# Patient Record
Sex: MALE | Race: WHITE | NOT HISPANIC OR LATINO | URBAN - METROPOLITAN AREA
[De-identification: names, ages, dates, MRNs, and addresses within clinical notes are randomized per-mention and may not be internally consistent; named-entity substitution may affect disease eponyms.]

---

## 2018-03-28 ENCOUNTER — INPATIENT (INPATIENT)
Facility: HOSPITAL | Age: 73
LOS: 5 days | Discharge: HOME | DRG: 40 | End: 2018-04-03
Attending: PSYCHIATRY & NEUROLOGY | Admitting: PSYCHIATRY & NEUROLOGY
Payer: MEDICARE

## 2018-03-28 VITALS
SYSTOLIC BLOOD PRESSURE: 212 MMHG | TEMPERATURE: 98 F | OXYGEN SATURATION: 100 % | RESPIRATION RATE: 18 BRPM | HEART RATE: 83 BPM | DIASTOLIC BLOOD PRESSURE: 96 MMHG

## 2018-03-28 DIAGNOSIS — R47.01 APHASIA: ICD-10-CM

## 2018-03-28 LAB
ALBUMIN SERPL ELPH-MCNC: 3.9 G/DL — SIGNIFICANT CHANGE UP (ref 3.3–5)
ALBUMIN SERPL ELPH-MCNC: 4 G/DL — SIGNIFICANT CHANGE UP (ref 3.3–5)
ALP SERPL-CCNC: 80 U/L — SIGNIFICANT CHANGE UP (ref 40–120)
ALP SERPL-CCNC: 86 U/L — SIGNIFICANT CHANGE UP (ref 40–120)
ALT FLD-CCNC: 19 U/L RC — SIGNIFICANT CHANGE UP (ref 10–45)
ALT FLD-CCNC: 20 U/L RC — SIGNIFICANT CHANGE UP (ref 10–45)
ANION GAP SERPL CALC-SCNC: 10 MMOL/L — SIGNIFICANT CHANGE UP (ref 5–17)
ANION GAP SERPL CALC-SCNC: 9 MMOL/L — SIGNIFICANT CHANGE UP (ref 5–17)
APTT BLD: 33.4 SEC — SIGNIFICANT CHANGE UP (ref 27.5–37.4)
APTT BLD: 54.9 SEC — HIGH (ref 27.5–37.4)
AST SERPL-CCNC: 21 U/L — SIGNIFICANT CHANGE UP (ref 10–40)
AST SERPL-CCNC: 24 U/L — SIGNIFICANT CHANGE UP (ref 10–40)
BASE EXCESS BLDV CALC-SCNC: 0.7 MMOL/L — SIGNIFICANT CHANGE UP (ref -2–2)
BASOPHILS # BLD AUTO: 0 K/UL — SIGNIFICANT CHANGE UP (ref 0–0.2)
BASOPHILS # BLD AUTO: 0.1 K/UL — SIGNIFICANT CHANGE UP (ref 0–0.2)
BASOPHILS NFR BLD AUTO: 0.3 % — SIGNIFICANT CHANGE UP (ref 0–2)
BASOPHILS NFR BLD AUTO: 1.2 % — SIGNIFICANT CHANGE UP (ref 0–2)
BILIRUB SERPL-MCNC: 0.4 MG/DL — SIGNIFICANT CHANGE UP (ref 0.2–1.2)
BILIRUB SERPL-MCNC: 0.5 MG/DL — SIGNIFICANT CHANGE UP (ref 0.2–1.2)
BLD GP AB SCN SERPL QL: NEGATIVE — SIGNIFICANT CHANGE UP
BUN SERPL-MCNC: 18 MG/DL — SIGNIFICANT CHANGE UP (ref 7–23)
BUN SERPL-MCNC: 21 MG/DL — SIGNIFICANT CHANGE UP (ref 7–23)
CA-I SERPL-SCNC: 1.35 MMOL/L — HIGH (ref 1.12–1.3)
CALCIUM SERPL-MCNC: 10.4 MG/DL — SIGNIFICANT CHANGE UP (ref 8.4–10.5)
CALCIUM SERPL-MCNC: 10.8 MG/DL — HIGH (ref 8.4–10.5)
CHLORIDE BLDV-SCNC: 110 MMOL/L — HIGH (ref 96–108)
CHLORIDE SERPL-SCNC: 106 MMOL/L — SIGNIFICANT CHANGE UP (ref 96–108)
CHLORIDE SERPL-SCNC: 107 MMOL/L — SIGNIFICANT CHANGE UP (ref 96–108)
CK MB BLD-MCNC: 3.2 % — SIGNIFICANT CHANGE UP (ref 0–3.5)
CK MB BLD-MCNC: 3.6 % — HIGH (ref 0–3.5)
CK MB CFR SERPL CALC: 4.7 NG/ML — SIGNIFICANT CHANGE UP (ref 0–6.7)
CK MB CFR SERPL CALC: 5.3 NG/ML — SIGNIFICANT CHANGE UP (ref 0–6.7)
CK SERPL-CCNC: 132 U/L — SIGNIFICANT CHANGE UP (ref 30–200)
CK SERPL-CCNC: 164 U/L — SIGNIFICANT CHANGE UP (ref 30–200)
CO2 BLDV-SCNC: 27 MMOL/L — SIGNIFICANT CHANGE UP (ref 22–30)
CO2 SERPL-SCNC: 22 MMOL/L — SIGNIFICANT CHANGE UP (ref 22–31)
CO2 SERPL-SCNC: 24 MMOL/L — SIGNIFICANT CHANGE UP (ref 22–31)
CREAT SERPL-MCNC: 0.7 MG/DL — SIGNIFICANT CHANGE UP (ref 0.5–1.3)
CREAT SERPL-MCNC: 0.82 MG/DL — SIGNIFICANT CHANGE UP (ref 0.5–1.3)
EOSINOPHIL # BLD AUTO: 0.1 K/UL — SIGNIFICANT CHANGE UP (ref 0–0.5)
EOSINOPHIL # BLD AUTO: 0.1 K/UL — SIGNIFICANT CHANGE UP (ref 0–0.5)
EOSINOPHIL NFR BLD AUTO: 0.9 % — SIGNIFICANT CHANGE UP (ref 0–6)
EOSINOPHIL NFR BLD AUTO: 1.4 % — SIGNIFICANT CHANGE UP (ref 0–6)
ESTIMATED AVERAGE GLUCOSE: 100 MG/DL — SIGNIFICANT CHANGE UP (ref 68–114)
GAS PNL BLDV: 137 MMOL/L — SIGNIFICANT CHANGE UP (ref 136–145)
GAS PNL BLDV: SIGNIFICANT CHANGE UP
GLUCOSE BLDC GLUCOMTR-MCNC: 104 MG/DL — HIGH (ref 70–99)
GLUCOSE BLDV-MCNC: 113 MG/DL — HIGH (ref 70–99)
GLUCOSE SERPL-MCNC: 116 MG/DL — HIGH (ref 70–99)
GLUCOSE SERPL-MCNC: 120 MG/DL — HIGH (ref 70–99)
HBA1C BLD-MCNC: 5.1 % — SIGNIFICANT CHANGE UP (ref 4–5.6)
HBA1C BLD-MCNC: 5.1 % — SIGNIFICANT CHANGE UP (ref 4–5.6)
HCO3 BLDV-SCNC: 25 MMOL/L — SIGNIFICANT CHANGE UP (ref 21–29)
HCT VFR BLD CALC: 47.5 % — SIGNIFICANT CHANGE UP (ref 39–50)
HCT VFR BLD CALC: 48.8 % — SIGNIFICANT CHANGE UP (ref 39–50)
HCT VFR BLDA CALC: 50 % — SIGNIFICANT CHANGE UP (ref 39–50)
HGB BLD CALC-MCNC: 16.2 G/DL — SIGNIFICANT CHANGE UP (ref 13–17)
HGB BLD-MCNC: 16.2 G/DL — SIGNIFICANT CHANGE UP (ref 13–17)
HGB BLD-MCNC: 16.6 G/DL — SIGNIFICANT CHANGE UP (ref 13–17)
INR BLD: 1 RATIO — SIGNIFICANT CHANGE UP (ref 0.88–1.16)
INR BLD: 1.06 RATIO — SIGNIFICANT CHANGE UP (ref 0.88–1.16)
LACTATE BLDV-MCNC: 1.6 MMOL/L — SIGNIFICANT CHANGE UP (ref 0.7–2)
LYMPHOCYTES # BLD AUTO: 1.6 K/UL — SIGNIFICANT CHANGE UP (ref 1–3.3)
LYMPHOCYTES # BLD AUTO: 18.6 % — SIGNIFICANT CHANGE UP (ref 13–44)
LYMPHOCYTES # BLD AUTO: 2.5 K/UL — SIGNIFICANT CHANGE UP (ref 1–3.3)
LYMPHOCYTES # BLD AUTO: 32.1 % — SIGNIFICANT CHANGE UP (ref 13–44)
MCHC RBC-ENTMCNC: 30.8 PG — SIGNIFICANT CHANGE UP (ref 27–34)
MCHC RBC-ENTMCNC: 31 PG — SIGNIFICANT CHANGE UP (ref 27–34)
MCHC RBC-ENTMCNC: 34 GM/DL — SIGNIFICANT CHANGE UP (ref 32–36)
MCHC RBC-ENTMCNC: 34 GM/DL — SIGNIFICANT CHANGE UP (ref 32–36)
MCV RBC AUTO: 90.7 FL — SIGNIFICANT CHANGE UP (ref 80–100)
MCV RBC AUTO: 91.2 FL — SIGNIFICANT CHANGE UP (ref 80–100)
MONOCYTES # BLD AUTO: 0.5 K/UL — SIGNIFICANT CHANGE UP (ref 0–0.9)
MONOCYTES # BLD AUTO: 0.8 K/UL — SIGNIFICANT CHANGE UP (ref 0–0.9)
MONOCYTES NFR BLD AUTO: 10 % — SIGNIFICANT CHANGE UP (ref 2–14)
MONOCYTES NFR BLD AUTO: 5.6 % — SIGNIFICANT CHANGE UP (ref 2–14)
NEUTROPHILS # BLD AUTO: 4.3 K/UL — SIGNIFICANT CHANGE UP (ref 1.8–7.4)
NEUTROPHILS # BLD AUTO: 6.4 K/UL — SIGNIFICANT CHANGE UP (ref 1.8–7.4)
NEUTROPHILS NFR BLD AUTO: 55.3 % — SIGNIFICANT CHANGE UP (ref 43–77)
NEUTROPHILS NFR BLD AUTO: 74.6 % — SIGNIFICANT CHANGE UP (ref 43–77)
PCO2 BLDV: 43 MMHG — SIGNIFICANT CHANGE UP (ref 35–50)
PH BLDV: 7.39 — SIGNIFICANT CHANGE UP (ref 7.35–7.45)
PLATELET # BLD AUTO: 237 K/UL — SIGNIFICANT CHANGE UP (ref 150–400)
PLATELET # BLD AUTO: 252 K/UL — SIGNIFICANT CHANGE UP (ref 150–400)
PO2 BLDV: 30 MMHG — SIGNIFICANT CHANGE UP (ref 25–45)
POTASSIUM BLDV-SCNC: 3.7 MMOL/L — SIGNIFICANT CHANGE UP (ref 3.5–5)
POTASSIUM SERPL-MCNC: 4 MMOL/L — SIGNIFICANT CHANGE UP (ref 3.5–5.3)
POTASSIUM SERPL-MCNC: 4.2 MMOL/L — SIGNIFICANT CHANGE UP (ref 3.5–5.3)
POTASSIUM SERPL-SCNC: 4 MMOL/L — SIGNIFICANT CHANGE UP (ref 3.5–5.3)
POTASSIUM SERPL-SCNC: 4.2 MMOL/L — SIGNIFICANT CHANGE UP (ref 3.5–5.3)
PROT SERPL-MCNC: 6.8 G/DL — SIGNIFICANT CHANGE UP (ref 6–8.3)
PROT SERPL-MCNC: 7.3 G/DL — SIGNIFICANT CHANGE UP (ref 6–8.3)
PROTHROM AB SERPL-ACNC: 10.8 SEC — SIGNIFICANT CHANGE UP (ref 9.8–12.7)
PROTHROM AB SERPL-ACNC: 11.6 SEC — SIGNIFICANT CHANGE UP (ref 9.8–12.7)
RBC # BLD: 5.24 M/UL — SIGNIFICANT CHANGE UP (ref 4.2–5.8)
RBC # BLD: 5.36 M/UL — SIGNIFICANT CHANGE UP (ref 4.2–5.8)
RBC # FLD: 12.7 % — SIGNIFICANT CHANGE UP (ref 10.3–14.5)
RBC # FLD: 12.9 % — SIGNIFICANT CHANGE UP (ref 10.3–14.5)
RH IG SCN BLD-IMP: POSITIVE — SIGNIFICANT CHANGE UP
RH IG SCN BLD-IMP: POSITIVE — SIGNIFICANT CHANGE UP
SAO2 % BLDV: 55 % — LOW (ref 67–88)
SODIUM SERPL-SCNC: 139 MMOL/L — SIGNIFICANT CHANGE UP (ref 135–145)
SODIUM SERPL-SCNC: 139 MMOL/L — SIGNIFICANT CHANGE UP (ref 135–145)
TROPONIN T SERPL-MCNC: <0.01 NG/ML — SIGNIFICANT CHANGE UP (ref 0–0.06)
TROPONIN T SERPL-MCNC: <0.01 NG/ML — SIGNIFICANT CHANGE UP (ref 0–0.06)
WBC # BLD: 7.7 K/UL — SIGNIFICANT CHANGE UP (ref 3.8–10.5)
WBC # BLD: 8.6 K/UL — SIGNIFICANT CHANGE UP (ref 3.8–10.5)
WBC # FLD AUTO: 7.7 K/UL — SIGNIFICANT CHANGE UP (ref 3.8–10.5)
WBC # FLD AUTO: 8.6 K/UL — SIGNIFICANT CHANGE UP (ref 3.8–10.5)

## 2018-03-28 PROCEDURE — 70496 CT ANGIOGRAPHY HEAD: CPT | Mod: 26

## 2018-03-28 PROCEDURE — 99223 1ST HOSP IP/OBS HIGH 75: CPT

## 2018-03-28 PROCEDURE — 99291 CRITICAL CARE FIRST HOUR: CPT

## 2018-03-28 PROCEDURE — 70551 MRI BRAIN STEM W/O DYE: CPT | Mod: 26

## 2018-03-28 PROCEDURE — 70498 CT ANGIOGRAPHY NECK: CPT | Mod: 26

## 2018-03-28 PROCEDURE — 71045 X-RAY EXAM CHEST 1 VIEW: CPT | Mod: 26

## 2018-03-28 PROCEDURE — 70450 CT HEAD/BRAIN W/O DYE: CPT | Mod: 26,59

## 2018-03-28 PROCEDURE — 93010 ELECTROCARDIOGRAM REPORT: CPT

## 2018-03-28 RX ORDER — ASPIRIN/CALCIUM CARB/MAGNESIUM 324 MG
81 TABLET ORAL DAILY
Qty: 0 | Refills: 0 | Status: DISCONTINUED | OUTPATIENT
Start: 2018-03-28 | End: 2018-04-03

## 2018-03-28 RX ORDER — ATORVASTATIN CALCIUM 80 MG/1
80 TABLET, FILM COATED ORAL AT BEDTIME
Qty: 0 | Refills: 0 | Status: DISCONTINUED | OUTPATIENT
Start: 2018-03-28 | End: 2018-04-03

## 2018-03-28 RX ORDER — ENOXAPARIN SODIUM 100 MG/ML
40 INJECTION SUBCUTANEOUS EVERY 24 HOURS
Qty: 0 | Refills: 0 | Status: DISCONTINUED | OUTPATIENT
Start: 2018-03-28 | End: 2018-04-03

## 2018-03-28 RX ORDER — CLOPIDOGREL BISULFATE 75 MG/1
75 TABLET, FILM COATED ORAL DAILY
Qty: 0 | Refills: 0 | Status: DISCONTINUED | OUTPATIENT
Start: 2018-03-28 | End: 2018-04-03

## 2018-03-28 RX ORDER — INFLUENZA VIRUS VACCINE 15; 15; 15; 15 UG/.5ML; UG/.5ML; UG/.5ML; UG/.5ML
0.5 SUSPENSION INTRAMUSCULAR ONCE
Qty: 0 | Refills: 0 | Status: DISCONTINUED | OUTPATIENT
Start: 2018-03-28 | End: 2018-04-03

## 2018-03-28 RX ADMIN — CLOPIDOGREL BISULFATE 75 MILLIGRAM(S): 75 TABLET, FILM COATED ORAL at 16:49

## 2018-03-28 RX ADMIN — ENOXAPARIN SODIUM 40 MILLIGRAM(S): 100 INJECTION SUBCUTANEOUS at 16:49

## 2018-03-28 RX ADMIN — ATORVASTATIN CALCIUM 80 MILLIGRAM(S): 80 TABLET, FILM COATED ORAL at 22:34

## 2018-03-28 RX ADMIN — Medication 81 MILLIGRAM(S): at 16:49

## 2018-03-28 NOTE — ED PROVIDER NOTE - OBJECTIVE STATEMENT
68yo M with h/o anxiety, traumatic brain injury, presenting to ED with AMS, difficulty speaking; last known normal was prior to sleep (b/t 12am-1am).  Per EMS, notification for possible stroke.

## 2018-03-28 NOTE — ED PROVIDER NOTE - PROGRESS NOTE DETAILS
Per d/w neurology: obtained CT head, perfusion and CTA.  Per time frame as best elicited in history (wife now at bedside), patient is not a candidate for TPA.  Possible endovascular intervention, but to be further evaluated by neurology service.  Neuro service at bedside, recommends treating as acute CVA for now.  /90; per stroke guidelines for patient's not getting TPA and per neurology service recommendations, will hold antihypertensive medication at present unless SBP> 220.  To be admitted to neuro service for further work-up and management. Lux

## 2018-03-28 NOTE — ED PROVIDER NOTE - UNABLE TO OBTAIN
Code stroke activation 027pa Urgent need for Intervention Severe Illness/Injury stroke code; ams; ros largely per EMS

## 2018-03-28 NOTE — H&P ADULT - ASSESSMENT
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 As per patient, he went to bed at 1 am and woke up with symptoms of weakness in right hand, right hand was moving on its own, word finding difficulty and difficulty understanding. Wife called EMS. His NIHSS was 2 (could not answer month, not able to repeat sentence clearly). MRS was 0. He denied numbness, dizziness, change in vision. He had problem with repetition and following commands.     Impression     Acute stroke symptoms from unclear etiology     Plan     Permissive HTN x 24hrs goal /120  MRI brain w/o cont  HgA1c  Lipid profile  ASA 81mg  Atorvastatin 80 mg   TTE  Telemetry monitoring  PT/OT   speech eval   swallow eval

## 2018-03-28 NOTE — ED ADULT NURSE REASSESSMENT NOTE - NS ED NURSE REASSESS COMMENT FT1
s/w Dr Villa regarding patients duplicate order for labs- he states no need to re draw labs that are already resulted. he states they were entered in error and will be cancelled.

## 2018-03-28 NOTE — ED PROVIDER NOTE - ATTENDING CONTRIBUTION TO CARE
I have examined and evaluated this patient with the above resident or PA, and agree with the documented clinical history, exam and plan.   Briefly: Pt preesenting with acute AMS, possible stroke.  Code stroke, neuro at bedside, to be admitted, see hpi and progress note above.

## 2018-03-28 NOTE — ED ADULT NURSE NOTE - OBJECTIVE STATEMENT
67M comes to ED via EMS for change in mental status and difficulty speaking. EMS states patient went to bed at about 1AM and woke at 6AM. Patients wife called EMS. Code stroke called upon patient arrival and patient taken immediately to CT scan. Patient is alert and orientedx3 but extremely aphasic and dysarthric. RN and MD at patients bedside during CT scan. No TPA as per neuro because of unknown time of onset. Patient has PMH TBI where a commercial grade garage door fell on his head approx 1 year ago. Patients wife states he has had "tremors" to his right arm and leg causing him to trip at times. Patient denies chest pain/SOB/dizziness/blurry vision/abdominal pain/fever/chills/numbness/weakness. Patient will remain on cardiac monitor with bedrails up for safety. Will continue to monitor.

## 2018-03-28 NOTE — H&P ADULT - HISTORY OF PRESENT ILLNESS
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 As per patient, he went to bed at 1 am and woke up with symptoms of weakness in right hand, right hand was moving on its own, word finding difficulty and difficulty understanding. Wife called EMS. On initial encounter, NIHSS was 6 but later after imaging his NIHSS was 2 (could not answer month, not able to repeat sentence clearly). MRS was 0. He denied numbness, dizziness, change in vision. He had problem with repetition and following commands.

## 2018-03-28 NOTE — ED ADULT NURSE NOTE - CHPI ED SYMPTOMS NEG
no numbness/no change in level of consciousness/no blurred vision/no dizziness/no confusion/no loss of consciousness/no nausea/no fever/no vomiting/no weakness

## 2018-03-28 NOTE — H&P ADULT - ATTENDING COMMENTS
The patient was seen and examined by me. Imaging (CT head, CTA neck and head) was reviewed by me. This is a 67M with HLD, past TBI, who presents with new aphasia and right hemiparesis. Last seen well at 11 pm last night, so not an IV tPA candidate and no large vessel occlusion with low NIHSS, so not an endovascular candidate. CT head is normal, and CTA shows not intracranial stenosis or occlusion, and a left carotid bifurcation atherosclerotic plaque with mild stenosis. On exam, he is awake and alert, oriented to choices, but significant expressive aphasia, alexia, agraphia, naming impaired, but recognizes objects, follows 2 step commands, oromotor apraxia, EOMI, VFF, face symmetric, no drift, 5/5 power x 4 extremities. normal sensation to LT, normal coordination. Impression: Cerebral embolism with infarction, likely atheroembolic from left carotid stenosis. The stenosis is too mild to require CEA or GERALD. Plan is medical therapy with ASA, Plavix, Lipitor 80, BP monitoring and telemetry. Outpatient TTE is ok. PT/OT and speech eval. May be able to go home with outpatient speech therapy. Non smoker. Check lipids, HbA1c. Lovenox VTE prophylaxis. Passed dysphagia screen, so puree diet and advance to regular as tolerated. Monitor neurologically in the stroke unit.

## 2018-03-28 NOTE — H&P ADULT - NSHPLABSRESULTS_GEN_ALL_CORE
16.6   7.7   )-----------( 252      ( 28 Mar 2018 07:07 )             48.8   03-28    139  |  106  |  21  ----------------------------<  120<H>  4.0   |  24  |  0.82    Ca    10.8<H>      28 Mar 2018 07:07    TPro  7.3  /  Alb  4.0  /  TBili  0.4  /  DBili  x   /  AST  24  /  ALT  20  /  AlkPhos  86  03-28    PT/INR - ( 28 Mar 2018 07:07 )   PT: 10.8 sec;   INR: 1.00 ratio         PTT - ( 28 Mar 2018 07:07 )  PTT:33.4 sec    < from: CT Brain Stroke Protocol (03.28.18 @ 07:29) >      IMPRESSION:   No CT evidence of acute intracranial hemorrhage, mass or infarct.     < end of copied text >

## 2018-03-29 LAB
CHOLEST SERPL-MCNC: 191 MG/DL — SIGNIFICANT CHANGE UP (ref 10–199)
HDLC SERPL-MCNC: 44 MG/DL — SIGNIFICANT CHANGE UP (ref 40–125)
LIPID PNL WITH DIRECT LDL SERPL: 127 MG/DL — SIGNIFICANT CHANGE UP
TOTAL CHOLESTEROL/HDL RATIO MEASUREMENT: 4.3 RATIO — SIGNIFICANT CHANGE UP (ref 3.4–9.6)
TRIGL SERPL-MCNC: 102 MG/DL — SIGNIFICANT CHANGE UP (ref 10–149)

## 2018-03-29 RX ORDER — IBUPROFEN 200 MG
400 TABLET ORAL ONCE
Qty: 0 | Refills: 0 | Status: COMPLETED | OUTPATIENT
Start: 2018-03-29 | End: 2018-03-29

## 2018-03-29 RX ADMIN — Medication 81 MILLIGRAM(S): at 11:08

## 2018-03-29 RX ADMIN — CLOPIDOGREL BISULFATE 75 MILLIGRAM(S): 75 TABLET, FILM COATED ORAL at 11:08

## 2018-03-29 RX ADMIN — ATORVASTATIN CALCIUM 80 MILLIGRAM(S): 80 TABLET, FILM COATED ORAL at 21:17

## 2018-03-29 RX ADMIN — Medication 400 MILLIGRAM(S): at 20:10

## 2018-03-29 RX ADMIN — ENOXAPARIN SODIUM 40 MILLIGRAM(S): 100 INJECTION SUBCUTANEOUS at 17:10

## 2018-03-29 NOTE — OCCUPATIONAL THERAPY INITIAL EVALUATION ADULT - PLANNED THERAPY INTERVENTIONS, OT EVAL
ADL retraining/bed mobility training/strengthening/transfer training/fine motor coordination training/balance training/cognitive, visual perceptual

## 2018-03-29 NOTE — PHYSICAL THERAPY INITIAL EVALUATION ADULT - PLANNED THERAPY INTERVENTIONS, PT EVAL
gait training/strengthening/transfer training/balance training/bed mobility training/stair training. GOAL: (to be met in 4 wks) negotiate 5 steps without rail and appropriate assistive device with step to step pattern

## 2018-03-29 NOTE — OCCUPATIONAL THERAPY INITIAL EVALUATION ADULT - ADDITIONAL COMMENTS
MRS was 0.Xray Chest (-). CT angiography neck: No evidence of hemodynamically significant stenosis using NASCET criteria.  Patent vertebral arteries.  No evidence of vascular dissection. CT angiography brain: No major vessel occlusion or hemodynamically significant proximal stenosis.  CT perfusion: Areas of perfusion mismatch in the left frontal and parietal lobes compatible with early ischemia. CTH (-)

## 2018-03-29 NOTE — PHYSICAL THERAPY INITIAL EVALUATION ADULT - PRECAUTIONS/LIMITATIONS, REHAB EVAL
MRI 3/28: Scattered regions of acute infarction within the L MCA territory as evidenced by foci of restricted diffusion with hyperintensity on the DWI/fall precautions

## 2018-03-29 NOTE — PROGRESS NOTE ADULT - SUBJECTIVE AND OBJECTIVE BOX
THE PATIENT WAS SEEN AND EXAMINED BY ME WITH THE HOUSESTAFF AND STROKE TEAM DURING MORNING ROUNDS.   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 As per patient, he went to bed at 12 am morning of admission and woke up with symptoms of weakness in right hand, right hand was moving on its own, word finding difficulty and difficulty understanding. Wife called EMS. On initial encounter, NIHSS was 6 but later after imaging his NIHSS was 2 (could not answer month, not able to repeat sentence clearly). MRS was 0.        SUBJECTIVE: No events overnight.  No new neurologic complaints.      aspirin enteric coated 81 milliGRAM(s) Oral daily  atorvastatin 80 milliGRAM(s) Oral at bedtime  clopidogrel Tablet 75 milliGRAM(s) Oral daily  enoxaparin Injectable 40 milliGRAM(s) SubCutaneous every 24 hours  influenza   Vaccine 0.5 milliLiter(s) IntraMuscular once      PHYSICAL EXAM:   Vital Signs Last 24 Hrs  T(C): 36.9 (29 Mar 2018 07:21), Max: 36.9 (29 Mar 2018 07:21)  T(F): 98.5 (29 Mar 2018 07:21), Max: 98.5 (29 Mar 2018 07:21)  HR: 65 (29 Mar 2018 07:21) (62 - 74)  BP: 159/75 (29 Mar 2018 07:21) (152/80 - 176/77)  BP(mean): --  RR: 20 (29 Mar 2018 07:21) (17 - 20)  SpO2: 98% (29 Mar 2018 07:21) (97% - 99%)    General: No acute distress  HEENT: EOM intact, visual fields full  Abdomen: Soft, nontender, nondistended   Extremities: No edema    NEUROLOGICAL EXAM:  Mental status: he is awake and alert, oriented to choices, word finding difficulty, paraphasic erros, alexia, agraphia, naming impaired,  recognizes objects, follows 2 step commands, oromotor apraxia,   Cranial Nerves: No facial asymmetry, no nystagmus, no dysarthria,  tongue midline  Motor exam: Normal tone, no drift, 5/5 RUE, 5/5 RLE, 5/5 LUE, 5/5 LLE, normal fine finger movements.  Sensation: Intact to light touch   Coordination/ Gait: No dysmetria, TIRSO intact and symmetric bilaterally    LABS:                        16.2   8.6   )-----------( 237      ( 28 Mar 2018 10:06 )             47.5    03-28    139  |  107  |  18  ----------------------------<  116<H>  4.2   |  22  |  0.70    Ca    10.4      28 Mar 2018 10:06    TPro  6.8  /  Alb  3.9  /  TBili  0.5  /  DBili  x   /  AST  21  /  ALT  19  /  AlkPhos  80  03-28  PT/INR - ( 28 Mar 2018 10:06 )   PT: 11.6 sec;   INR: 1.06 ratio         PTT - ( 28 Mar 2018 10:06 )  PTT:54.9 sec  Hemoglobin A1C, Whole Blood: 5.1 % (03-28 @ 15:29)  Hemoglobin A1C, Whole Blood: 5.1 % (03-28 @ 15:29)      IMAGING: Reviewed by me.     CT Angio Neck, CTA brain, CT perfusion w/ IV Cont (03.28.18)   CT angiography neck: No evidence of hemodynamically significant stenosis   using NASCET criteria.  Patent vertebral arteries.  No evidence of   vascular dissection.    CT angiography brain: No major vessel occlusion or hemodynamically   significant proximal stenosis.      CT perfusion: Areas of perfusion mismatch in the left frontal and   parietal lobes compatible with early ischemia.      MR Head No Cont (03.28.18)    Scattered regions of acute infarction within the left MCA   territory as evidenced by foci of restricted diffusion with   hyperintensity on the DWI. No associated hemorrhage or mass effect at   this time. No other regions of infarct..

## 2018-03-29 NOTE — OCCUPATIONAL THERAPY INITIAL EVALUATION ADULT - DIAGNOSIS, OT EVAL
Pt demonstrated expressive aphasia, alexia, apraxia and impaired cognition impacting ability to perform ADLs/IADLs and all functional activities

## 2018-03-29 NOTE — SPEECH LANGUAGE PATHOLOGY EVALUATION - COMMENTS
Hx cont: CT head is normal, and CTA shows not intracranial stenosis or occlusion, and a left carotid bifurcation atherosclerotic plaque with mild stenosis. On exam, he is awake and alert, oriented to choices, but significant expressive aphasia, alexia, agraphia, naming impaired, but recognizes objects, follows 2 step commands, oromotor apraxia, EOMI, VFF, face symmetric, no drift, 5/5 power x 4 extremities. normal sensation to LT, normal coordination. Impression: Cerebral embolism with infarction, left MCA, likely atheroembolic from left carotid stenosis. The stenosis is too mild to require CEA or GERALD. Plan is medical therapy with ASA, Plavix, Lipitor 80, BP monitoring and telemetry. Outpatient TTE is ok.  May be able to go home with outpatient speech therapy. Passed dysphagia screen. 3/29 Neuro: Would screen for competing cardioembolic etiologies such as PAF with outpatient long term telemetry monitoring.  IMAGING:   3/28 CXR: clear lungs    CT angiography neck: No evidence of hemodynamically significant stenosis   using NASCET criteria.  Patent vertebral arteries.  No evidence of   vascular dissection.    CT angiography brain: No major vessel occlusion or hemodynamically   significant proximal stenosis.      CT perfusion: Areas of perfusion mismatch in the left frontal and   parietal lobes compatible with early ischemia.    MRI Head: : Scattered regions of acute infarction within the left MCA   territory as evidenced by foci of restricted diffusion with   hyperintensity on the DWI. No associated hemorrhage or mass effect at   this time. No other regions of infarct.. Yes/no questions re: simple paragraph: 3/4xs  Yes/no questions re: semi-complex paragraph: 4/4xs Picture description task: + empty speech, word finding deficits, impaired syntax, signs of apraxia of speech This service will follow inhouse for restorative language tx as schedule permits. Full cognitive-linguistic assessment confounded by language deficits Impaired for first name and confrontational naming->+ omissions, impaired spelling, impaired organization. Pt did not benefit from cueing. WFL This service will follow inhouse for restorative language tx as schedule permits. Pt would benefit from restorative language tx post discharge. Pt, family and neuro MD made aware. Picture description task: + empty speech, word finding deficits, impaired syntax, signs suggestive of apraxia of speech

## 2018-03-29 NOTE — OCCUPATIONAL THERAPY INITIAL EVALUATION ADULT - STRENGTHENING, PT EVAL
GOAL: Pt will improve bilateral UE/LE strength to 5/5 to increase independence in ADLs within 4 weeks

## 2018-03-29 NOTE — SPEECH LANGUAGE PATHOLOGY EVALUATION - SLP DIAGNOSIS
Pt presents with a nonfluent aphasia characterized by receptive and expressive language deficits. Receptive language breaks down at the semi-complex level. Expressive language characterized by word finding deficits, phonemic paraphasias, empty speech, circumlocution, and perseveration of speech. Pt also evidences signs of apraxia of speech. Pt benefits from carrier phrase, field of two choices, phonemic cues, increased response time. Pt presents with a nonfluent aphasia characterized by receptive and expressive language deficits. Receptive language breaks down at the semi-complex level. Expressive language characterized by word finding deficits, phonemic paraphasias, empty speech, circumlocution, and perseveration of speech. Pt also evidences signs suggestive of apraxia of speech. Pt benefits from carrier phrase, field of two choices, phonemic cues, increased response time. Pt presents with a nonfluent aphasia characterized by receptive and expressive language deficits. Receptive language breaks down at the semi-complex level. Expressive language characterized by word finding deficits, phonemic paraphasias, empty speech, circumlocution, and perseveration of speech. Pt also evidences signs suggestive of apraxia of speech. Pt benefits from carrier phrase, field of two choices, phonemic cues, increased response time. Pt with fast rate of speech.

## 2018-03-29 NOTE — PROGRESS NOTE ADULT - ASSESSMENT
ASSESSMENT: 67M with HLD, past TBI, who presented with new aphasia and right hemiparesis. Last seen well at 11 pm last night noted went to bed around midnight, so not an IV tPA candidate and no large vessel occlusion with low NIHSS, so not an endovascular candidate. CT head is normal, and CTA shows no intracranial stenosis or occlusion, and a left carotid bifurcation atherosclerotic plaque with mild stenosis.  Impression: Cerebral embolism with infarction, left MCA, likely atheroembolic from left carotid stenosis. The stenosis is too mild to require CEA or GERALD.      NEURO: Continue close monitoring for neurologic deterioration, permissive HTN with gradual titration to normotension in setting of carotid stenosis,, titrate statin to LDL goal less than 70, MR imaging as noted above,  Physical therapy/OT/Speech eval pending.    ANTITHROMBOTIC THERAPY: ASA/Plavix    PULMONARY: CXR clear, protecting airway, saturating well     CARDIOVASCULAR: check TTE, cardiac monitoring                              SBP goal: gradual titration to normotension    GASTROINTESTINAL:  dysphagia screen passed, on puree diet, advance as tolerated      Diet: puree     RENAL: BUN/Cr without acute change, maintain adequate hydration, good urine output      Na Goal: Greater than 135     Jones:n     HEMATOLOGY: H/H without acute change, no active bleeding, Platelets 237     DVT ppx: Heparin s.c [] LMWH [x]     ID: afebrile, no leukocytosis     OTHER: current medical condition and plan of care d/w patient and wife at bedside, all questions answered and concerns addressed.    DISPOSITION: Rehab or home depending on PT eval once stable and workup is complete      CORE MEASURES:        Admission NIHSS: 6     TPA: [] YES [x] NO      LDL/HDL:127/44     Depression Screen: p     Statin Therapy: y      Dysphagia Screen: [x] PASS [] FAIL     Smoking [] YES [x] NO      Afib [] YES [x] NO     Stroke Education [] YES [] NO -pending ASSESSMENT: 67M with HLD, past TBI, who presented with new aphasia and right hemiparesis. Last seen well at 11 pm last night noted went to bed around midnight, so not an IV tPA candidate and no large vessel occlusion with low NIHSS, so not an endovascular candidate. CT head is normal, and CTA shows no intracranial stenosis or occlusion, and a left carotid bifurcation atherosclerotic plaque with mild stenosis.  Impression: Cerebral embolism with infarction, left MCA, likely atheroembolic from left carotid stenosis. The stenosis is too mild to require CEA or GERALD.  Would screen for competing cardioembolic etiologies such as PAF with outpatient long term telemetry monitoring.    NEURO: Continue close monitoring for neurologic deterioration, permissive HTN with gradual titration to normotension in setting of carotid stenosis,, titrate statin to LDL goal less than 70, MR imaging as noted above,  Physical therapy/OT/Speech eval pending.    ANTITHROMBOTIC THERAPY: ASA/Plavix    PULMONARY: CXR clear, protecting airway, saturating well     CARDIOVASCULAR: check TTE, cardiac monitoring                              SBP goal: gradual titration to normotension    GASTROINTESTINAL:  dysphagia screen passed, on puree diet, advance as tolerated      Diet: puree     RENAL: BUN/Cr without acute change, maintain adequate hydration, good urine output      Na Goal: Greater than 135     Jones:n     HEMATOLOGY: H/H without acute change, no active bleeding, Platelets 237     DVT ppx: Heparin s.c [] LMWH [x]     ID: afebrile, no leukocytosis     OTHER: current medical condition and plan of care d/w patient and wife at bedside, all questions answered and concerns addressed.    DISPOSITION: Rehab or home depending on PT eval once stable and workup is complete      CORE MEASURES:        Admission NIHSS: 6     TPA: [] YES [x] NO      LDL/HDL:127/44     Depression Screen: p     Statin Therapy: y      Dysphagia Screen: [x] PASS [] FAIL     Smoking [] YES [x] NO      Afib [] YES [x] NO     Stroke Education [] YES [] NO -pending ASSESSMENT: 67M with HLD, past TBI, who presented with new aphasia and right hemiparesis. Last seen well at 11 pm last night noted went to bed around midnight, so not an IV tPA candidate and no large vessel occlusion with low NIHSS, so not an endovascular candidate. CT head is normal, and CTA shows no intracranial stenosis or occlusion, and a left carotid bifurcation atherosclerotic plaque with mild stenosis.  Impression: Cerebral embolism with infarction, left MCA, likely atheroembolic from left carotid stenosis. The stenosis is too mild to require CEA or GERALD.  Would screen for competing cardioembolic etiologies such as PAF with outpatient long term telemetry monitoring.    NEURO: Continue close monitoring for neurologic deterioration, permissive HTN with gradual titration to normotension in setting of carotid stenosis,, titrate statin to LDL goal less than 70, MR imaging as noted above,  Physical therapy/OT/Speech eval pending.    ANTITHROMBOTIC THERAPY: ASA/Plavix    PULMONARY: CXR clear, protecting airway, saturating well     CARDIOVASCULAR: check TTE, cardiac monitoring, will discuss with him the [employer] AF study since he is eligible.                              SBP goal: gradual titration to normotension    GASTROINTESTINAL:  dysphagia screen passed, on puree diet, advance as tolerated      Diet: puree     RENAL: BUN/Cr without acute change, maintain adequate hydration, good urine output      Na Goal: Greater than 135     Jones:n     HEMATOLOGY: H/H without acute change, no active bleeding, Platelets 237     DVT ppx: Heparin s.c [] LMWH [x]     ID: afebrile, no leukocytosis     OTHER: current medical condition and plan of care d/w patient and wife at bedside, all questions answered and concerns addressed.    DISPOSITION: Rehab or home depending on PT eval once stable and workup is complete      CORE MEASURES:        Admission NIHSS: 6     TPA: [] YES [x] NO      LDL/HDL:127/44     Depression Screen: p     Statin Therapy: y      Dysphagia Screen: [x] PASS [] FAIL     Smoking [] YES [x] NO      Afib [] YES [x] NO     Stroke Education [] YES [] NO -pending

## 2018-03-29 NOTE — OCCUPATIONAL THERAPY INITIAL EVALUATION ADULT - NS ASR FOLLOW COMMAND OT EVAL
able to follow single-step instructions/difficult with multistep command follow, pt able to mimic 100% of OT tx session/aphasia/oral apraxia/75% of the time

## 2018-03-29 NOTE — SPEECH LANGUAGE PATHOLOGY EVALUATION - SLP ORIENTATION
Grossly A&Ox3 via verbalization (independently to name, to date with field of two choices, grossly to place "hospital")

## 2018-03-29 NOTE — OCCUPATIONAL THERAPY INITIAL EVALUATION ADULT - BALANCE TRAINING, PT EVAL
GOAL: Pt will demonstrate improved static/dynamic standing balance to good in order to increase safety and independence in ADLs within 4 weeks

## 2018-03-29 NOTE — PHYSICAL THERAPY INITIAL EVALUATION ADULT - ADDITIONAL COMMENTS
lives in apartment with wife, 3 steps down and then 2 steps up to enter building without rails, right hand dominant lives in apartment with wife, 3 steps down and then 2 steps up to enter building without rails, right hand dominant    +word finding issues, difficulty at times repeating a word stated by PT, slightly impulsive

## 2018-03-29 NOTE — OCCUPATIONAL THERAPY INITIAL EVALUATION ADULT - NS ASR APHASIA TYPE OT
expressive/apraxia/Expressive aphasia, pt able to name 0 of 3 simple objects,? Apraxia difficulty following motor commands however able to mimic, difficulty drawing a clock and adding numbers, Alexia- difficulty writing own name and clock drawing

## 2018-03-29 NOTE — SPEECH LANGUAGE PATHOLOGY EVALUATION - SLP PERTINENT HISTORY OF CURRENT PROBLEM
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 As per patient, he went to bed at 1 am and woke up with symptoms of weakness in right hand, right hand was moving on its own, word finding difficulty and difficulty understanding. Wife called EMS. On initial encounter, NIHSS was 6 but later after imaging his NIHSS was 2 (could not answer month, not able to repeat sentence clearly). MRS was 0. He denied numbness, dizziness, change in vision. He had problem with repetition and following commands. Impression: Acute stroke symptoms from unclear etiology.

## 2018-03-29 NOTE — PHYSICAL THERAPY INITIAL EVALUATION ADULT - PERTINENT HX OF CURRENT PROBLEM, REHAB EVAL
BIBEMS for aphasia. Pt went to bed at 1am & woke up with symptoms of weakness in R hand, R hand was moving on its own, word finding difficulty & difficulty understanding. Wife called EMS. initial NIHSS was 6 but later after imaging his NIHSS was 2. He had problem with repetition and following commands. CT perfusion 3/28: Areas of perfusion mismatch in the L frontal & parietal lobes compatible with early ischemia. cont...

## 2018-03-29 NOTE — OCCUPATIONAL THERAPY INITIAL EVALUATION ADULT - PERTINENT HX OF CURRENT PROBLEM, REHAB EVAL
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 As per patient, he went to bed at 1 am and woke up with symptoms of weakness in R hand, right hand was moving on its own, word finding difficulty and difficulty understanding. Wife called EMS. On initial encounter, NIHSS was 6 but later after imaging his NIHSS was 2 (could not answer month, not able to repeat sentence clearly).

## 2018-03-29 NOTE — SPEECH LANGUAGE PATHOLOGY EVALUATION - SLP AUTOMATIC SPEECH
counting/+ phonemic paraphasias, signs of apraxia of speech, phonemic paraphasias/months of the year/impaired/days of the week days of the week/counting/months of the year/+ phonemic paraphasias, signs suggestive of apraxia of speech, phonemic paraphasias/impaired

## 2018-03-29 NOTE — OCCUPATIONAL THERAPY INITIAL EVALUATION ADULT - LEVEL OF CONSCIOUSNESS, OT EVAL
alert/Pt able to identify body parts correctly in 3/3 trials, identify R/L in 3/3 trials, difficulty with color recognition and naming objects

## 2018-03-29 NOTE — OCCUPATIONAL THERAPY INITIAL EVALUATION ADULT - REHAB POTENTIAL, OT EVAL
SUBJECTIVE:   Latonia Broussard is a 22 month old female who presents to clinic today for the following health issues:    She is moving around normally now. Her temp is 99. She is taking food and liquids. She slept well last night. There has been strep in the .      Pt was at  they saidpt was not wanting to move around very tired and pulling at ears. Started today        Problem list and histories reviewed & adjusted, as indicated.  Additional history: as documented    Patient Active Problem List   Diagnosis     Single liveborn, born in hospital, delivered     Need for prophylactic fluoride administration     History reviewed. No pertinent surgical history.    Social History   Substance Use Topics     Smoking status: Never Smoker     Smokeless tobacco: Not on file     Alcohol use Not on file     History reviewed. No pertinent family history.          Reviewed and updated as needed this visit by clinical staffAllergies  Meds  Problems  Med Hx  Surg Hx  Fam Hx       Reviewed and updated as needed this visit by Provider  Meds  Problems         ROS:  CONSTITUTIONAL:fever   ENT/MOUTH: NEGATIVE for ear, mouth and throat problems  RESP:NEGATIVE for significant cough or SOB    OBJECTIVE:     Temp 99.3  F (37.4  C) (Tympanic)  Wt 29 lb 1.9 oz (13.2 kg)  There is no height or weight on file to calculate BMI.  GENERAL: healthy, alert and no distress  HENT: ear canals and TM's normal, nose and mouth without ulcers or lesions  NECK: no adenopathy, no asymmetry, masses, or scars and thyroid normal to palpation  RESP: lungs clear to auscultation - no rales, rhonchi or wheezes  CV: regular rate and rhythm, normal S1 S2, no S3 or S4, no murmur, click or rub, no peripheral edema and peripheral pulses strong  Rapid strep is negative      ASSESSMENT/PLAN:               ICD-10-CM    1. Fever, unspecified fever cause R50.9 Rapid strep screen     Beta strep group A culture     Recheck in clinic as needed.        Rhys  Rayshawn Brewer MD  [facility]   good, to achieve stated therapy goals

## 2018-03-30 ENCOUNTER — TRANSCRIPTION ENCOUNTER (OUTPATIENT)
Age: 73
End: 2018-03-30

## 2018-03-30 DIAGNOSIS — I63.512 CEREBRAL INFARCTION DUE TO UNSPECIFIED OCCLUSION OR STENOSIS OF LEFT MIDDLE CEREBRAL ARTERY: ICD-10-CM

## 2018-03-30 LAB
ANION GAP SERPL CALC-SCNC: 11 MMOL/L — SIGNIFICANT CHANGE UP (ref 5–17)
BUN SERPL-MCNC: 24 MG/DL — HIGH (ref 7–23)
CALCIUM SERPL-MCNC: 10.2 MG/DL — SIGNIFICANT CHANGE UP (ref 8.4–10.5)
CHLORIDE SERPL-SCNC: 109 MMOL/L — HIGH (ref 96–108)
CO2 SERPL-SCNC: 22 MMOL/L — SIGNIFICANT CHANGE UP (ref 22–31)
CREAT SERPL-MCNC: 0.83 MG/DL — SIGNIFICANT CHANGE UP (ref 0.5–1.3)
GLUCOSE BLDC GLUCOMTR-MCNC: 98 MG/DL — SIGNIFICANT CHANGE UP (ref 70–99)
GLUCOSE SERPL-MCNC: 98 MG/DL — SIGNIFICANT CHANGE UP (ref 70–99)
HCT VFR BLD CALC: 45 % — SIGNIFICANT CHANGE UP (ref 39–50)
HGB BLD-MCNC: 15.7 G/DL — SIGNIFICANT CHANGE UP (ref 13–17)
MCHC RBC-ENTMCNC: 30.9 PG — SIGNIFICANT CHANGE UP (ref 27–34)
MCHC RBC-ENTMCNC: 34.9 GM/DL — SIGNIFICANT CHANGE UP (ref 32–36)
MCV RBC AUTO: 88.6 FL — SIGNIFICANT CHANGE UP (ref 80–100)
NRBC # BLD: 0 /100 WBCS — SIGNIFICANT CHANGE UP (ref 0–0)
PLATELET # BLD AUTO: 234 K/UL — SIGNIFICANT CHANGE UP (ref 150–400)
POTASSIUM SERPL-MCNC: 3.8 MMOL/L — SIGNIFICANT CHANGE UP (ref 3.5–5.3)
POTASSIUM SERPL-SCNC: 3.8 MMOL/L — SIGNIFICANT CHANGE UP (ref 3.5–5.3)
RBC # BLD: 5.08 M/UL — SIGNIFICANT CHANGE UP (ref 4.2–5.8)
RBC # FLD: 14.2 % — SIGNIFICANT CHANGE UP (ref 10.3–14.5)
SODIUM SERPL-SCNC: 142 MMOL/L — SIGNIFICANT CHANGE UP (ref 135–145)
WBC # BLD: 8.36 K/UL — SIGNIFICANT CHANGE UP (ref 3.8–10.5)
WBC # FLD AUTO: 8.36 K/UL — SIGNIFICANT CHANGE UP (ref 3.8–10.5)

## 2018-03-30 PROCEDURE — 93306 TTE W/DOPPLER COMPLETE: CPT | Mod: 26

## 2018-03-30 PROCEDURE — 99233 SBSQ HOSP IP/OBS HIGH 50: CPT

## 2018-03-30 PROCEDURE — 33282: CPT

## 2018-03-30 RX ORDER — AMLODIPINE BESYLATE 2.5 MG/1
5 TABLET ORAL DAILY
Qty: 0 | Refills: 0 | Status: DISCONTINUED | OUTPATIENT
Start: 2018-03-30 | End: 2018-04-03

## 2018-03-30 RX ORDER — ASPIRIN/CALCIUM CARB/MAGNESIUM 324 MG
2 TABLET ORAL
Qty: 0 | Refills: 0 | COMMUNITY

## 2018-03-30 RX ORDER — CEFAZOLIN SODIUM 1 G
2000 VIAL (EA) INJECTION ONCE
Qty: 0 | Refills: 0 | Status: COMPLETED | OUTPATIENT
Start: 2018-03-30 | End: 2018-03-30

## 2018-03-30 RX ORDER — ATORVASTATIN CALCIUM 80 MG/1
1 TABLET, FILM COATED ORAL
Qty: 0 | Refills: 0 | DISCHARGE
Start: 2018-03-30

## 2018-03-30 RX ORDER — AMLODIPINE BESYLATE 2.5 MG/1
1 TABLET ORAL
Qty: 0 | Refills: 0 | DISCHARGE
Start: 2018-03-30

## 2018-03-30 RX ORDER — ATORVASTATIN CALCIUM 80 MG/1
1 TABLET, FILM COATED ORAL
Qty: 0 | Refills: 0 | COMMUNITY

## 2018-03-30 RX ORDER — NAPROXEN AND ESOMEPRAZOLE MAGNESIUM 375; 20 MG/1; MG/1
1 TABLET, DELAYED RELEASE ORAL
Qty: 0 | Refills: 0 | COMMUNITY

## 2018-03-30 RX ORDER — ASPIRIN/CALCIUM CARB/MAGNESIUM 324 MG
1 TABLET ORAL
Qty: 0 | Refills: 0 | DISCHARGE
Start: 2018-03-30

## 2018-03-30 RX ORDER — CLOPIDOGREL BISULFATE 75 MG/1
1 TABLET, FILM COATED ORAL
Qty: 0 | Refills: 0 | DISCHARGE
Start: 2018-03-30

## 2018-03-30 RX ADMIN — ENOXAPARIN SODIUM 40 MILLIGRAM(S): 100 INJECTION SUBCUTANEOUS at 18:02

## 2018-03-30 RX ADMIN — Medication 100 MILLIGRAM(S): at 18:01

## 2018-03-30 RX ADMIN — ATORVASTATIN CALCIUM 80 MILLIGRAM(S): 80 TABLET, FILM COATED ORAL at 21:20

## 2018-03-30 RX ADMIN — AMLODIPINE BESYLATE 5 MILLIGRAM(S): 2.5 TABLET ORAL at 18:02

## 2018-03-30 RX ADMIN — CLOPIDOGREL BISULFATE 75 MILLIGRAM(S): 75 TABLET, FILM COATED ORAL at 11:33

## 2018-03-30 RX ADMIN — Medication 81 MILLIGRAM(S): at 11:33

## 2018-03-30 RX ADMIN — Medication 400 MILLIGRAM(S): at 00:00

## 2018-03-30 NOTE — DISCHARGE NOTE ADULT - MEDICATION SUMMARY - MEDICATIONS TO STOP TAKING
I will STOP taking the medications listed below when I get home from the hospital:    Vimovo 500 mg-20 mg oral delayed release tablet  -- 1 tab(s) by mouth 2 times a day

## 2018-03-30 NOTE — PROGRESS NOTE ADULT - SUBJECTIVE AND OBJECTIVE BOX
THE PATIENT WAS SEEN AND EXAMINED BY ME WITH THE HOUSESTAFF AND STROKE TEAM DURING MORNING ROUNDS.   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 As per patient, he went to bed at 12 am morning of admission and woke up with symptoms of weakness in right hand, right hand was moving on its own, word finding difficulty and difficulty understanding. Wife called EMS. On initial encounter, NIHSS was 6 but later after imaging his NIHSS was 2 (could not answer month, not able to repeat sentence clearly). MRS was 0.      SUBJECTIVE: No events overnight.  No new neurologic complaints.      aspirin enteric coated 81 milliGRAM(s) Oral daily  atorvastatin 80 milliGRAM(s) Oral at bedtime  clopidogrel Tablet 75 milliGRAM(s) Oral daily  enoxaparin Injectable 40 milliGRAM(s) SubCutaneous every 24 hours  influenza   Vaccine 0.5 milliLiter(s) IntraMuscular once      PHYSICAL EXAM:   Vital Signs Last 24 Hrs  T(C): 36.3 (30 Mar 2018 05:02), Max: 36.8 (29 Mar 2018 11:52)  T(F): 97.4 (30 Mar 2018 05:02), Max: 98.3 (29 Mar 2018 11:52)  HR: 64 (30 Mar 2018 05:02) (64 - 88)  BP: 149/81 (30 Mar 2018 05:02) (135/83 - 171/79)  BP(mean): --  RR: 18 (30 Mar 2018 05:02) (18 - 20)  SpO2: 98% (30 Mar 2018 05:02) (98% - 99%)    General: No acute distress  HEENT: EOM intact, visual fields full  Abdomen: Soft, nontender, nondistended   Extremities: No edema    NEUROLOGICAL EXAM:  Mental status: he is awake and alert, oriented to choices, word finding difficulty, paraphasic errors, alexia, agraphia, naming impaired,  recognizes objects, follows 2 step commands, oromotor apraxia,   Cranial Nerves: No facial asymmetry, no nystagmus, no dysarthria,  tongue midline  Motor exam: Normal tone, no drift, 5/5 RUE, 5/5 RLE, 5/5 LUE, 5/5 LLE, normal fine finger movements.  Sensation: Intact to light touch   Coordination/ Gait: No dysmetria, TIRSO intact and symmetric bilaterally      LABS:                        16.2   8.6   )-----------( 237      ( 28 Mar 2018 10:06 )             47.5    03-30    142  |  109<H>  |  24<H>  ----------------------------<  98  3.8   |  22  |  0.83    Ca    10.2      30 Mar 2018 06:09    TPro  6.8  /  Alb  3.9  /  TBili  0.5  /  DBili  x   /  AST  21  /  ALT  19  /  AlkPhos  80  03-28  PT/INR - ( 28 Mar 2018 10:06 )   PT: 11.6 sec;   INR: 1.06 ratio         PTT - ( 28 Mar 2018 10:06 )  PTT:54.9 sec  Hemoglobin A1C, Whole Blood: 5.1 % (03-28 @ 15:29)  Hemoglobin A1C, Whole Blood: 5.1 % (03-28 @ 15:29)      IMAGING: Reviewed by me.   CT Angio Neck, CTA brain, CT perfusion w/ IV Cont (03.28.18)   CT angiography neck: No evidence of hemodynamically significant stenosis   using NASCET criteria.  Patent vertebral arteries.  No evidence of   vascular dissection.    CT angiography brain: No major vessel occlusion or hemodynamically   significant proximal stenosis.      CT perfusion: Areas of perfusion mismatch in the left frontal and   parietal lobes compatible with early ischemia.      MR Head No Cont (03.28.18)    Scattered regions of acute infarction within the left MCA   territory as evidenced by foci of restricted diffusion with   hyperintensity on the DWI. No associated hemorrhage or mass effect at   this time. No other regions of infarct.. THE PATIENT WAS SEEN AND EXAMINED BY ME WITH THE HOUSESTAFF AND STROKE TEAM DURING MORNING ROUNDS.   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 As per patient, he went to bed at 12 am morning of admission and woke up with symptoms of weakness in right hand, right hand was moving on its own, word finding difficulty and difficulty understanding. Wife called EMS. On initial encounter, NIHSS was 6 but later after imaging his NIHSS was 2 (could not answer month, not able to repeat sentence clearly). MRS was 0.    SUBJECTIVE: No events overnight.  No new neurologic complaints.      aspirin enteric coated 81 milliGRAM(s) Oral daily  atorvastatin 80 milliGRAM(s) Oral at bedtime  clopidogrel Tablet 75 milliGRAM(s) Oral daily  enoxaparin Injectable 40 milliGRAM(s) SubCutaneous every 24 hours  influenza   Vaccine 0.5 milliLiter(s) IntraMuscular once      PHYSICAL EXAM:   Vital Signs Last 24 Hrs  T(C): 36.3 (30 Mar 2018 05:02), Max: 36.8 (29 Mar 2018 11:52)  T(F): 97.4 (30 Mar 2018 05:02), Max: 98.3 (29 Mar 2018 11:52)  HR: 64 (30 Mar 2018 05:02) (64 - 88)  BP: 149/81 (30 Mar 2018 05:02) (135/83 - 171/79)  BP(mean): --  RR: 18 (30 Mar 2018 05:02) (18 - 20)  SpO2: 98% (30 Mar 2018 05:02) (98% - 99%)    General: No acute distress  HEENT: EOM intact, visual fields full  Abdomen: Soft, nontender, nondistended   Extremities: No edema    NEUROLOGICAL EXAM:  Mental status: he is awake and alert, oriented to choices, word finding difficulty, paraphasic errors, alexia, agraphia, naming impaired,  recognizes objects, follows 2 step commands, oromotor apraxia,   Cranial Nerves: No facial asymmetry, no nystagmus, no dysarthria,  tongue midline  Motor exam: Normal tone, no drift, 5/5 RUE, 5/5 RLE, 5/5 LUE, 5/5 LLE, normal fine finger movements.  Sensation: Intact to light touch   Coordination/ Gait: No dysmetria, TIRSO intact and symmetric bilaterally      LABS:                        16.2   8.6   )-----------( 237      ( 28 Mar 2018 10:06 )             47.5    03-30    142  |  109<H>  |  24<H>  ----------------------------<  98  3.8   |  22  |  0.83    Ca    10.2      30 Mar 2018 06:09    TPro  6.8  /  Alb  3.9  /  TBili  0.5  /  DBili  x   /  AST  21  /  ALT  19  /  AlkPhos  80  03-28  PT/INR - ( 28 Mar 2018 10:06 )   PT: 11.6 sec;   INR: 1.06 ratio         PTT - ( 28 Mar 2018 10:06 )  PTT:54.9 sec  Hemoglobin A1C, Whole Blood: 5.1 % (03-28 @ 15:29)  Hemoglobin A1C, Whole Blood: 5.1 % (03-28 @ 15:29)      IMAGING: Reviewed by me.   CT Angio Neck, CTA brain, CT perfusion w/ IV Cont (03.28.18)   CT angiography neck: No evidence of hemodynamically significant stenosis   using NASCET criteria.  Patent vertebral arteries.  No evidence of   vascular dissection.    CT angiography brain: No major vessel occlusion or hemodynamically   significant proximal stenosis.      CT perfusion: Areas of perfusion mismatch in the left frontal and   parietal lobes compatible with early ischemia.      MR Head No Cont (03.28.18)    Scattered regions of acute infarction within the left MCA   territory as evidenced by foci of restricted diffusion with   hyperintensity on the DWI. No associated hemorrhage or mass effect at   this time. No other regions of infarct..

## 2018-03-30 NOTE — DISCHARGE NOTE ADULT - NS AS DC STROKE ED MATERIALS
Stroke Education Booklet/Stroke Warning Signs and Symptoms/Need for Followup After Discharge/Call 911 for Stroke/Risk Factors for Stroke/Prescribed Medications

## 2018-03-30 NOTE — DISCHARGE NOTE ADULT - MEDICATION SUMMARY - MEDICATIONS TO TAKE
I will START or STAY ON the medications listed below when I get home from the hospital:    aspirin 81 mg oral delayed release tablet  -- 1 tab(s) by mouth once a day  -- Indication: For Stroke     escitalopram 10 mg oral tablet  -- 1 tab(s) by mouth once a day    Note: prescribed by PCP on monday 3/26/18, but patient may not have started yet    -- Indication: For Mood     meclizine 25 mg oral tablet  -- 1 tab(s) by mouth 3 times a day prn  -- May cause drowsiness.  Alcohol may intensify this effect.  Use care when operating dangerous machinery.    -- Indication: For Dizziness     atorvastatin 80 mg oral tablet  -- 1 tab(s) by mouth once a day (at bedtime)  -- Indication: For Cholesterol     clopidogrel 75 mg oral tablet  -- 1 tab(s) by mouth once a day  -- Indication: For Stroke     amLODIPine 5 mg oral tablet  -- 1 tab(s) by mouth once a day  -- Indication: For HTN     Robaxin-750 oral tablet  -- 1 tab(s) by mouth 3 times a day prn pain. Do not drink alcohol or drive while taking this medication.  -- May cause drowsiness.  Alcohol may intensify this effect.  Use care when operating dangerous machinery.    -- Indication: For Muscle pain

## 2018-03-30 NOTE — CONSULT NOTE ADULT - ASSESSMENT
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 Imaging results Cerebral embolism with infarction, left MCA, likely atheroembolic from left carotid stenosis. The stenosis is too mild to require CEA or GERALD. EP consult for  screening  for competing cardioembolic etiologies such as PAF with outpatient long term telemetry monitoring. 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 Imaging results Cerebral embolism with infarction, left MCA.   EP consult for  screening  for cardioembolic etiologies such as PAF with outpatient long term telemetry monitoring. 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 Imaging results Cerebral embolism with infarction, left MCA.   EP consult for  screening  for cardioembolic etiologies such as PAF with outpatient long term telemetry monitoring.

## 2018-03-30 NOTE — DISCHARGE NOTE ADULT - PATIENT PORTAL LINK FT
You can access the [facility], offered by [facility], by registering with the following website: [email or web address]

## 2018-03-30 NOTE — DISCHARGE NOTE ADULT - CARE PROVIDER_API CALL
Darnell López), Neurology; Vascular Neurology  [address]  [address] [place] [address]  Phone: [phone]  Fax: [phone]

## 2018-03-30 NOTE — DISCHARGE NOTE ADULT - PLAN OF CARE
Prevent recurrent strokes Please take Aspirin and plavix as direction. You should follow up in the outpatient Neurovascular clinic in 10-14 days for continued monitoring and management. The number is listed below. You can make an appointment with Dr. López or the soonest available physician. LDL goal < 70 PLease take cholesterol medication as prescribed. Control blood pressure Take blood pressure medication as prescribed. You should follow with your PCP as an outpatient for continued monitoring and management. Please take cholesterol medication as prescribed.

## 2018-03-30 NOTE — DISCHARGE NOTE ADULT - OTHER SIGNIFICANT FINDINGS
CT Angio Neck, CTA brain, CT perfusion w/ IV Cont (03.28.18)   CT angiography neck: No evidence of hemodynamically significant stenosis   using NASCET criteria.  Patent vertebral arteries.  No evidence of   vascular dissection.    CT angiography brain: No major vessel occlusion or hemodynamically   significant proximal stenosis.      CT perfusion: Areas of perfusion mismatch in the left frontal and   parietal lobes compatible with early ischemia.      MR Head No Cont (03.28.18)    Scattered regions of acute infarction within the left MCA   territory as evidenced by foci of restricted diffusion with   hyperintensity on the DWI. No associated hemorrhage or mass effect at   this time. No other regions of infarct..

## 2018-03-30 NOTE — PROGRESS NOTE ADULT - ASSESSMENT
ASSESSMENT: 67M with HLD, past TBI, who presented with new aphasia and right hemiparesis. Last seen well at 11 pm last night noted went to bed around midnight, so not an IV tPA candidate and no large vessel occlusion with low NIHSS, so not an endovascular candidate. CT head is normal, and CTA shows no intracranial stenosis or occlusion, and a left carotid bifurcation atherosclerotic plaque with mild stenosis.  Impression: Cerebral embolism with infarction, left MCA, likely atheroembolic from left carotid stenosis. The stenosis is too mild to require CEA or GERALD.  Would screen for competing cardioembolic etiologies such as PAF with outpatient long term telemetry monitoring.    NEURO: Continue close monitoring for neurologic deterioration, permissive HTN with gradual titration to normotension in setting of carotid stenosis,, titrate statin to LDL goal less than 70, MR imaging as noted above,  Physical therapy/OT/Speech eval pending.    ANTITHROMBOTIC THERAPY: ASA/Plavix    PULMONARY: CXR clear, protecting airway, saturating well     CARDIOVASCULAR: check TTE, cardiac monitoring, will discuss with him the [employer] AF study since he is eligible.                              SBP goal: gradual titration to normotension    GASTROINTESTINAL:  dysphagia screen passed, on puree diet, advance as tolerated      Diet: puree     RENAL: BUN/Cr without acute change, maintain adequate hydration, good urine output      Na Goal: Greater than 135     Jones:n     HEMATOLOGY: H/H without acute change, no active bleeding, Platelets 237     DVT ppx: Heparin s.c [] LMWH [x]     ID: afebrile, no leukocytosis     OTHER: current medical condition and plan of care d/w patient and wife at bedside, all questions answered and concerns addressed.    DISPOSITION: AR, medically cleared for discharge.      CORE MEASURES:        Admission NIHSS: 6     TPA: [] YES [x] NO      LDL/HDL:127/44     Depression Screen: p     Statin Therapy: y      Dysphagia Screen: [x] PASS [] FAIL     Smoking [] YES [x] NO      Afib [] YES [x] NO     Stroke Education [] YES [] NO -pending ASSESSMENT: 67M with HLD, past TBI, who presented with new aphasia and right hemiparesis. Last seen well at 11 pm last night noted went to bed around midnight, so not an IV tPA candidate and no large vessel occlusion with low NIHSS, so not an endovascular candidate. CT head is normal, and CTA shows no intracranial stenosis or occlusion, and a left carotid bifurcation atherosclerotic plaque with mild stenosis.  Impression: Cerebral embolism with infarction, left MCA, likely atheroembolic from left carotid stenosis. The stenosis is too mild to require CEA or GERALD.  Would screen for competing cardioembolic etiologies such as PAF with outpatient long term telemetry monitoring.    NEURO: Neurologically without acute change, with improving expressive aphasia, continue close monitoring for neurologic deterioration, slow titration to normotension, titrate statin to LDL goal less than 70, MR imaging as noted above,  Physical therapy/OT with recommendation for TBI.    ANTITHROMBOTIC THERAPY: ASA/Plavix    PULMONARY: CXR clear, protecting airway, saturating well     CARDIOVASCULAR: pending TTE today, cardiac monitoring, ILR pending TTE results, to assess for occult arrhythmia like Afib as possible source of stroke     SBP goal: gradual titration to normotension    GASTROINTESTINAL:  dysphagia screen passed, on puree diet, advance as tolerated      Diet: puree     RENAL: BUN/Cr without acute change, maintain adequate hydration, good urine output      Na Goal: Greater than 135     Jones:n     HEMATOLOGY: H/H without acute change, no active bleeding, Platelets 237     DVT ppx: Heparin s.c [] LMWH [x]     ID: afebrile, no leukocytosis     OTHER: current medical condition and plan of care d/w patient and wife at bedside, all questions answered and concerns addressed.    DISPOSITION: AR, medically cleared for discharge, scheduled for tomorrow 10am.      CORE MEASURES:        Admission NIHSS: 6     TPA: [] YES [x] NO      LDL/HDL:127/44     Depression Screen: p     Statin Therapy: y      Dysphagia Screen: [x] PASS [] FAIL     Smoking [] YES [x] NO      Afib [] YES [x] NO     Stroke Education [] YES [] NO -pending

## 2018-03-30 NOTE — CONSULT NOTE ADULT - SUBJECTIVE AND OBJECTIVE BOX
Chief Complaint : Stroke     HPI:  67 Y white R Male with PMH of anxiety, HLD, TBI(3 years back)  was BIBEMS for aphasia. As per patient, he went to bed at 1 am and woke up with symptoms of weakness in right hand, right hand was moving on its own, word finding difficulty and difficulty understanding. Wife called EMS. On initial encounter, NIHSS was 6 but later after imaging his NIHSS was 2 (could not answer month, not able to repeat sentence clearly). MRS was 0. He denied numbness, dizziness, change in vision. He had problem with repetition and following commands. (28 Mar 2018 07:38)    PAST MEDICAL & SURGICAL HISTORY:  TBI (traumatic brain injury)  Anxiety  Hypertension  High cholesterol    SOCIAL HISTORY: 25 year smoking history  FAMILY HISTORY:      MEDICATIONS  (STANDING):  amLODIPine   Tablet 5 milliGRAM(s) Oral daily  aspirin enteric coated 81 milliGRAM(s) Oral daily  atorvastatin 80 milliGRAM(s) Oral at bedtime  clopidogrel Tablet 75 milliGRAM(s) Oral daily  enoxaparin Injectable 40 milliGRAM(s) SubCutaneous every 24 hours  influenza   Vaccine 0.5 milliLiter(s) IntraMuscular once    MEDICATIONS  (PRN):  Allergies shellfish (Unknown)  Tylenol (Hives)  REVIEW OF SYSTEM:   Constitutional: denies fever, chills, fatigue  Neuro: denies headache, numbness, weakness, states has difficulty speaking, expressing self espcially with numbers and dates but getting better   Resp: denies cough, wheezing, shortness of breath  CVS: denies chest pain, palpitations, leg swelling  GI: denies abdominal pain, nausea, vomiting, diarrhea   : denies dysuria, frequency, incontinence  Skin: denies itching, burning, rashes, or lesions   Msk: denies joint pain or swelling    Vital Signs Last 24 Hrs  T(C): 36.7 (30 Mar 2018 11:54), Max: 36.8 (29 Mar 2018 16:02)  T(F): 98 (30 Mar 2018 11:54), Max: 98.2 (29 Mar 2018 16:02)  HR: 66 (30 Mar 2018 11:54) (63 - 83)  BP: 141/75 (30 Mar 2018 11:54) (135/83 - 171/79)  RR: 18 (30 Mar 2018 11:54) (18 - 18)  SpO2: 98% (30 Mar 2018 11:54) (98% - 99%)    Physical Exam:  General : well developed, well nourished,  and no acute distress  Neuro : Alert and oriented x 3, word finding difficulty BLE BUE strenght 4/5  HEENT : Sclera clear, no JVD, no Lymphadenopathy no carotid bruits, neck supple  Lungs: Clear to Ascultation, no wheezing , rales or rhonchi   Cardiovascular : + 1 +2, RRR, no murmurs, no rubs  GI : abdomen soft, NT, ND, + BS   : no suprapubic tenderness  Extremities : No edema, + 2 DP and +2 PT, feet warm   Skin : warm dry     TELE: SR 70's  EKG:    LABS:                        15.7   8.36  )-----------( 234      ( 30 Mar 2018 07:43 )             45.0     03-30    142  |  109<H>  |  24<H>  ----------------------------<  98  3.8   |  22  |  0.83    Ca    10.2      30 Mar 2018 06:09    RADIOLOGY & ADDITIONAL STUDIES:    CT Angio Neck, CTA brain, CT perfusion w/ IV Cont (03.28.18)   CT angiography neck: No evidence of hemodynamically significant stenosis   using NASCET criteria.  Patent vertebral arteries.  No evidence of   vascular dissection.    CT angiography brain: No major vessel occlusion or hemodynamically   significant proximal stenosis.      CT perfusion: Areas of perfusion mismatch in the left frontal and   parietal lobes compatible with early ischemia.      MR Head No Cont (03.28.18)    Scattered regions of acute infarction within the left MCA   territory as evidenced by foci of restricted diffusion with   hyperintensity on the DWI. No associated hemorrhage or mass effect at   this time. No other regions of infarct.. Chief Complaint : Stroke     HPI:  67 Y white  Male with PMH of anxiety, HLD, TBI(3 years back)  was BIBEMS for aphasia. As per patient, he went to bed at 1 am and woke up with symptoms of weakness in right hand, right hand was moving on its own, word finding difficulty and difficulty understanding. Wife called EMS. On initial encounter, NIHSS was 6 but later after imaging his NIHSS was 2 (could not answer month, not able to repeat sentence clearly). MRS was 0. He denied numbness, dizziness, change in vision. He had problem with repetition and following commands. (28 Mar 2018 07:38)    PAST MEDICAL & SURGICAL HISTORY:  TBI (traumatic brain injury)  Anxiety  Hypertension  High cholesterol    SOCIAL HISTORY: 25 year smoking history  FAMILY HISTORY:      MEDICATIONS  (STANDING):  amLODIPine   Tablet 5 milliGRAM(s) Oral daily  aspirin enteric coated 81 milliGRAM(s) Oral daily  atorvastatin 80 milliGRAM(s) Oral at bedtime  clopidogrel Tablet 75 milliGRAM(s) Oral daily  enoxaparin Injectable 40 milliGRAM(s) SubCutaneous every 24 hours  influenza   Vaccine 0.5 milliLiter(s) IntraMuscular once    MEDICATIONS  (PRN):  Allergies shellfish (Unknown)  Tylenol (Hives)  REVIEW OF SYSTEM:   Constitutional: denies fever, chills, fatigue  Neuro: denies headache, numbness, weakness, states has difficulty speaking, expressing self espcially with numbers and dates but getting better   Resp: denies cough, wheezing, shortness of breath  CVS: denies chest pain, palpitations, leg swelling  GI: denies abdominal pain, nausea, vomiting, diarrhea   : denies dysuria, frequency, incontinence  Skin: denies itching, burning, rashes, or lesions   Msk: denies joint pain or swelling    Vital Signs Last 24 Hrs  T(C): 36.7 (30 Mar 2018 11:54), Max: 36.8 (29 Mar 2018 16:02)  T(F): 98 (30 Mar 2018 11:54), Max: 98.2 (29 Mar 2018 16:02)  HR: 66 (30 Mar 2018 11:54) (63 - 83)  BP: 141/75 (30 Mar 2018 11:54) (135/83 - 171/79)  RR: 18 (30 Mar 2018 11:54) (18 - 18)  SpO2: 98% (30 Mar 2018 11:54) (98% - 99%)    Physical Exam:  General : well developed, well nourished,  and no acute distress  Neuro : Alert and oriented x 3, word finding difficulty BLE BUE strenght 4/5  HEENT : Sclera clear, no JVD, no Lymphadenopathy no carotid bruits, neck supple  Lungs: Clear to Ascultation, no wheezing , rales or rhonchi   Cardiovascular : + 1 +2, RRR, no murmurs, no rubs  GI : abdomen soft, NT, ND, + BS   : no suprapubic tenderness  Extremities : No edema, + 2 DP and +2 PT, feet warm   Skin : warm dry     TELE: SR 70's  EKG:    LABS:                        15.7   8.36  )-----------( 234      ( 30 Mar 2018 07:43 )             45.0     03-30    142  |  109<H>  |  24<H>  ----------------------------<  98  3.8   |  22  |  0.83    Ca    10.2      30 Mar 2018 06:09    RADIOLOGY & ADDITIONAL STUDIES:    CT Angio Neck, CTA brain, CT perfusion w/ IV Cont (03.28.18)   CT angiography neck: No evidence of hemodynamically significant stenosis   using NASCET criteria.  Patent vertebral arteries.  No evidence of   vascular dissection.    CT angiography brain: No major vessel occlusion or hemodynamically   significant proximal stenosis.      CT perfusion: Areas of perfusion mismatch in the left frontal and   parietal lobes compatible with early ischemia.      MR Head No Cont (03.28.18)    Scattered regions of acute infarction within the left MCA   territory as evidenced by foci of restricted diffusion with   hyperintensity on the DWI. No associated hemorrhage or mass effect at   this time. No other regions of infarct..

## 2018-03-30 NOTE — DISCHARGE NOTE ADULT - CARE PLAN
Principal Discharge DX:	Cerebral ischemia  Goal:	Prevent recurrent strokes  Assessment and plan of treatment:	Please take Aspirin and plavix as direction. You should follow up in the outpatient Neurovascular clinic in 10-14 days for continued monitoring and management. The number is listed below. You can make an appointment with Dr. López or the soonest available physician.  Secondary Diagnosis:	High cholesterol  Goal:	LDL goal < 70  Assessment and plan of treatment:	PLease take cholesterol medication as prescribed.  Secondary Diagnosis:	Hypertension, unspecified type  Goal:	Control blood pressure  Assessment and plan of treatment:	Take blood pressure medication as prescribed. You should follow with your PCP as an outpatient for continued monitoring and management.

## 2018-03-30 NOTE — CONSULT NOTE ADULT - PROBLEM SELECTOR RECOMMENDATION 9
obtain EKG  ECHO prior to ILR implant today No atrial fibrillation on telemetry, no history of atrial fibrillation   ECHO prior to ILR implant today No atrial fibrillation on telemetry, no past medical history of atrial fibrillation   patient and wife agreeable ILR implant today  ECHO prior to ILR implant today    addendum: 1800   S/P ILR implant today  Post Device teaching done with patient and wife , reviewed s/s bleeding and s/s of infection to call office if noted  plan return to EP clinic for post op wound check in 7 -10 days or after discharge from rehab   dispo F/U in EP clinic in 7-10 days for wound check appointment, [phone]   call for appointment on Monday when office opens     [phone]

## 2018-03-30 NOTE — DISCHARGE NOTE ADULT - HOSPITAL COURSE
67M with HLD, past TBI, who presented with new aphasia and right hemiparesis. Last seen well at 11 pm last night noted went to bed around midnight, so not an IV tPA candidate and no large vessel occlusion with low NIHSS, so not an endovascular candidate. CT head is normal, and CTA shows no intracranial stenosis or occlusion, and a left carotid bifurcation atherosclerotic plaque with mild stenosis.  Impression: Cerebral embolism with infarction, left MCA, likely atheroembolic from left carotid stenosis. The stenosis is too mild to require CEA or GERALD.  Would screen for competing cardioembolic etiologies such as PAF with outpatient long term telemetry monitoring.    Pt is neurologically stable for discharge. He should follow up with the Stroke clinic within 7-14 days. Also pt should follow with PCP for blood pressure management.     ANTITHROMBOTIC THERAPY: ASA/Plavix

## 2018-03-31 PROCEDURE — 99233 SBSQ HOSP IP/OBS HIGH 50: CPT

## 2018-03-31 RX ORDER — IBUPROFEN 200 MG
600 TABLET ORAL EVERY 6 HOURS
Qty: 0 | Refills: 0 | Status: DISCONTINUED | OUTPATIENT
Start: 2018-03-31 | End: 2018-04-03

## 2018-03-31 RX ORDER — ACETAMINOPHEN 500 MG
650 TABLET ORAL EVERY 6 HOURS
Qty: 0 | Refills: 0 | Status: DISCONTINUED | OUTPATIENT
Start: 2018-03-31 | End: 2018-03-31

## 2018-03-31 RX ADMIN — Medication 650 MILLIGRAM(S): at 00:48

## 2018-03-31 RX ADMIN — AMLODIPINE BESYLATE 5 MILLIGRAM(S): 2.5 TABLET ORAL at 05:03

## 2018-03-31 RX ADMIN — ATORVASTATIN CALCIUM 80 MILLIGRAM(S): 80 TABLET, FILM COATED ORAL at 21:49

## 2018-03-31 RX ADMIN — Medication 600 MILLIGRAM(S): at 17:01

## 2018-03-31 RX ADMIN — Medication 81 MILLIGRAM(S): at 12:48

## 2018-03-31 RX ADMIN — Medication 600 MILLIGRAM(S): at 18:01

## 2018-03-31 RX ADMIN — Medication 650 MILLIGRAM(S): at 01:18

## 2018-03-31 RX ADMIN — ENOXAPARIN SODIUM 40 MILLIGRAM(S): 100 INJECTION SUBCUTANEOUS at 17:03

## 2018-03-31 RX ADMIN — Medication 600 MILLIGRAM(S): at 08:24

## 2018-03-31 RX ADMIN — Medication 600 MILLIGRAM(S): at 09:28

## 2018-03-31 RX ADMIN — CLOPIDOGREL BISULFATE 75 MILLIGRAM(S): 75 TABLET, FILM COATED ORAL at 12:48

## 2018-03-31 NOTE — PROGRESS NOTE ADULT - SUBJECTIVE AND OBJECTIVE BOX
THE PATIENT WAS SEEN AND EXAMINED BY ME WITH THE HOUSESTAFF AND STROKE TEAM DURING MORNING ROUNDS.   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 As per patient, he went to bed at 12 am morning of admission and woke up with symptoms of weakness in right hand, right hand was moving on its own, word finding difficulty and difficulty understanding. Wife called EMS. On initial encounter, NIHSS was 6 but later after imaging his NIHSS was 2 (could not answer month, not able to repeat sentence clearly). MRS was 0.    SUBJECTIVE: No events overnight.  No new neurologic complaints.      amLODIPine   Tablet 5 milliGRAM(s) Oral daily  aspirin enteric coated 81 milliGRAM(s) Oral daily  atorvastatin 80 milliGRAM(s) Oral at bedtime  clopidogrel Tablet 75 milliGRAM(s) Oral daily  enoxaparin Injectable 40 milliGRAM(s) SubCutaneous every 24 hours  ibuprofen  Tablet 600 milliGRAM(s) Oral every 6 hours PRN  influenza   Vaccine 0.5 milliLiter(s) IntraMuscular once      PHYSICAL EXAM:   Vital Signs Last 24 Hrs  T(C): 36.4 (31 Mar 2018 07:54), Max: 36.7 (30 Mar 2018 11:54)  T(F): 97.5 (31 Mar 2018 07:54), Max: 98 (30 Mar 2018 11:54)  HR: 63 (31 Mar 2018 07:54) (60 - 80)  BP: 167/78 (31 Mar 2018 07:54) (141/75 - 168/80)  BP(mean): --  RR: 18 (31 Mar 2018 07:54) (18 - 18)  SpO2: 99% (31 Mar 2018 07:54) (95% - 99%)    General: No acute distress  HEENT: EOM intact, visual fields full  Abdomen: Soft, nontender, nondistended   Extremities: No edema    NEUROLOGICAL EXAM:  Mental status: he is awake and alert, oriented to choices, word finding difficulty, paraphasic errors, alexia, agraphia, naming impaired,  recognizes objects, follows 2 step commands, oromotor apraxia,   Cranial Nerves: No facial asymmetry, no nystagmus, no dysarthria,  tongue midline  Motor exam: Normal tone, no drift, 5/5 RUE, 5/5 RLE, 5/5 LUE, 5/5 LLE, normal fine finger movements.  Sensation: Intact to light touch   Coordination/ Gait: No dysmetria, TIRSO intact and symmetric bilaterally    LABS:                        15.7   8.36  )-----------( 234      ( 30 Mar 2018 07:43 )             45.0    03-30    142  |  109<H>  |  24<H>  ----------------------------<  98  3.8   |  22  |  0.83    Ca    10.2      30 Mar 2018 06:09      Hemoglobin A1C, Whole Blood: 5.1 % (03-28 @ 15:29)  Hemoglobin A1C, Whole Blood: 5.1 % (03-28 @ 15:29)      IMAGING: Reviewed by me.   CT Angio Neck, CTA brain, CT perfusion w/ IV Cont (03.28.18)   CT angiography neck: No evidence of hemodynamically significant stenosis   using NASCET criteria.  Patent vertebral arteries.  No evidence of   vascular dissection.    CT angiography brain: No major vessel occlusion or hemodynamically   significant proximal stenosis.      CT perfusion: Areas of perfusion mismatch in the left frontal and   parietal lobes compatible with early ischemia.      MR Head No Cont (03.28.18)    Scattered regions of acute infarction within the left MCA   territory as evidenced by foci of restricted diffusion with   hyperintensity on the DWI. No associated hemorrhage or mass effect at   this time. No other regions of infarct..

## 2018-03-31 NOTE — PROGRESS NOTE ADULT - ASSESSMENT
ASSESSMENT: 67M with HLD, past TBI, who presented with new aphasia and right hemiparesis. Last seen well at 11 pm last night noted went to bed around midnight, so not an IV tPA candidate and no large vessel occlusion with low NIHSS, so not an endovascular candidate. CT head is normal, and CTA shows no intracranial stenosis or occlusion, and a left carotid bifurcation atherosclerotic plaque with mild stenosis.  Impression: Cerebral embolism with infarction, left MCA, likely atheroembolic from left carotid stenosis. The stenosis is too mild to require CEA or GERALD.  Would screen for competing cardioembolic etiologies such as PAF with outpatient long term telemetry monitoring.    NEURO: Neurologically without acute change, with improving expressive aphasia, continue close monitoring for neurologic deterioration, slow titration to normotension, titrate statin to LDL goal less than 70, MR imaging as noted above,  Physical therapy/OT with recommendation for TBI.    ANTITHROMBOTIC THERAPY: ASA/Plavix    PULMONARY: CXR clear, protecting airway, saturating well     CARDIOVASCULAR: 3/30 TTE-normal LV dimensions and wall thicknesses, normal LV systolic function, indeterminate diastolic function, no PFO; s/p ICM to assess for occult arrhythmia like Afib as possible source of stroke, cardiac monitoring,      SBP goal: gradual titration to normotension    GASTROINTESTINAL:  dysphagia screen passed, advanced to regular, tolerating diet     Diet: Regular     RENAL: BUN/Cr without acute change, maintain adequate hydration, good urine output      Na Goal: Greater than 135     Jones:n     HEMATOLOGY: H/H without acute change, no active bleeding, Platelets 234     DVT ppx: Heparin s.c [] LMWH [x]     ID: afebrile, no leukocytosis, dressing dry and intact over ICM site    OTHER: current medical condition and plan of care d/w patient and wife at bedside, all questions answered and concerns addressed.    DISPOSITION: Discharge to rehab today.      CORE MEASURES:        Admission NIHSS: 6     TPA: [] YES [x] NO      LDL/HDL:127/44     Depression Screen: 0     Statin Therapy: y      Dysphagia Screen: [x] PASS [] FAIL     Smoking [] YES [x] NO      Afib [] YES [x] NO     Stroke Education [] YES [] NO -pending ASSESSMENT: 67M with HLD, past TBI, who presented with new aphasia and right hemiparesis. Last seen well at 11 pm last night noted went to bed around midnight, so not an IV tPA candidate and no large vessel occlusion with low NIHSS, so not an endovascular candidate. CT head is normal, and CTA shows no intracranial stenosis or occlusion, and a left carotid bifurcation atherosclerotic plaque with mild stenosis.  Impression: Cerebral embolism with infarction, left MCA, likely atheroembolic from left carotid stenosis. The stenosis is too mild to require CEA or GERALD.  Would screen for competing cardioembolic etiologies such as PAF with outpatient long term telemetry monitoring.    NEURO: Neurologically without acute change, remains with improving expressive aphasia, slow titration to normotension, titrate statin to LDL goal less than 70, MR imaging as noted above,  Physical therapy/OT with recommendation for TBI.    ANTITHROMBOTIC THERAPY: ASA/Plavix    PULMONARY: CXR clear, protecting airway, saturating well     CARDIOVASCULAR: 3/30 TTE-normal LV dimensions and wall thicknesses, normal LV systolic function, indeterminate diastolic function, no PFO; s/p ICM to assess for occult arrhythmia like Afib as possible source of stroke, cardiac monitoring,      SBP goal: gradual titration to normotension    GASTROINTESTINAL:  dysphagia screen passed, advanced to regular, tolerating diet     Diet: Regular     RENAL: BUN/Cr without acute change, maintain adequate hydration, good urine output      Na Goal: Greater than 135     Jones:n     HEMATOLOGY: H/H without acute change, no active bleeding, Platelets 234     DVT ppx: Heparin s.c [] LMWH [x]     ID: afebrile, no leukocytosis, dressing dry and intact over ICM site    OTHER:  Stroke NP and  RN spoke with wife about plan of care re: planned discharge today  she is requesting to grieve the discharge, risks and benefits discussed, discharge today cancelled, 24hr notice of discharge given, all questions answered and concerns addressed. She is visiting rehab facilities today and tomorrow prior to making choices, then will reach out for acceptance.    DISPOSITION: Discharge to rehab today.      CORE MEASURES:        Admission NIHSS: 6     TPA: [] YES [x] NO      LDL/HDL:127/44     Depression Screen: 0     Statin Therapy: y      Dysphagia Screen: [x] PASS [] FAIL     Smoking [] YES [x] NO      Afib [] YES [x] NO     Stroke Education [] YES [] NO -pending

## 2018-04-01 PROCEDURE — 99233 SBSQ HOSP IP/OBS HIGH 50: CPT

## 2018-04-01 RX ADMIN — Medication 600 MILLIGRAM(S): at 21:00

## 2018-04-01 RX ADMIN — ATORVASTATIN CALCIUM 80 MILLIGRAM(S): 80 TABLET, FILM COATED ORAL at 21:36

## 2018-04-01 RX ADMIN — Medication 600 MILLIGRAM(S): at 19:53

## 2018-04-01 RX ADMIN — ENOXAPARIN SODIUM 40 MILLIGRAM(S): 100 INJECTION SUBCUTANEOUS at 17:37

## 2018-04-01 RX ADMIN — CLOPIDOGREL BISULFATE 75 MILLIGRAM(S): 75 TABLET, FILM COATED ORAL at 12:40

## 2018-04-01 RX ADMIN — AMLODIPINE BESYLATE 5 MILLIGRAM(S): 2.5 TABLET ORAL at 05:41

## 2018-04-01 RX ADMIN — Medication 81 MILLIGRAM(S): at 12:40

## 2018-04-01 NOTE — PROGRESS NOTE ADULT - ASSESSMENT
ASSESSMENT: 67M with HLD, past TBI, who presented with new aphasia and right hemiparesis. Last seen well at 11 pm last night noted went to bed around midnight, so not an IV tPA candidate and no large vessel occlusion with low NIHSS, so not an endovascular candidate. CT head is normal, and CTA shows no intracranial stenosis or occlusion, and a left carotid bifurcation atherosclerotic plaque with mild stenosis.  Impression: Cerebral embolism with infarction, left MCA, likely atheroembolic from left carotid stenosis. The stenosis is too mild to require CEA or GERALD.  Would screen for competing cardioembolic etiologies such as PAF with outpatient long term telemetry monitoring.    NEURO: Neurologically without acute change, remains with improving expressive aphasia, slow titration to normotension, titrate statin to LDL goal less than 70, MR imaging as noted above,  Physical therapy/OT with recommendation for TBI.    ANTITHROMBOTIC THERAPY: ASA/Plavix    PULMONARY: CXR clear, protecting airway, saturating well     CARDIOVASCULAR: 3/30 TTE-normal LV dimensions and wall thicknesses, normal LV systolic function, indeterminate diastolic function, no PFO; s/p ICM to assess for occult arrhythmia like Afib as possible source of stroke, cardiac monitoring,      SBP goal: gradual titration to normotension    GASTROINTESTINAL:  dysphagia screen passed, advanced to regular, tolerating diet     Diet: Regular     RENAL: BUN/Cr without acute change, maintain adequate hydration, good urine output      Na Goal: Greater than 135     Jones:n     HEMATOLOGY: H/H without acute change, no active bleeding, Platelets 234     DVT ppx: Heparin s.c [] LMWH [x]     ID: afebrile, no leukocytosis, dressing dry and intact over ICM site    OTHER:  Stroke NP and  RN spoke with wife about plan of care re: planned discharge yesterday, she is requesting to grieve the discharge, risks and benefits discussed, discharge yesterday cancelled, 24hr notice of discharge given, all questions answered and concerns addressed. She is visiting rehab facilities yesterday and today prior to making choices, then will reach out for acceptance.    DISPOSITION: rehab pending- awaiting reviews for discharge from patient's wife.       CORE MEASURES:        Admission NIHSS: 6     TPA: [] YES [x] NO      LDL/HDL:127/44     Depression Screen: 0     Statin Therapy: y      Dysphagia Screen: [x] PASS [] FAIL     Smoking [] YES [x] NO      Afib [] YES [x] NO     Stroke Education [] YES [] NO -pending ASSESSMENT: 67M with HLD, past TBI, who presented with new aphasia and right hemiparesis. Last seen well at 11 pm last night noted went to bed around midnight, so not an IV tPA candidate and no large vessel occlusion with low NIHSS, so not an endovascular candidate. CT head is normal, and CTA shows no intracranial stenosis or occlusion, and a left carotid bifurcation atherosclerotic plaque with mild stenosis.  Impression: Cerebral embolism with infarction, left MCA, likely atheroembolic from left carotid stenosis. The stenosis is too mild to require CEA or GERALD.  Would screen for competing cardioembolic etiologies such as PAF with outpatient long term telemetry monitoring.    NEURO: Neurologically without acute change, remains with improving expressive aphasia, slow titration to normotension, titrate statin to LDL goal less than 70, MR imaging as noted above,  Physical therapy/OT with recommendation for TBI.    ANTITHROMBOTIC THERAPY: ASA/Plavix    PULMONARY: CXR clear, protecting airway, saturating well     CARDIOVASCULAR: 3/30 TTE-normal LV dimensions and wall thicknesses, normal LV systolic function, indeterminate diastolic function, no PFO; s/p ICM to assess for occult arrhythmia like Afib as possible source of stroke, cardiac monitoring,      SBP goal: gradual titration to normotension    GASTROINTESTINAL:  dysphagia screen passed, advanced to regular, tolerating diet     Diet: Regular     RENAL: BUN/Cr without acute change, maintain adequate hydration, good urine output      Na Goal: Greater than 135     Jones:n     HEMATOLOGY: H/H without acute change, no active bleeding, Platelets 234     DVT ppx: Heparin s.c [] LMWH [x]     ID: afebrile, no leukocytosis, dressing dry and intact over ICM site    OTHER:  Stroke NP and  RN spoke with wife about plan of care re: planned discharge yesterday, she is requesting to grieve the discharge, risks and benefits discussed, discharge yesterday cancelled, 24hr notice of discharge given, all questions answered and concerns addressed. She is visiting rehab facilities yesterday and today prior to making choices, then will reach out for acceptance. Wife and sons at bedside today, will provide facility for patient tomorrow     DISPOSITION: rehab pending- awaiting reviews for discharge from patient's wife.       CORE MEASURES:        Admission NIHSS: 6     TPA: [] YES [x] NO      LDL/HDL:127/44     Depression Screen: 0     Statin Therapy: y      Dysphagia Screen: [x] PASS [] FAIL     Smoking [] YES [x] NO      Afib [] YES [x] NO     Stroke Education [] YES [] NO -pending

## 2018-04-01 NOTE — PROGRESS NOTE ADULT - SUBJECTIVE AND OBJECTIVE BOX
THE PATIENT WAS SEEN AND EXAMINED BY ME WITH THE HOUSESTAFF AND STROKE TEAM DURING MORNING ROUNDS.   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 As per patient, he went to bed at 1 am and woke up with symptoms of weakness in right hand, right hand was moving on its own, word finding difficulty and difficulty understanding. Wife called EMS. On initial encounter, NIHSS was 6 but later after imaging his NIHSS was 2 (could not answer month, not able to repeat sentence clearly). MRS was 0. He denied numbness, dizziness, change in vision. He had problem with repetition and following commands.       SUBJECTIVE: No events overnight.  No new neurologic complaints.      amLODIPine   Tablet 5 milliGRAM(s) Oral daily  aspirin enteric coated 81 milliGRAM(s) Oral daily  atorvastatin 80 milliGRAM(s) Oral at bedtime  clopidogrel Tablet 75 milliGRAM(s) Oral daily  enoxaparin Injectable 40 milliGRAM(s) SubCutaneous every 24 hours  ibuprofen  Tablet 600 milliGRAM(s) Oral every 6 hours PRN  influenza   Vaccine 0.5 milliLiter(s) IntraMuscular once      PHYSICAL EXAM:   Vital Signs Last 24 Hrs  T(C): 36.5 (01 Apr 2018 08:16), Max: 36.6 (31 Mar 2018 23:42)  T(F): 97.7 (01 Apr 2018 08:16), Max: 97.8 (31 Mar 2018 23:42)  HR: 64 (01 Apr 2018 08:16) (64 - 68)  BP: 169/75 (01 Apr 2018 08:16) (142/78 - 169/75)  BP(mean): --  RR: 18 (01 Apr 2018 08:16) (18 - 18)  SpO2: 97% (01 Apr 2018 08:16) (96% - 98%)    General: No acute distress  HEENT: EOM intact, visual fields full  Abdomen: Soft, nontender, nondistended   Extremities: No edema    NEUROLOGICAL EXAM:  Mental status: he is awake and alert, oriented to choices, word finding difficulty, paraphasic errors, alexia, agraphia, naming impaired,  recognizes objects, follows 2 step commands, oromotor apraxia,   Cranial Nerves: No facial asymmetry, no nystagmus, no dysarthria,  tongue midline  Motor exam: Normal tone, no drift, 5/5 RUE, 5/5 RLE, 5/5 LUE, 5/5 LLE, normal fine finger movements.  Sensation: Intact to light touch   Coordination/ Gait: No dysmetria, TIRSO intact and symmetric bilaterally      LABS:       Hemoglobin A1C, Whole Blood: 5.1 % (03-28 @ 15:29)  Hemoglobin A1C, Whole Blood: 5.1 % (03-28 @ 15:29)      IMAGING: Reviewed by me.     CT Angio Neck, CTA brain, CT perfusion w/ IV Cont (03.28.18)   CT angiography neck: No evidence of hemodynamically significant stenosis   using NASCET criteria.  Patent vertebral arteries.  No evidence of   vascular dissection.    CT angiography brain: No major vessel occlusion or hemodynamically   significant proximal stenosis.      CT perfusion: Areas of perfusion mismatch in the left frontal and   parietal lobes compatible with early ischemia.      MR Head No Cont (03.28.18)    Scattered regions of acute infarction within the left MCA   territory as evidenced by foci of restricted diffusion with   hyperintensity on the DWI. No associated hemorrhage or mass effect at   this time. No other regions of infarct.

## 2018-04-02 PROCEDURE — 99222 1ST HOSP IP/OBS MODERATE 55: CPT

## 2018-04-02 PROCEDURE — 99233 SBSQ HOSP IP/OBS HIGH 50: CPT

## 2018-04-02 RX ADMIN — ENOXAPARIN SODIUM 40 MILLIGRAM(S): 100 INJECTION SUBCUTANEOUS at 17:20

## 2018-04-02 RX ADMIN — AMLODIPINE BESYLATE 5 MILLIGRAM(S): 2.5 TABLET ORAL at 05:34

## 2018-04-02 RX ADMIN — ATORVASTATIN CALCIUM 80 MILLIGRAM(S): 80 TABLET, FILM COATED ORAL at 23:29

## 2018-04-02 RX ADMIN — Medication 81 MILLIGRAM(S): at 12:05

## 2018-04-02 RX ADMIN — CLOPIDOGREL BISULFATE 75 MILLIGRAM(S): 75 TABLET, FILM COATED ORAL at 12:05

## 2018-04-02 NOTE — PROGRESS NOTE ADULT - SUBJECTIVE AND OBJECTIVE BOX
THE PATIENT WAS SEEN AND EXAMINED BY ME WITH THE HOUSESTAFF AND STROKE TEAM DURING MORNING ROUNDS.   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 As per patient, he went to bed at 1 am and woke up with symptoms of weakness in right hand, right hand was moving on its own, word finding difficulty and difficulty understanding. Wife called EMS. On initial encounter, NIHSS was 6 but later after imaging his NIHSS was 2 (could not answer month, not able to repeat sentence clearly). MRS was 0. He denied numbness, dizziness, change in vision. He had problem with repetition and following commands.    SUBJECTIVE: No events overnight.  No new neurologic complaints.      amLODIPine   Tablet 5 milliGRAM(s) Oral daily  aspirin enteric coated 81 milliGRAM(s) Oral daily  atorvastatin 80 milliGRAM(s) Oral at bedtime  clopidogrel Tablet 75 milliGRAM(s) Oral daily  enoxaparin Injectable 40 milliGRAM(s) SubCutaneous every 24 hours  ibuprofen  Tablet 600 milliGRAM(s) Oral every 6 hours PRN  influenza   Vaccine 0.5 milliLiter(s) IntraMuscular once      PHYSICAL EXAM:   Vital Signs Last 24 Hrs  T(C): 36.4 (02 Apr 2018 12:20), Max: 36.7 (01 Apr 2018 15:43)  T(F): 97.6 (02 Apr 2018 12:20), Max: 98 (01 Apr 2018 15:43)  HR: 63 (02 Apr 2018 12:20) (63 - 99)  BP: 154/76 (02 Apr 2018 12:20) (149/75 - 168/73)  BP(mean): --  RR: 18 (02 Apr 2018 12:20) (18 - 20)  SpO2: 98% (02 Apr 2018 12:20) (96% - 99%)    General: No acute distress  HEENT: EOM intact, visual fields full  Abdomen: Soft, nontender, nondistended   Extremities: No edema    NEUROLOGICAL EXAM:  Mental status: he is awake and alert, oriented to choices, word finding difficulty, repetition impaired, paraphasic errors,  naming impaired, recognizes objects, follows all commands, oromotor apraxia,   Cranial Nerves: R naso labial flattening, no nystagmus, no dysarthria,  tongue midline  Motor exam: Normal tone, no drift, 5-/5 RUE hand, 5-/5 RLE, 5/5 LUE, 5/5 LLE, normal fine finger movements.  Sensation: Intact to light touch   Coordination/ Gait: No dysmetria, TIRSO intact and symmetric bilaterally    LABS:         Hemoglobin A1C, Whole Blood: 5.1 % (03-28 @ 15:29)  Hemoglobin A1C, Whole Blood: 5.1 % (03-28 @ 15:29)      IMAGING: Reviewed by me.   CT Angio Neck, CTA brain, CT perfusion w/ IV Cont (03.28.18)   CT angiography neck: No evidence of hemodynamically significant stenosis   using NASCET criteria.  Patent vertebral arteries.  No evidence of   vascular dissection.    CT angiography brain: No major vessel occlusion or hemodynamically   significant proximal stenosis.      CT perfusion: Areas of perfusion mismatch in the left frontal and   parietal lobes compatible with early ischemia.      MR Head No Cont (03.28.18)    Scattered regions of acute infarction within the left MCA   territory as evidenced by foci of restricted diffusion with   hyperintensity on the DWI. No associated hemorrhage or mass effect at   this time. No other regions of infarct. THE PATIENT WAS SEEN AND EXAMINED BY ME WITH THE HOUSESTAFF AND STROKE TEAM DURING MORNING ROUNDS.     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 As per patient, he went to bed at 1 am and woke up with symptoms of weakness in right hand, right hand was moving on its own, word finding difficulty and difficulty understanding. Wife called EMS. On initial encounter, NIHSS was 6 but later after imaging his NIHSS was 2 (could not answer month, not able to repeat sentence clearly). MRS was 0. He denied numbness, dizziness, change in vision. He had problem with repetition and following commands.    SUBJECTIVE: No events overnight.  No new neurologic complaints.      ROS: All negative except documented above.    amLODIPine   Tablet 5 milliGRAM(s) Oral daily  aspirin enteric coated 81 milliGRAM(s) Oral daily  atorvastatin 80 milliGRAM(s) Oral at bedtime  clopidogrel Tablet 75 milliGRAM(s) Oral daily  enoxaparin Injectable 40 milliGRAM(s) SubCutaneous every 24 hours  ibuprofen  Tablet 600 milliGRAM(s) Oral every 6 hours PRN  influenza   Vaccine 0.5 milliLiter(s) IntraMuscular once      PHYSICAL EXAM:   Vital Signs Last 24 Hrs  T(C): 36.4 (02 Apr 2018 12:20), Max: 36.7 (01 Apr 2018 15:43)  T(F): 97.6 (02 Apr 2018 12:20), Max: 98 (01 Apr 2018 15:43)  HR: 63 (02 Apr 2018 12:20) (63 - 99)  BP: 154/76 (02 Apr 2018 12:20) (149/75 - 168/73)  BP(mean): --  RR: 18 (02 Apr 2018 12:20) (18 - 20)  SpO2: 98% (02 Apr 2018 12:20) (96% - 99%)    General: No acute distress  HEENT: EOM intact, visual fields full  Abdomen: Soft, nontender, nondistended   Extremities: No edema    NEUROLOGICAL EXAM:  Mental status: Awake and alert, oriented to choices, word finding difficulty, repetition impaired, paraphasic errors, naming impaired, recognizes objects, follows commands consistently    Cranial Nerves: R nasolabial fold flattening, no nystagmus, no dysarthria,  tongue midline  Motor exam: Normal tone, no drift, 5-/5 RUE hand, 5-/5 RLE, 5/5 LUE, 5/5 LLE, normal fine finger movements.  Sensation: Intact to light touch   Coordination/ Gait: No dysmetria, TIRSO intact and symmetric bilaterally    LABS:         Hemoglobin A1C, Whole Blood: 5.1 % (03-28 @ 15:29)  Hemoglobin A1C, Whole Blood: 5.1 % (03-28 @ 15:29)      IMAGING: Reviewed by me.   CT Angio Neck, CTA brain, CT perfusion w/ IV Cont (03.28.18)   CT angiography neck: No evidence of hemodynamically significant stenosis   using NASCET criteria.  Patent vertebral arteries.  No evidence of   vascular dissection.    CT angiography brain: No major vessel occlusion or hemodynamically   significant proximal stenosis.      CT perfusion: Areas of perfusion mismatch in the left frontal and   parietal lobes compatible with early ischemia.      MR Head No Cont (03.28.18)    Scattered regions of acute infarction within the left MCA   territory as evidenced by foci of restricted diffusion with   hyperintensity on the DWI. No associated hemorrhage or mass effect at   this time. No other regions of infarct.

## 2018-04-02 NOTE — CONSULT NOTE ADULT - SUBJECTIVE AND OBJECTIVE BOX
Cc: Aphasia    HPI:  67 Y white R Male with PMH of anxiety, HLD, TBI(3 years back)  was BIBEMS 3/28 for aphasia.   Patient had CT which was negative. MRI noted Left MCA CVA.  Patient has had persistent aphasia. Passed dysphagia screen.     REVIEW OF SYSTEMS: No chest pain, shortness of breath, nausea, vomiting or diarhea.      PAST MEDICAL & SURGICAL HISTORY  TBI (traumatic brain injury)  Anxiety  Hypertension  High cholesterol    FUNCTIONAL HISTORY:   Lives with spouse. Apt w 3 steps down and 2 steps up to enter.  PTA Independent with ambulation and ADLs    CURRENT FUNCTIONAL STATUS:      VITALS  T(C): 36.4 (04-02-18 @ 08:22), Max: 36.8 (04-01-18 @ 12:30)  HR: 76 (04-02-18 @ 08:22) (69 - 99)  BP: 168/73 (04-02-18 @ 08:22) (148/75 - 168/73)  RR: 18 (04-02-18 @ 08:22) (18 - 20)  SpO2: 97% (04-02-18 @ 08:22) (96% - 99%)  Wt(kg): --    ALLERGIES  acetaminophen containing compounds (Hives)  shellfish (Unknown)      MEDICATIONS   amLODIPine   Tablet 5 milliGRAM(s) Oral daily  aspirin enteric coated 81 milliGRAM(s) Oral daily  atorvastatin 80 milliGRAM(s) Oral at bedtime  clopidogrel Tablet 75 milliGRAM(s) Oral daily  enoxaparin Injectable 40 milliGRAM(s) SubCutaneous every 24 hours  ibuprofen  Tablet 600 milliGRAM(s) Oral every 6 hours PRN  influenza   Vaccine 0.5 milliLiter(s) IntraMuscular once      ----------------------------------------------------------------------------------------  PHYSICAL EXAM  Constitutional - NAD, Comfortable  HEENT - NCAT, EOMI  Neck - Supple, No limited ROM  Chest - CTA bilaterally, No wheeze, No rhonchi, No crackles  Cardiovascular - RRR, S1S2, No murmurs  Abdomen - BS+, Soft, NTND  Extremities - No C/C/E, No calf tenderness   Neurologic Exam -                    Cognitive - Awake, Alert, AAO to self, place, date, year, situation     Communication - Fluent, No dysarthria, no aphasia     Cranial Nerves - CN 2-12 intact     Motor - No focal deficits                       Sensory - Intact to LT     Reflexes - DTR Intact, No primitive reflexive     Balance - WNL Static  Psychiatric - Mood stable, Affect WNL      Impression:    yo with CVA with      Plan:  PT- ROM, Bed Mob, Transfers, Amb w AD and bracing as needed  OT- ADLs, bracing  SLP- Dysphagia eval and treat  Prec- Falls, Cardiac  DVT Prophylaxis  Skin- Turn q2 h  Dispo- Cc: Aphasia    HPI:  67 Y white R Male with PMH of anxiety, HLD, TBI(3 years back)  was BIBEMS 3/28 for aphasia.   Patient had CT which was negative. MRI noted Left MCA CVA.  Patient has had persistent aphasia. Passed dysphagia screen.     REVIEW OF SYSTEMS: unobtainable due to aphasia.      PAST MEDICAL & SURGICAL HISTORY  TBI (traumatic brain injury)  Anxiety  Hypertension  High cholesterol    FUNCTIONAL HISTORY:   Lives with spouse. Apt w 3 steps down and 2 steps up to enter.  PTA Independent with ambulation and ADLs    CURRENT FUNCTIONAL STATUS:  With PT/OT ambulating >100' S level and S with stairs    VITALS  T(C): 36.4 (04-02-18 @ 08:22), Max: 36.8 (04-01-18 @ 12:30)  HR: 76 (04-02-18 @ 08:22) (69 - 99)  BP: 168/73 (04-02-18 @ 08:22) (148/75 - 168/73)  RR: 18 (04-02-18 @ 08:22) (18 - 20)  SpO2: 97% (04-02-18 @ 08:22) (96% - 99%)  Wt(kg): --    ALLERGIES  acetaminophen containing compounds (Hives)  shellfish (Unknown)      MEDICATIONS   amLODIPine   Tablet 5 milliGRAM(s) Oral daily  aspirin enteric coated 81 milliGRAM(s) Oral daily  atorvastatin 80 milliGRAM(s) Oral at bedtime  clopidogrel Tablet 75 milliGRAM(s) Oral daily  enoxaparin Injectable 40 milliGRAM(s) SubCutaneous every 24 hours  ibuprofen  Tablet 600 milliGRAM(s) Oral every 6 hours PRN  influenza   Vaccine 0.5 milliLiter(s) IntraMuscular once    PHYSICAL EXAM  Constitutional - NAD, Comfortable  HEENT - NCAT, EOMI  Neck - Supple, No limited ROM  Chest - CTA bilaterally, No wheeze, No rhonchi, No crackles  Cardiovascular - RRR, S1S2, No murmurs  Abdomen - BS+, Soft, NTND  Extremities - No C/C/E, No calf tenderness   Neurologic Exam -                    Cognitive - Alert     Communication - + Non-fluent aphasia     Motor - No focal deficits     Balance - Sit to stand- S, amb Supervision, good static, fair dynamic balance    Impression:  68 yo with L MCA CVA with aphasia      Plan:  PT- ROM, Bed Mob, Transfers, Amb w AD   OT- ADLs  SLP- Aphasia  Prec- Falls, Cardiac  DVT Prophylaxis- Lovenox  Skin- Turn q2 h  Dispo- [facility]- had lengthy discussion with patient's family and PT/OT/Case Management regarding rehabilitation options.

## 2018-04-02 NOTE — PROGRESS NOTE ADULT - ASSESSMENT
ASSESSMENT: 67M with HLD, past TBI, who presented with new aphasia and right hemiparesis. Last seen well at 11 pm last night noted went to bed around midnight, so not an IV tPA candidate and no large vessel occlusion with low NIHSS, so not an endovascular candidate. CT head is normal, and CTA shows no intracranial stenosis or occlusion, and a left carotid bifurcation atherosclerotic plaque with mild stenosis.  Impression: Cerebral embolism with infarction, left MCA, likely atheroembolic from left carotid stenosis. The stenosis is too mild to require CEA or GERALD.  Would screen for competing cardioembolic etiologies such as PAF with outpatient long term telemetry monitoring.    NEURO: Neurologically without acute change, remains with improving expressive aphasia, slow titration to normotension, titrate statin to LDL goal less than 70, MR imaging as noted above,  Physical therapy/OT with recommendation for TBI.    ANTITHROMBOTIC THERAPY: ASA/Plavix    PULMONARY: CXR clear, protecting airway, saturating well     CARDIOVASCULAR: 3/30 TTE-normal LV dimensions and wall thicknesses, normal LV systolic function, indeterminate diastolic function, no PFO; s/p ICM to assess for occult arrhythmia like Afib as possible source of stroke, cardiac monitoring,      SBP goal: gradual titration to normotension    GASTROINTESTINAL:  dysphagia screen passed, advanced to regular, tolerating diet     Diet: Regular     RENAL: BUN/Cr without acute change, maintain adequate hydration, good urine output      Na Goal: Greater than 135     Jones:n     HEMATOLOGY: H/H without acute change, no active bleeding, Platelets 234     DVT ppx: Heparin s.c [] LMWH [x]     ID: afebrile, no leukocytosis, dressing dry and intact over ICM site    OTHER:  Stroke NP and  RN spoke with wife about plan of care re: planned discharge yesterday, she is requesting to grieve the discharge, risks and benefits discussed, discharge yesterday cancelled, 24hr notice of discharge given, all questions answered and concerns addressed. She is visiting rehab facilities yesterday and today prior to making choices, then will reach out for acceptance. Wife and sons at bedside today, will provide facility for patient tomorrow     DISPOSITION: rehab pending as per wife's request, she met with Dr. Slade, PMR, for re-evaluation and lengthy discussion re: rehab needs.  Awaiting acceptance.       CORE MEASURES:        Admission NIHSS: 6     TPA: [] YES [x] NO      LDL/HDL:127/44     Depression Screen: 0     Statin Therapy: y      Dysphagia Screen: [x] PASS [] FAIL     Smoking [] YES [x] NO      Afib [] YES [x] NO     Stroke Education [] YES [] NO -pending ASSESSMENT:   73 Y/O man with vascular risk factors of age and hyperlipidemia was admitted to [facility] for acute onset of aphasia and right hemiparesis. He was a candidate for IV tPA due to time window and not a candidate for mechanical embolectomy in absence of proximal intracranial large vessel occlusion and low NIHSS. MRI brain during hospitalization showed left MCA distribution embolic-looking infarct. CTA head and neck showed mild to moderate (about 50% stenosis to my eye) of extracranial/proximal left ICA without associated symptomatic intracranial stenosis. Transthoracic echocardiogram did not show any obvious structural cardiac source of embolism nor showed any evidence of a PFO.    Impression:  Cerebral embolism with cerebral infarction. Left MCA distribution stroke - likely etiology being cryptogenic stroke, probably related to embolism from a proximal source like cardiac source of embolism    NEURO: Neurologically without acute change, remains with improving expressive aphasia, slow titration to normotension, titrate statin to LDL goal less than 70, MR imaging as noted above,  Physical therapy/OT with recommendation for TBI.    ANTITHROMBOTIC THERAPY: Aspirin and clopidogrel for 3 weeks followed by aspirin for aggressive secondary stroke prevention    PULMONARY: CXR clear, protecting airway, saturating well     CARDIOVASCULAR: 3/30 TTE-normal LV dimensions and wall thicknesses, normal LV systolic function, indeterminate diastolic function, no PFO; s/p ICM to screen for occult cardiac arrhythmia like atrial fibrillation being the cardiac source of embolism, continue with cardiac monitoring     SBP goal: gradual titration to normotension    GASTROINTESTINAL:  dysphagia screen passed, advanced to regular, tolerating diet     Diet: Regular     RENAL: BUN/Cr without acute change, maintain adequate hydration, good urine output      Na Goal: Greater than 135     Jones:n     HEMATOLOGY: H/H without acute change, no active bleeding, Platelets 234     DVT ppx: Heparin s.c [] LMWH [x]     ID: afebrile, no leukocytosis, dressing dry and intact over ICM site    OTHER:  Stroke NP and  RN spoke with wife about plan of care re: planned discharge yesterday, she is requesting to grieve the discharge, risks and benefits discussed, discharge yesterday cancelled, 24hr notice of discharge given, all questions answered and concerns addressed. She is visiting rehab facilities yesterday and today prior to making choices, then will reach out for acceptance. sons at bedside today, will provide facility for patient tomorrow     DISPOSITION: rehab pending as per wife's request, she met with Dr. Slade, PMR, for re-evaluation and lengthy discussion re: rehab needs.  Awaiting acceptance.     CORE MEASURES:        Admission NIHSS: 6     TPA: [] YES [x] NO      LDL/HDL:127/44     Depression Screen: 0     Statin Therapy: y      Dysphagia Screen: [x] PASS [] FAIL     Smoking [] YES [x] NO      Afib [] YES [x] NO     Stroke Education [] YES [] NO -pending

## 2018-04-03 VITALS
TEMPERATURE: 98 F | OXYGEN SATURATION: 100 % | SYSTOLIC BLOOD PRESSURE: 168 MMHG | DIASTOLIC BLOOD PRESSURE: 78 MMHG | HEART RATE: 64 BPM | RESPIRATION RATE: 18 BRPM

## 2018-04-03 PROCEDURE — 85730 THROMBOPLASTIN TIME PARTIAL: CPT

## 2018-04-03 PROCEDURE — 86900 BLOOD TYPING SEROLOGIC ABO: CPT

## 2018-04-03 PROCEDURE — 86850 RBC ANTIBODY SCREEN: CPT

## 2018-04-03 PROCEDURE — 84295 ASSAY OF SERUM SODIUM: CPT

## 2018-04-03 PROCEDURE — 85610 PROTHROMBIN TIME: CPT

## 2018-04-03 PROCEDURE — 71045 X-RAY EXAM CHEST 1 VIEW: CPT

## 2018-04-03 PROCEDURE — 86901 BLOOD TYPING SEROLOGIC RH(D): CPT

## 2018-04-03 PROCEDURE — 97166 OT EVAL MOD COMPLEX 45 MIN: CPT

## 2018-04-03 PROCEDURE — 85014 HEMATOCRIT: CPT

## 2018-04-03 PROCEDURE — 99291 CRITICAL CARE FIRST HOUR: CPT

## 2018-04-03 PROCEDURE — C1764: CPT

## 2018-04-03 PROCEDURE — 82947 ASSAY GLUCOSE BLOOD QUANT: CPT

## 2018-04-03 PROCEDURE — 97530 THERAPEUTIC ACTIVITIES: CPT

## 2018-04-03 PROCEDURE — 80061 LIPID PANEL: CPT

## 2018-04-03 PROCEDURE — 80053 COMPREHEN METABOLIC PANEL: CPT

## 2018-04-03 PROCEDURE — 82435 ASSAY OF BLOOD CHLORIDE: CPT

## 2018-04-03 PROCEDURE — 93306 TTE W/DOPPLER COMPLETE: CPT

## 2018-04-03 PROCEDURE — 99233 SBSQ HOSP IP/OBS HIGH 50: CPT

## 2018-04-03 PROCEDURE — 82803 BLOOD GASES ANY COMBINATION: CPT

## 2018-04-03 PROCEDURE — 33282: CPT

## 2018-04-03 PROCEDURE — 84484 ASSAY OF TROPONIN QUANT: CPT

## 2018-04-03 PROCEDURE — 70496 CT ANGIOGRAPHY HEAD: CPT

## 2018-04-03 PROCEDURE — 84132 ASSAY OF SERUM POTASSIUM: CPT

## 2018-04-03 PROCEDURE — 80048 BASIC METABOLIC PNL TOTAL CA: CPT

## 2018-04-03 PROCEDURE — 70498 CT ANGIOGRAPHY NECK: CPT

## 2018-04-03 PROCEDURE — 97161 PT EVAL LOW COMPLEX 20 MIN: CPT

## 2018-04-03 PROCEDURE — 97535 SELF CARE MNGMENT TRAINING: CPT

## 2018-04-03 PROCEDURE — 70551 MRI BRAIN STEM W/O DYE: CPT

## 2018-04-03 PROCEDURE — 70450 CT HEAD/BRAIN W/O DYE: CPT

## 2018-04-03 PROCEDURE — G0515: CPT

## 2018-04-03 PROCEDURE — 82962 GLUCOSE BLOOD TEST: CPT

## 2018-04-03 PROCEDURE — 85027 COMPLETE CBC AUTOMATED: CPT

## 2018-04-03 PROCEDURE — 82553 CREATINE MB FRACTION: CPT

## 2018-04-03 PROCEDURE — 83605 ASSAY OF LACTIC ACID: CPT

## 2018-04-03 PROCEDURE — 82330 ASSAY OF CALCIUM: CPT

## 2018-04-03 PROCEDURE — 82550 ASSAY OF CK (CPK): CPT

## 2018-04-03 PROCEDURE — 92523 SPEECH SOUND LANG COMPREHEN: CPT

## 2018-04-03 PROCEDURE — 97116 GAIT TRAINING THERAPY: CPT

## 2018-04-03 PROCEDURE — 93005 ELECTROCARDIOGRAM TRACING: CPT

## 2018-04-03 PROCEDURE — 83036 HEMOGLOBIN GLYCOSYLATED A1C: CPT

## 2018-04-03 RX ADMIN — AMLODIPINE BESYLATE 5 MILLIGRAM(S): 2.5 TABLET ORAL at 05:56

## 2018-04-03 RX ADMIN — CLOPIDOGREL BISULFATE 75 MILLIGRAM(S): 75 TABLET, FILM COATED ORAL at 10:50

## 2018-04-03 RX ADMIN — Medication 81 MILLIGRAM(S): at 10:50

## 2018-04-03 NOTE — PROGRESS NOTE ADULT - SUBJECTIVE AND OBJECTIVE BOX
THE PATIENT WAS SEEN AND EXAMINED BY ME WITH THE HOUSESTAFF AND STROKE TEAM DURING MORNING ROUNDS.   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 As per patient, he went to bed at 1 am and woke up with symptoms of weakness in right hand, right hand was moving on its own, word finding difficulty and difficulty understanding. Wife called EMS. On initial encounter, NIHSS was 6 but later after imaging his NIHSS was 2 (could not answer month, not able to repeat sentence clearly). MRS was 0. He denied numbness, dizziness, change in vision. He had problem with repetition and following commands.      SUBJECTIVE: No events overnight.  No new neurologic complaints.      amLODIPine   Tablet 5 milliGRAM(s) Oral daily  aspirin enteric coated 81 milliGRAM(s) Oral daily  atorvastatin 80 milliGRAM(s) Oral at bedtime  clopidogrel Tablet 75 milliGRAM(s) Oral daily  enoxaparin Injectable 40 milliGRAM(s) SubCutaneous every 24 hours  ibuprofen  Tablet 600 milliGRAM(s) Oral every 6 hours PRN  influenza   Vaccine 0.5 milliLiter(s) IntraMuscular once      PHYSICAL EXAM:   Vital Signs Last 24 Hrs  T(C): 36.8 (03 Apr 2018 04:48), Max: 36.8 (03 Apr 2018 04:48)  T(F): 98.2 (03 Apr 2018 04:48), Max: 98.2 (03 Apr 2018 04:48)  HR: 73 (03 Apr 2018 04:48) (63 - 77)  BP: 163/67 (03 Apr 2018 04:48) (154/76 - 168/73)  BP(mean): --  RR: 18 (03 Apr 2018 04:48) (17 - 18)  SpO2: 98% (03 Apr 2018 04:48) (96% - 98%)    General: No acute distress  HEENT: EOM intact, visual fields full  Abdomen: Soft, nontender, nondistended   Extremities: No edema    NEUROLOGICAL EXAM:  Mental status: Awake and alert, oriented to choices, word finding difficulty, repetition impaired, paraphasic errors, naming impaired, recognizes objects, follows commands consistently    Cranial Nerves: R nasolabial fold flattening, no nystagmus, no dysarthria,  tongue midline  Motor exam: Normal tone, no drift, 5-/5 RUE hand, 5-/5 RLE, 5/5 LUE, 5/5 LLE, normal fine finger movements.  Sensation: Intact to light touch   Coordination/ Gait: No dysmetria, TIRSO intact and symmetric bilaterally      LABS:         Hemoglobin A1C, Whole Blood: 5.1 % (03-28 @ 15:29)  Hemoglobin A1C, Whole Blood: 5.1 % (03-28 @ 15:29)      IMAGING: Reviewed by me.   CT Angio Neck, CTA brain, CT perfusion w/ IV Cont (03.28.18)   CT angiography neck: No evidence of hemodynamically significant stenosis   using NASCET criteria.  Patent vertebral arteries.  No evidence of   vascular dissection.    CT angiography brain: No major vessel occlusion or hemodynamically   significant proximal stenosis.      CT perfusion: Areas of perfusion mismatch in the left frontal and   parietal lobes compatible with early ischemia.      MR Head No Cont (03.28.18)    Scattered regions of acute infarction within the left MCA   territory as evidenced by foci of restricted diffusion with   hyperintensity on the DWI. No associated hemorrhage or mass effect at   this time. No other regions of infarct. THE PATIENT WAS SEEN AND EXAMINED BY ME WITH THE HOUSESTAFF AND STROKE TEAM DURING MORNING ROUNDS.   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 As per patient, he went to bed at 1 am and woke up with symptoms of weakness in right hand, right hand was moving on its own, word finding difficulty and difficulty understanding. Wife called EMS. On initial encounter, NIHSS was 6 but later after imaging his NIHSS was 2 (could not answer month, not able to repeat sentence clearly). MRS was 0. He denied numbness, dizziness, change in vision. He had problem with repetition and following commands.    SUBJECTIVE: No events overnight.  No new neurologic complaints.      amLODIPine   Tablet 5 milliGRAM(s) Oral daily  aspirin enteric coated 81 milliGRAM(s) Oral daily  atorvastatin 80 milliGRAM(s) Oral at bedtime  clopidogrel Tablet 75 milliGRAM(s) Oral daily  enoxaparin Injectable 40 milliGRAM(s) SubCutaneous every 24 hours  ibuprofen  Tablet 600 milliGRAM(s) Oral every 6 hours PRN  influenza   Vaccine 0.5 milliLiter(s) IntraMuscular once      PHYSICAL EXAM:   Vital Signs Last 24 Hrs  T(C): 36.8 (03 Apr 2018 04:48), Max: 36.8 (03 Apr 2018 04:48)  T(F): 98.2 (03 Apr 2018 04:48), Max: 98.2 (03 Apr 2018 04:48)  HR: 73 (03 Apr 2018 04:48) (63 - 77)  BP: 163/67 (03 Apr 2018 04:48) (154/76 - 168/73)  BP(mean): --  RR: 18 (03 Apr 2018 04:48) (17 - 18)  SpO2: 98% (03 Apr 2018 04:48) (96% - 98%)    General: No acute distress, sitting in chair  HEENT: EOM intact, visual fields full  Abdomen: Soft, nontender, nondistended   Extremities: No edema    NEUROLOGICAL EXAM:  Mental status: Awake and alert, oriented to choices, word finding difficulty, repetition impaired, paraphasic errors, naming impaired, recognizes objects, follows commands consistently    Cranial Nerves: R nasolabial fold flattening, no nystagmus, no dysarthria,  tongue midline  Motor exam: Normal tone, no drift, 5-/5 RUE hand, 5-/5 RLE, 5/5 LUE, 5/5 LLE, normal fine finger movements.  Sensation: Intact to light touch   Coordination/ Gait: No dysmetria, TIRSO intact and symmetric bilaterally      LABS:         Hemoglobin A1C, Whole Blood: 5.1 % (03-28 @ 15:29)  Hemoglobin A1C, Whole Blood: 5.1 % (03-28 @ 15:29)      IMAGING: Reviewed by me.   CT Angio Neck, CTA brain, CT perfusion w/ IV Cont (03.28.18)   CT angiography neck: No evidence of hemodynamically significant stenosis   using NASCET criteria.  Patent vertebral arteries.  No evidence of   vascular dissection.    CT angiography brain: No major vessel occlusion or hemodynamically   significant proximal stenosis.      CT perfusion: Areas of perfusion mismatch in the left frontal and   parietal lobes compatible with early ischemia.      MR Head No Cont (03.28.18)    Scattered regions of acute infarction within the left MCA   territory as evidenced by foci of restricted diffusion with   hyperintensity on the DWI. No associated hemorrhage or mass effect at   this time. No other regions of infarct. THE PATIENT WAS SEEN AND EXAMINED BY ME WITH THE HOUSESTAFF AND STROKE TEAM DURING MORNING ROUNDS.     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 As per patient, he went to bed at 1 am and woke up with symptoms of weakness in right hand, right hand was moving on its own, word finding difficulty and difficulty understanding. Wife called EMS. On initial encounter, NIHSS was 6 but later after imaging his NIHSS was 2 (could not answer month, not able to repeat sentence clearly). MRS was 0. He denied numbness, dizziness, change in vision. He had problem with repetition and following commands.    SUBJECTIVE: No events overnight.  No new neurologic complaints.      ROS: All negative except documented above.    amLODIPine   Tablet 5 milliGRAM(s) Oral daily  aspirin enteric coated 81 milliGRAM(s) Oral daily  atorvastatin 80 milliGRAM(s) Oral at bedtime  clopidogrel Tablet 75 milliGRAM(s) Oral daily  enoxaparin Injectable 40 milliGRAM(s) SubCutaneous every 24 hours  ibuprofen  Tablet 600 milliGRAM(s) Oral every 6 hours PRN  influenza   Vaccine 0.5 milliLiter(s) IntraMuscular once    PHYSICAL EXAM:   Vital Signs Last 24 Hrs  T(C): 36.8 (03 Apr 2018 04:48), Max: 36.8 (03 Apr 2018 04:48)  T(F): 98.2 (03 Apr 2018 04:48), Max: 98.2 (03 Apr 2018 04:48)  HR: 73 (03 Apr 2018 04:48) (63 - 77)  BP: 163/67 (03 Apr 2018 04:48) (154/76 - 168/73)  BP(mean): --  RR: 18 (03 Apr 2018 04:48) (17 - 18)  SpO2: 98% (03 Apr 2018 04:48) (96% - 98%)    General: No acute distress, sitting in chair  HEENT: EOM intact, visual fields full  Abdomen: Soft, nontender, nondistended   Extremities: No edema    NEUROLOGICAL EXAM:  Mental status: Awake and alert, oriented to time, place and person when presented with choices, word finding difficulty, repetition impaired, semantic and phonemic paraphasic errors, naming impaired, recognizes objects, follows simple commands consistently    Cranial Nerves: R nasolabial fold flattening, no nystagmus, no dysarthria,  tongue midline  Motor exam: Normal tone, no drift, 5-/5 RUE hand, 5-/5 RLE, 5/5 LUE, 5/5 LLE, normal fine finger movements.  Sensation: Intact to light touch   Coordination/ Gait: No dysmetria, TIRSO intact and symmetric bilaterally    LABS:     Hemoglobin A1C, Whole Blood: 5.1 % (03-28 @ 15:29)  Hemoglobin A1C, Whole Blood: 5.1 % (03-28 @ 15:29)      IMAGING: Reviewed by me.   CT Angio Neck, CTA brain, CT perfusion w/ IV Cont (03.28.18)   CT angiography neck: No evidence of hemodynamically significant stenosis   using NASCET criteria.  Patent vertebral arteries.  No evidence of   vascular dissection.    CT angiography brain: No major vessel occlusion or hemodynamically   significant proximal stenosis.      CT perfusion: Areas of perfusion mismatch in the left frontal and   parietal lobes compatible with early ischemia.      MR Head No Cont (03.28.18)    Scattered regions of acute infarction within the left MCA   territory as evidenced by foci of restricted diffusion with   hyperintensity on the DWI. No associated hemorrhage or mass effect at   this time. No other regions of infarct.

## 2018-04-03 NOTE — PROGRESS NOTE ADULT - ASSESSMENT
ASSESSMENT:   71 Y/O man with vascular risk factors of age and hyperlipidemia was admitted to [facility] for acute onset of aphasia and right hemiparesis. He was a candidate for IV tPA due to time window and not a candidate for mechanical embolectomy in absence of proximal intracranial large vessel occlusion and low NIHSS. MRI brain during hospitalization showed left MCA distribution embolic-looking infarct. CTA head and neck showed mild to moderate (about 50% stenosis to my eye) of extracranial/proximal left ICA without associated symptomatic intracranial stenosis. Transthoracic echocardiogram did not show any obvious structural cardiac source of embolism nor showed any evidence of a PFO.    Impression:  Cerebral embolism with cerebral infarction. Left MCA distribution stroke - likely etiology being cryptogenic stroke, probably related to embolism from a proximal source like cardiac source of embolism    NEURO: Neurologically without acute change, remains with improving expressive aphasia, slow titration to normotension, titrate statin to LDL goal less than 70, MR imaging as noted above,  Physical therapy/OT with recommendation for TBI.    ANTITHROMBOTIC THERAPY: Aspirin and clopidogrel for 3 weeks followed by aspirin for aggressive secondary stroke prevention    PULMONARY: CXR clear, protecting airway, saturating well     CARDIOVASCULAR: 3/30 TTE-normal LV dimensions and wall thicknesses, normal LV systolic function, indeterminate diastolic function, no PFO; s/p ICM to screen for occult cardiac arrhythmia like atrial fibrillation being the cardiac source of embolism, continue with cardiac monitoring     SBP goal: gradual titration to normotension    GASTROINTESTINAL:  dysphagia screen passed, advanced to regular, tolerating diet     Diet: Regular     RENAL: BUN/Cr without acute change, maintain adequate hydration, good urine output      Na Goal: Greater than 135     Jones:n     HEMATOLOGY: H/H without acute change, no active bleeding, Platelets 234     DVT ppx: Heparin s.c [] LMWH [x]     ID: afebrile, no leukocytosis, dressing dry and intact over ICM site    OTHER:  Stroke NP and  RN spoke with wife about plan of care re: planned discharge on 3/31, she requested to grieve the discharge, risks and benefits discussed, discharge cancelled, 24hr notice of discharge given, all questions answered and concerns addressed. She visited rehab facilities over the weekend, came in Monday am wanting AR.  PMR came an re-evaluated and spoke to her at length.    DISPOSITION: rehab pending as per wife's request, she met with Dr. Slade, PMR, for re-evaluation and lengthy discussion re: rehab needs.  Anticipate d/c to [facility] today.    CORE MEASURES:        Admission NIHSS: 6     TPA: [] YES [x] NO      LDL/HDL:127/44     Depression Screen: 0     Statin Therapy: y      Dysphagia Screen: [x] PASS [] FAIL     Smoking [] YES [x] NO      Afib [] YES [x] NO     Stroke Education [] YES [] NO -pending ASSESSMENT:   73 Y/O man with vascular risk factors of age and hyperlipidemia was admitted to [facility] for acute onset of aphasia and right hemiparesis. He was a candidate for IV tPA due to time window and not a candidate for mechanical embolectomy in absence of proximal intracranial large vessel occlusion and low NIHSS. MRI brain during hospitalization showed left MCA distribution embolic-looking infarct. CTA head and neck showed mild to moderate (about 50% stenosis to my eye) of extracranial/proximal left ICA without associated symptomatic intracranial stenosis. Transthoracic echocardiogram did not show any obvious structural cardiac source of embolism nor showed any evidence of a PFO.    Impression:  Cerebral embolism with cerebral infarction. Left MCA distribution stroke - likely etiology being cryptogenic stroke, probably related to embolism from a proximal source like cardiac source of embolism    NEURO: Neurologically without acute change, remains with improving expressive aphasia, slow titration to normotension, titrate statin to LDL goal less than 70, MR imaging as noted above,  Physical therapy/OT with recommendation for TBI.    ANTITHROMBOTIC THERAPY: Aspirin and clopidogrel for 3 weeks followed by aspirin for aggressive secondary stroke prevention    PULMONARY: CXR clear, protecting airway, saturating well     CARDIOVASCULAR: 3/30 TTE-normal LV dimensions and wall thicknesses, normal LV systolic function, indeterminate diastolic function, no PFO; s/p ICM to screen for occult cardiac arrhythmia like atrial fibrillation being the cardiac source of embolism, continue with cardiac monitoring     SBP goal: gradual titration to normotension    GASTROINTESTINAL:  dysphagia screen passed, advanced to regular, tolerating diet     Diet: Regular     RENAL: BUN/Cr without acute change, maintain adequate hydration, good urine output      Na Goal: Greater than 135     Jones:n     HEMATOLOGY: H/H without acute change, no active bleeding, Platelets 234     DVT ppx: Heparin s.c [] LMWH [x]     ID: afebrile, no leukocytosis, dressing dry and intact over ICM site    OTHER:  Stroke NP and  RN spoke with wife about plan of care re: planned discharge on 3/31, she requested to grieve the discharge, risks and benefits discussed, discharge cancelled, 24hr notice of discharge given, all questions answered and concerns addressed. She visited rehab facilities over the weekend, came in Monday am wanting AR.  PMR came an re-evaluated and spoke to her at length.    DISPOSITION: rehab pending as per wife's request, she met with Dr. Slade, PMR, for re-evaluation and lengthy discussion re: rehab needs.  Anticipate d/c to [facility] today.    CORE MEASURES:        Admission NIHSS: 6     TPA: [] YES [x] NO      LDL/HDL:127/44     Depression Screen: 0, had discussion with patient yesterday, now depressed but does not want to start medication at this time.     Statin Therapy: y      Dysphagia Screen: [x] PASS [] FAIL     Smoking [] YES [x] NO      Afib [] YES [x] NO     Stroke Education [] YES [] NO -pending ASSESSMENT:   71 Y/O man with vascular risk factors of age and hyperlipidemia was admitted to [facility] for acute onset of aphasia and right hemiparesis. He was a candidate for IV tPA due to time window and not a candidate for mechanical embolectomy in absence of proximal intracranial large vessel occlusion and low NIHSS. MRI brain during hospitalization showed left MCA distribution embolic-looking infarct. CTA head and neck showed mild to moderate (about 50% stenosis to my eye) of extracranial/proximal left ICA without associated symptomatic intracranial stenosis. Transthoracic echocardiogram did not show any obvious structural cardiac source of embolism nor showed any evidence of a PFO.    Impression:  Cerebral embolism with cerebral infarction. Left MCA distribution stroke - likely etiology being cryptogenic stroke, probably related to embolism from a proximal source like cardiac source of embolism    NEURO: Neurologically without acute change, remains with improving expressive aphasia,  normotension, titrate statin to LDL goal less than 70, MR imaging as noted above,  Physical therapy/OT with recommendation [facility]    ANTITHROMBOTIC THERAPY: Aspirin and clopidogrel for 3 weeks followed by aspirin for aggressive secondary stroke prevention    PULMONARY: CXR clear, protecting airway, saturating well     CARDIOVASCULAR: 3/30 TTE-normal LV dimensions and wall thicknesses, normal LV systolic function, indeterminate diastolic function, no PFO; s/p ICM to screen for occult cardiac arrhythmia like atrial fibrillation being the cardiac source of embolism, continue with cardiac monitoring     SBP goal: gradual titration to normotension    GASTROINTESTINAL:  dysphagia screen passed, advanced to regular, tolerating diet     Diet: Regular     RENAL:  good urine output      Na Goal: Greater than 135     Jones:n     HEMATOLOGY: no s/s of bleeding     DVT ppx: Heparin s.c [] LMWH [x]     ID: afebrile, no leukocytosis, dressing dry and intact over ICM site    OTHER:  Stroke NP and  RN spoke with wife about plan of care re: planned discharge on 3/31, she requested to grieve the discharge, risks and benefits discussed, discharge cancelled, 24hr notice of discharge given, all questions answered and concerns addressed. She visited rehab facilities over the weekend, came in Monday am wanting AR.  PMR came an re-evaluated and spoke to her at length.  Discharge today to [facility].    DISPOSITION: rehab pending as per wife's request, she met with Dr. Slade, PMR, for re-evaluation and lengthy discussion re: rehab needs.  Anticipate d/c to [facility] today.    CORE MEASURES:        Admission NIHSS: 6     TPA: [] YES [x] NO      LDL/HDL:127/44     Depression Screen: 0, had discussion with patient yesterday, now depressed but does not want to start medication at this time.     Statin Therapy: y      Dysphagia Screen: [x] PASS [] FAIL     Smoking [] YES [x] NO      Afib [] YES [x] NO     Stroke Education [] YES [] NO -pending ASSESSMENT:   73 Y/O man with vascular risk factors of age and hyperlipidemia was admitted to [facility] for acute onset of aphasia and right hemiparesis. He was a candidate for IV tPA due to time window and not a candidate for mechanical embolectomy in absence of proximal intracranial large vessel occlusion and low NIHSS. MRI brain during hospitalization showed left MCA distribution embolic-looking infarct. CTA head and neck showed mild to moderate (about 50% stenosis to my eye) of extracranial/proximal left ICA without associated symptomatic intracranial stenosis. Transthoracic echocardiogram did not show any obvious structural cardiac source of embolism nor showed any evidence of a PFO.    Impression:  Cerebral embolism with cerebral infarction. Left MCA distribution stroke - likely etiology being cryptogenic stroke, probably related to embolism from a proximal source like cardiac source of embolism    NEURO: Neurologically without acute change, remains with improving expressive aphasia,  normotension, titrate statin to LDL goal less than 70, MR imaging as noted above,  Physical therapy/OT with recommendation [facility]    ANTITHROMBOTIC THERAPY: Aspirin and clopidogrel for 3 weeks followed by aspirin for aggressive secondary stroke prevention    PULMONARY: CXR clear, protecting airway, saturating well     CARDIOVASCULAR: 3/30 TTE-normal LV dimensions and wall thicknesses, normal LV systolic function, indeterminate diastolic function, no PFO; s/p ICM to screen for occult cardiac arrhythmia like atrial fibrillation being the cardiac source of embolism, continue with cardiac monitoring     SBP goal: gradual titration to normotension    GASTROINTESTINAL: dysphagia screen passed, advanced to regular, tolerating diet     Diet: Regular     RENAL: good urine output      Na Goal: Greater than 135     Jones:n     HEMATOLOGY: no si/sx of bleeding     DVT ppx: Heparin s.c [] LMWH [x]     ID: afebrile, no leukocytosis, dressing dry and intact over ICM site    OTHER:  Stroke NP and  RN spoke with wife about plan of care re: planned discharge on 3/31, she requested to grieve the discharge, risks and benefits discussed, discharge cancelled, 24hr notice of discharge given, all questions answered and concerns addressed. She visited rehab facilities over the weekend, came in Monday am wanting AR.  PMR came an re-evaluated and spoke to her at length.  Discharge today to [facility].    DISPOSITION: rehab pending as per wife's request, she met with Dr. Slade, PMR, for re-evaluation and lengthy discussion re: rehab needs.  Anticipate d/c to [facility] today.    CORE MEASURES:        Admission NIHSS: 6     TPA: [] YES [x] NO      LDL/HDL:127/44     Depression Screen: 0, had discussion with patient yesterday, now depressed but does not want to start medication at this time.     Statin Therapy: y      Dysphagia Screen: [x] PASS [] FAIL     Smoking [] YES [x] NO      Afib [] YES [x] NO     Stroke Education [] YES [] NO -pending

## 2018-05-21 ENCOUNTER — APPOINTMENT (OUTPATIENT)
Dept: ELECTROPHYSIOLOGY | Facility: CLINIC | Age: 73
End: 2018-05-21
Payer: MEDICARE

## 2018-05-21 DIAGNOSIS — Z00.00 ENCOUNTER FOR GENERAL ADULT MEDICAL EXAMINATION W/OUT ABNORMAL FINDINGS: ICD-10-CM

## 2018-05-21 PROCEDURE — 93299: CPT

## 2018-05-21 PROCEDURE — 93298 REM INTERROG DEV EVAL SCRMS: CPT

## 2018-06-22 ENCOUNTER — APPOINTMENT (OUTPATIENT)
Dept: ELECTROPHYSIOLOGY | Facility: CLINIC | Age: 73
End: 2018-06-22
Payer: MEDICARE

## 2018-06-22 PROCEDURE — 93298 REM INTERROG DEV EVAL SCRMS: CPT

## 2018-06-22 PROCEDURE — 93299: CPT

## 2018-07-25 ENCOUNTER — APPOINTMENT (OUTPATIENT)
Dept: NEUROLOGY | Facility: CLINIC | Age: 73
End: 2018-07-25

## 2018-07-31 PROBLEM — I10 ESSENTIAL (PRIMARY) HYPERTENSION: Chronic | Status: ACTIVE | Noted: 2018-03-28

## 2018-08-01 PROBLEM — Z00.00 ENCOUNTER FOR PREVENTIVE HEALTH EXAMINATION: Status: ACTIVE | Noted: 2018-06-25

## 2018-08-09 ENCOUNTER — APPOINTMENT (OUTPATIENT)
Dept: NEUROLOGY | Facility: CLINIC | Age: 73
End: 2018-08-09
Payer: MEDICARE

## 2018-08-09 VITALS
SYSTOLIC BLOOD PRESSURE: 134 MMHG | HEART RATE: 75 BPM | TEMPERATURE: 97.8 F | DIASTOLIC BLOOD PRESSURE: 68 MMHG | BODY MASS INDEX: 26.66 KG/M2 | OXYGEN SATURATION: 97 % | WEIGHT: 180 LBS | HEIGHT: 69 IN

## 2018-08-09 DIAGNOSIS — Z82.3 FAMILY HISTORY OF STROKE: ICD-10-CM

## 2018-08-09 PROCEDURE — 99215 OFFICE O/P EST HI 40 MIN: CPT

## 2018-08-13 ENCOUNTER — NON-APPOINTMENT (OUTPATIENT)
Age: 73
End: 2018-08-13

## 2018-08-13 ENCOUNTER — APPOINTMENT (OUTPATIENT)
Dept: ELECTROPHYSIOLOGY | Facility: CLINIC | Age: 73
End: 2018-08-13
Payer: MEDICARE

## 2018-08-13 VITALS
WEIGHT: 181 LBS | BODY MASS INDEX: 26.81 KG/M2 | HEIGHT: 69 IN | DIASTOLIC BLOOD PRESSURE: 73 MMHG | HEART RATE: 66 BPM | OXYGEN SATURATION: 99 % | SYSTOLIC BLOOD PRESSURE: 137 MMHG

## 2018-08-13 PROCEDURE — 93000 ELECTROCARDIOGRAM COMPLETE: CPT

## 2018-08-13 PROCEDURE — 93290 INTERROG DEV EVAL ICPMS IP: CPT

## 2018-08-13 PROCEDURE — 99212 OFFICE O/P EST SF 10 MIN: CPT | Mod: 25

## 2018-08-13 RX ORDER — ESCITALOPRAM OXALATE 10 MG/1
10 TABLET, FILM COATED ORAL
Refills: 0 | Status: ACTIVE | COMMUNITY

## 2018-08-13 RX ORDER — HYDROCHLOROTHIAZIDE 25 MG/1
25 TABLET ORAL DAILY
Qty: 30 | Refills: 0 | Status: ACTIVE | COMMUNITY

## 2018-08-13 RX ORDER — CLOPIDOGREL 75 MG/1
75 TABLET, FILM COATED ORAL DAILY
Qty: 90 | Refills: 1 | Status: ACTIVE | COMMUNITY

## 2018-08-13 RX ORDER — AMLODIPINE BESYLATE 10 MG/1
10 TABLET ORAL
Qty: 30 | Refills: 3 | Status: ACTIVE | COMMUNITY

## 2018-09-17 ENCOUNTER — APPOINTMENT (OUTPATIENT)
Dept: ELECTROPHYSIOLOGY | Facility: CLINIC | Age: 73
End: 2018-09-17

## 2018-10-28 ENCOUNTER — EMERGENCY (EMERGENCY)
Facility: HOSPITAL | Age: 73
LOS: 1 days | Discharge: HOME | End: 2018-10-28
Attending: EMERGENCY MEDICINE
Payer: MEDICARE

## 2018-10-28 VITALS
SYSTOLIC BLOOD PRESSURE: 134 MMHG | TEMPERATURE: 98 F | HEART RATE: 67 BPM | OXYGEN SATURATION: 99 % | DIASTOLIC BLOOD PRESSURE: 74 MMHG | RESPIRATION RATE: 16 BRPM | HEIGHT: 69 IN | WEIGHT: 177.91 LBS

## 2018-10-28 LAB
ALBUMIN SERPL ELPH-MCNC: 3.6 G/DL — SIGNIFICANT CHANGE UP (ref 3.3–5)
ALP SERPL-CCNC: 99 U/L — SIGNIFICANT CHANGE UP (ref 40–120)
ALT FLD-CCNC: 15 U/L — SIGNIFICANT CHANGE UP (ref 10–45)
ANION GAP SERPL CALC-SCNC: 12 MMOL/L — SIGNIFICANT CHANGE UP (ref 5–17)
AST SERPL-CCNC: 19 U/L — SIGNIFICANT CHANGE UP (ref 10–40)
BASE EXCESS BLDV CALC-SCNC: 3.1 MMOL/L — HIGH (ref -2–2)
BILIRUB SERPL-MCNC: 0.7 MG/DL — SIGNIFICANT CHANGE UP (ref 0.2–1.2)
BUN SERPL-MCNC: 18 MG/DL — SIGNIFICANT CHANGE UP (ref 7–23)
CA-I SERPL-SCNC: 1.26 MMOL/L — SIGNIFICANT CHANGE UP (ref 1.12–1.3)
CALCIUM SERPL-MCNC: 10.4 MG/DL — SIGNIFICANT CHANGE UP (ref 8.4–10.5)
CHLORIDE BLDV-SCNC: 114 MMOL/L — HIGH (ref 96–108)
CHLORIDE SERPL-SCNC: 105 MMOL/L — SIGNIFICANT CHANGE UP (ref 96–108)
CO2 BLDV-SCNC: 29 MMOL/L — SIGNIFICANT CHANGE UP (ref 22–30)
CO2 SERPL-SCNC: 24 MMOL/L — SIGNIFICANT CHANGE UP (ref 22–31)
CREAT SERPL-MCNC: 0.65 MG/DL — SIGNIFICANT CHANGE UP (ref 0.5–1.3)
GAS PNL BLDV: 134 MMOL/L — LOW (ref 136–145)
GAS PNL BLDV: SIGNIFICANT CHANGE UP
GAS PNL BLDV: SIGNIFICANT CHANGE UP
GLUCOSE BLDV-MCNC: 94 MG/DL — SIGNIFICANT CHANGE UP (ref 70–99)
GLUCOSE SERPL-MCNC: 96 MG/DL — SIGNIFICANT CHANGE UP (ref 70–99)
HCO3 BLDV-SCNC: 28 MMOL/L — SIGNIFICANT CHANGE UP (ref 21–29)
HCT VFR BLD CALC: 42.6 % — SIGNIFICANT CHANGE UP (ref 39–50)
HCT VFR BLDA CALC: 44 % — SIGNIFICANT CHANGE UP (ref 39–50)
HGB BLD CALC-MCNC: 14.3 G/DL — SIGNIFICANT CHANGE UP (ref 13–17)
HGB BLD-MCNC: 14.5 G/DL — SIGNIFICANT CHANGE UP (ref 13–17)
LACTATE BLDV-MCNC: 2.8 MMOL/L — HIGH (ref 0.7–2)
MCHC RBC-ENTMCNC: 30.1 PG — SIGNIFICANT CHANGE UP (ref 27–34)
MCHC RBC-ENTMCNC: 34 GM/DL — SIGNIFICANT CHANGE UP (ref 32–36)
MCV RBC AUTO: 88.6 FL — SIGNIFICANT CHANGE UP (ref 80–100)
OTHER CELLS CSF MANUAL: 9 ML/DL — LOW (ref 18–22)
PCO2 BLDV: 43 MMHG — SIGNIFICANT CHANGE UP (ref 35–50)
PH BLDV: 7.42 — SIGNIFICANT CHANGE UP (ref 7.35–7.45)
PLATELET # BLD AUTO: 272 K/UL — SIGNIFICANT CHANGE UP (ref 150–400)
PO2 BLDV: 27 MMHG — SIGNIFICANT CHANGE UP (ref 25–45)
POTASSIUM BLDV-SCNC: 4.7 MMOL/L — SIGNIFICANT CHANGE UP (ref 3.5–5.3)
POTASSIUM SERPL-MCNC: 4.4 MMOL/L — SIGNIFICANT CHANGE UP (ref 3.5–5.3)
POTASSIUM SERPL-SCNC: 4.4 MMOL/L — SIGNIFICANT CHANGE UP (ref 3.5–5.3)
PROT SERPL-MCNC: 6.7 G/DL — SIGNIFICANT CHANGE UP (ref 6–8.3)
RBC # BLD: 4.8 M/UL — SIGNIFICANT CHANGE UP (ref 4.2–5.8)
RBC # FLD: 13.1 % — SIGNIFICANT CHANGE UP (ref 10.3–14.5)
SAO2 % BLDV: 46 % — LOW (ref 67–88)
SODIUM SERPL-SCNC: 141 MMOL/L — SIGNIFICANT CHANGE UP (ref 135–145)
WBC # BLD: 6.3 K/UL — SIGNIFICANT CHANGE UP (ref 3.8–10.5)
WBC # FLD AUTO: 6.3 K/UL — SIGNIFICANT CHANGE UP (ref 3.8–10.5)

## 2018-10-28 PROCEDURE — 70549 MR ANGIOGRAPH NECK W/O&W/DYE: CPT | Mod: 26

## 2018-10-28 PROCEDURE — 93010 ELECTROCARDIOGRAM REPORT: CPT

## 2018-10-28 PROCEDURE — 99218: CPT | Mod: GC

## 2018-10-28 PROCEDURE — 70450 CT HEAD/BRAIN W/O DYE: CPT | Mod: 26

## 2018-10-28 PROCEDURE — 70551 MRI BRAIN STEM W/O DYE: CPT | Mod: 26

## 2018-10-28 PROCEDURE — 72125 CT NECK SPINE W/O DYE: CPT | Mod: 26

## 2018-10-28 RX ORDER — MECLIZINE HCL 12.5 MG
25 TABLET ORAL ONCE
Qty: 0 | Refills: 0 | Status: COMPLETED | OUTPATIENT
Start: 2018-10-28 | End: 2018-10-28

## 2018-10-28 RX ORDER — SODIUM CHLORIDE 9 MG/ML
1000 INJECTION INTRAMUSCULAR; INTRAVENOUS; SUBCUTANEOUS
Qty: 0 | Refills: 0 | Status: DISCONTINUED | OUTPATIENT
Start: 2018-10-28 | End: 2018-11-01

## 2018-10-28 RX ORDER — SODIUM CHLORIDE 9 MG/ML
3 INJECTION INTRAMUSCULAR; INTRAVENOUS; SUBCUTANEOUS EVERY 8 HOURS
Qty: 0 | Refills: 0 | Status: DISCONTINUED | OUTPATIENT
Start: 2018-10-28 | End: 2018-11-01

## 2018-10-28 RX ORDER — SODIUM CHLORIDE 9 MG/ML
1000 INJECTION INTRAMUSCULAR; INTRAVENOUS; SUBCUTANEOUS ONCE
Qty: 0 | Refills: 0 | Status: COMPLETED | OUTPATIENT
Start: 2018-10-28 | End: 2018-10-28

## 2018-10-28 RX ADMIN — SODIUM CHLORIDE 100 MILLILITER(S): 9 INJECTION INTRAMUSCULAR; INTRAVENOUS; SUBCUTANEOUS at 19:43

## 2018-10-28 RX ADMIN — SODIUM CHLORIDE 500 MILLILITER(S): 9 INJECTION INTRAMUSCULAR; INTRAVENOUS; SUBCUTANEOUS at 11:36

## 2018-10-28 RX ADMIN — Medication 25 MILLIGRAM(S): at 11:52

## 2018-10-28 RX ADMIN — SODIUM CHLORIDE 100 MILLILITER(S): 9 INJECTION INTRAMUSCULAR; INTRAVENOUS; SUBCUTANEOUS at 09:25

## 2018-10-28 RX ADMIN — SODIUM CHLORIDE 3 MILLILITER(S): 9 INJECTION INTRAMUSCULAR; INTRAVENOUS; SUBCUTANEOUS at 21:10

## 2018-10-28 NOTE — CONSULT NOTE ADULT - SUBJECTIVE AND OBJECTIVE BOX
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 Patient states he went to bed last night around 1am feeling normal and woke up this morning feeling dizzy. Patient describes the dizziness as "feeling off balance" and "shaky" but does endorse room spinning sensation. Patient states he noticed the dizziness when trying to walk after getting up and subsequently banged into the wall. Endorses hitting his head. At time of symptom onset, patient denies any loss of consciousness, changes in vision, changes in speech, unilateral weakness, problems with coordination. bowel or bladder incontinence or sudden onset of headache.   Currently, patient states he no longer feels dizzy.     MEDICATIONS  (STANDING):  sodium chloride 0.9%. 1000 milliLiter(s) (100 mL/Hr) IV Continuous <Continuous>    MEDICATIONS  (PRN):    PAST MEDICAL & SURGICAL HISTORY:  TBI (traumatic brain injury)  Anxiety  Hypertension  High cholesterol    FAMILY HISTORY:    Allergies    acetaminophen containing compounds (Hives)  shellfish (Unknown)    Intolerances        SHx - No smoking, No ETOH, No drug abuse      Review of Systems:  As per HPI    Vital Signs Last 24 Hrs  T(C): 36.6 (28 Oct 2018 08:33), Max: 36.6 (28 Oct 2018 08:33)  T(F): 97.8 (28 Oct 2018 08:33), Max: 97.8 (28 Oct 2018 08:33)  HR: 67 (28 Oct 2018 08:33) (67 - 67)  BP: 134/74 (28 Oct 2018 08:33) (134/74 - 134/74)  BP(mean): --  RR: 16 (28 Oct 2018 08:33) (16 - 16)  SpO2: 99% (28 Oct 2018 08:33) (99% - 99%)    General Exam:   General appearance: No acute distress               Neurological Exam:  Mental Status: Orientated to self, date and place.  Attention intact.  No dysarthria. Speech fluent. Repetition and naming intact  Cranial Nerves:   PERRL, EOMI, VFF, +Left beating horizontal nystagmus on left gaze. CN V1-3 intact to light touch.  No facial asymmetry.  Hearing intact to finger rub bilaterally.  Tongue, uvula and palate midline.  Sternocleidomastoid and Trapezius intact bilaterally.    Motor:   Tone: normal.                  Strength:     [] Upper extremity                      Delt       Bicep    Tricep                                                  R         5/5        5/5        5/5       5/5                                               L          5/5        5/5        5/5       5/5  [] Lower extremity                       HF          KE          KF        DF         PF                                               R        5/5 5/5 5/5 5/5       5/5                                               L         5/5 5/5       5/5       5/5        5/5  Pronator drift: None                 Dysmetria: +Subtle dysmetria in Left FNF. HTS normal bilaterally.   Tremor: No resting, postural or action tremor.  No myoclonus.    Sensation: intact to light touch, pinprick, vibration and proprioception    Deep Tendon Reflexes:     Biceps          Triceps      BR        Patellar        Ankle         Babinski                                         R       2+                   2+           2+            2+               0           downgoing                                         L        2+                  2+           2+            3+               2+           downgoing    Gait: Normal.      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 Patient states he went to bed last night around 1am feeling normal and woke up this morning feeling dizzy. Patient describes the dizziness as "feeling off balance" and "shaky" but does endorse room spinning sensation. Patient states he noticed the dizziness when trying to walk after getting up and subsequently banged into the wall. Endorses hitting his head. At time of symptom onset, patient denies any loss of consciousness, changes in vision, changes in speech, unilateral weakness, problems with coordination. bowel or bladder incontinence or sudden onset of headache. Patient also denies any nausea or vomiting.   Currently, patient states he no longer feels dizzy.     MEDICATIONS  (STANDING):  sodium chloride 0.9%. 1000 milliLiter(s) (100 mL/Hr) IV Continuous <Continuous>    MEDICATIONS  (PRN):    PAST MEDICAL & SURGICAL HISTORY:  TBI (traumatic brain injury)  Anxiety  Hypertension  High cholesterol    FAMILY HISTORY:    Allergies    acetaminophen containing compounds (Hives)  shellfish (Unknown)    Intolerances        SHx - No smoking, No ETOH, No drug abuse      Review of Systems:  As per HPI    Vital Signs Last 24 Hrs  T(C): 36.6 (28 Oct 2018 08:33), Max: 36.6 (28 Oct 2018 08:33)  T(F): 97.8 (28 Oct 2018 08:33), Max: 97.8 (28 Oct 2018 08:33)  HR: 67 (28 Oct 2018 08:33) (67 - 67)  BP: 134/74 (28 Oct 2018 08:33) (134/74 - 134/74)  BP(mean): --  RR: 16 (28 Oct 2018 08:33) (16 - 16)  SpO2: 99% (28 Oct 2018 08:33) (99% - 99%)    General Exam:   General appearance: No acute distress               Neurological Exam:  Mental Status: Orientated to self, date and place.  Attention intact.  No dysarthria. Speech fluent. Repetition and naming intact  Cranial Nerves:   PERRL, EOMI, VFF, +Left beating horizontal nystagmus on left gaze. CN V1-3 intact to light touch.  No facial asymmetry.  Hearing intact to finger rub bilaterally.  Tongue, uvula and palate midline.  Sternocleidomastoid and Trapezius intact bilaterally.    Motor:   Tone: normal.                  Strength:     [] Upper extremity                      Delt       Bicep    Tricep                                                  R         5/5 5/5 5/5       5/5                                               L          5/5 5/5 5/5 5/5  [] Lower extremity                       HF          KE          KF        DF         PF                                               R        5/5 5/5 5/5 5/5 5/5                                               L         5/5 5/5 5/5 5/5        5/5  Pronator drift: None                 Dysmetria: +Subtle dysmetria in Left FNF. HTS normal bilaterally.   Tremor: No resting, postural or action tremor.  No myoclonus.    Sensation: intact to light touch, pinprick, vibration and proprioception    Deep Tendon Reflexes:     Biceps          Triceps      BR        Patellar        Ankle         Babinski                                         R       2+                   2+           2+            2+               0           downgoing                                         L        2+                  2+           2+            3+               2+           downgoing    Gait: Normal.      Other:                Radiology    CT:   MRI  EKG:  tele:  TTE:  EEG:

## 2018-10-28 NOTE — ED ADULT NURSE NOTE - OBJECTIVE STATEMENT
pT WITH HX: stroke came in with dizziness and almost fainted per wife. No distress. Breathing easy and non labored. Ambulatory. Speech clear. Nom weakness noted. no facial droop.

## 2018-10-28 NOTE — ED ADULT NURSE NOTE - CHIEF COMPLAINT QUOTE
"When I got up out of bed, I started to bang into walls and was off balance". Unsure if he hit his head. Got himself up.  I had a stroke in March. No issues speaking. Speaking in coherent sentences. On Plavix. Denies weakness. C/o Dizzy. Last known normal was last night before bed. Equal strength/motor/sensory all 4 extremities. Facial symmetry noted.

## 2018-10-28 NOTE — ED ADULT TRIAGE NOTE - CHIEF COMPLAINT QUOTE
"When I got up out of bed, I started to bang into walls and was off balance". Unsure if he hit his head. Got himself up.  I had a stroke in March. No issues speaking. Speaking in coherent sentences. On Plavix. Denies weakness. C/o Dizzy. Last known normal was last night before bed. "When I got up out of bed, I started to bang into walls and was off balance". Unsure if he hit his head. Got himself up.  I had a stroke in March. No issues speaking. Speaking in coherent sentences. On Plavix. Denies weakness. C/o Dizzy. Last known normal was last night before bed. Equal strength/motor/sensory all 4 extremities. Facial symmetry noted.

## 2018-10-28 NOTE — ED CDU PROVIDER INITIAL DAY NOTE - OBJECTIVE STATEMENT
73y male pmhx CVA 3/2018 (on Plavix), residual intermittent aphasia, HTN, HLD, anxiety, prior TBI, presented to the ED for dizziness and feeling off balance. Reports that he felt room was spinning around and felt unsteady on his feet when trying to walk, subsequently hitting his head against the wall secondary to being unbalanced. Denies any loss of consciousness. Currently patient reports that his symptoms are resolved.   In ED patient had labs which were within normal limits and CT head and cervical spine negative for any acute findings. Patient was seen by neurology recommending CDU for neuro checks and MRI/MRA. EP called by ED for patient to have loop recorder interrogated prior to MRI.   Private Physician Fahad chan. Mathew Coleman PCP

## 2018-10-28 NOTE — ED PROVIDER NOTE - MEDICAL DECISION MAKING DETAILS
Pt with hx of cva pw positional vertigo. ro cva. Check CT, labs neuro consult  Arpit Morejon MD, Facep

## 2018-10-28 NOTE — ED CDU PROVIDER DISPOSITION NOTE - PLAN OF CARE
(1) You will need to follow-up with your PMD in the next 2-3 days. A copy of your results were given to you to bring to your appointment.  (2) Read attached discharge paperwork.  (3) Continue your home medications as directed.  (4) Drink plenty of fluids to stay hydrated.  (5) Return to ER for lightheaded/dizziness, chest pain, trouble breathing, palpitations, or any other concerns. (1) You will need to follow-up with your PMD in the next 2-3 days. A copy of your results were given to you to bring to your appointment. Please also follow up with your Neurologist, Dr. López, upon discharge.   (2) Read attached discharge paperwork.  (3) Continue your home medications as directed including Plavix.   (4) Drink plenty of fluids to stay hydrated.  (5) Return to ER for any lightheaded/dizziness, chest pain, trouble breathing, palpitations, vision changes, numbness/tingling, weakness, or any other concerns.

## 2018-10-28 NOTE — CONSULT NOTE ADULT - ASSESSMENT
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 LKN 1AM (10/28/2018). Currently asymptomatic. Neurologic exam remarkable for subtle left dysmetria on FNF and left beating nystagmus on left gaze otherwise, non-focal. NIHSS 1. Pre-MRS 0. tPA not given as patient out of the window. CT head pending. Not an endovascular candidate due to low NIHSS.     Impression: Vertigo 2/2 peripheral versus central etiology; r/o acute stroke possibly involving the cerebellum 2/2 unknown mechanism.     Recommendations:  [] Observation unit  [] IVF hydration  [] Bedside swallow evaluation  [] Trial of Meclizine  [] CT head w/o contrast  [] If CT head w/o contrast demonstrates no acute bleed, can start on Aspirin 81mg PO QD in addition to patient's home Plavix for 3 weeks.   [] MRI brain w/o contrast  [] MRA head w/o contrast  [] MRA neck w/ contrast      Plan discussed with Stroke Fellow

## 2018-10-28 NOTE — ED CDU PROVIDER INITIAL DAY NOTE - PROGRESS NOTE DETAILS
CDU PROGRESS NOTE REMIGIO FLORES: Pt resting comfortably, feeling well without complaint. NAD, VSS. No events on telemetry. CDU PROGRESS NOTE REMIGIO ARCE: Received pt at 1900 sign-out. Pt resting in stretcher in NAD. Case/plan reviewed. Patient aox3, speaking clear coherent sentences Ambulatory around unit with steady gait no neuro deficit. S1 S2 noted, RRR, lungs CTA b/l, BS x4 with soft, nontender abdomen. Pt without complaints. Will continue to monitor overnight. Pending official read on MRI CDU PROGRESS NOTE PA YAZMIN: No interval change from previous note. Pt resting comfortably, speaking clear coherent sentences, aox3, feeling well without complaint. NAD, VSS, No neuro deficit. No events on telemetry.

## 2018-10-28 NOTE — ED ADULT NURSE NOTE - ED STAT RN HANDOFF DETAILS
CDU called. Ondina stated that she was reading about the pt. Informed her that the first bolus is in progress

## 2018-10-28 NOTE — ED CDU PROVIDER INITIAL DAY NOTE - ATTENDING CONTRIBUTION TO CARE
I have personally performed a face to face diagnostic evaluation on this patient.  I have reviewed the ACP note and agree with the history, exam, and plan of care, except as noted.  History and Exam by me shows  See ED provider note  Arpit Morejon MD, Facep

## 2018-10-28 NOTE — ED CDU PROVIDER INITIAL DAY NOTE - DETAILS
74 y/o M pmhx CVA presenting with episode of dizziness and ataxia   - continuous monitoring and frequent reevaluations   - telemetry monitoring   - neuro checks Q4H  - neurology following   - MRI/MRA head and neck   - d/w Dr. Morejon

## 2018-10-28 NOTE — CHART NOTE - NSCHARTNOTEFT_GEN_A_CORE
ELECTROPHYSIOLOGY  Device Interrogation Performed                                  Date/Time:  :      [employer]ner                   Model:     ILR        Battery Status:   Good                    Underlying Rhythm:    sinus rhythm    Events/Observation:    Three episodes of sinus tachycardia.   - 207.  The 207 episode, also w/ evidence of noise, thus real HR was 111 BPM (cycle length 540).      No further interventions.

## 2018-10-28 NOTE — ED CDU PROVIDER DISPOSITION NOTE - ATTENDING CONTRIBUTION TO CARE
Attending MD Barillas:   I personally have seen and examined this patient.  Physician assistant note reviewed and agree on plan of care and except where noted.  See HPI, PE, and MDM for details.

## 2018-10-28 NOTE — ED CDU PROVIDER DISPOSITION NOTE - CLINICAL COURSE
Private Physician Fahad López neuro. [facility] PCP  73y male pmhx CVA 3/2018 (on Plavix), residual intermittent aphasia, HTN, HLD, anxiety, prior TBI, presented to the ED for dizziness and feeling off balance. Reports that he felt room was spinning around and felt unsteady on his feet when trying to walk, subsequently hitting his head against the wall secondary to being unbalanced. Denies any loss of consciousness. Currently patient reports that his symptoms are resolved.   In ED patient had labs which were within normal limits and CT head and cervical spine negative for any acute findings. Patient was seen by neurology recommending CDU for neuro checks and MRI/MRA. EP called by ED for patient to have loop recorder interrogated prior to MRI. ILR interrogation showed Three episodes of sinus tachycardia.   - 207.  The 207 episode, also w/ evidence of noise, thus real HR was 111 BPM (cycle length 540). Patient had MRI/MRA head and neck showing..... Private Physician Fahad López neuro. [facility] PCP  73y male pmhx CVA 3/2018 (on Plavix), residual intermittent aphasia, HTN, HLD, anxiety, prior TBI, presented to the ED for dizziness and feeling off balance. Reports that he felt room was spinning around and felt unsteady on his feet when trying to walk, subsequently hitting his head against the wall secondary to being unbalanced. Denies any loss of consciousness. Currently patient reports that his symptoms are resolved.   In ED patient had labs which were within normal limits and CT head and cervical spine negative for any acute findings. Patient was seen by neurology recommending CDU for neuro checks and MRI/MRA. EP called by ED for patient to have loop recorder interrogated prior to MRI. ILR interrogation showed Three episodes of sinus tachycardia.   - 207.  The 207 episode, also w/ evidence of noise, thus real HR was 111 BPM (cycle length 540). Patient had MRI/MRA head and neck with no acute changes. Patient with resolution of dizziness and no return of symptoms while in CDU. No events on telemetry. Patients stable for d/c home to f/u with Neurologist.

## 2018-10-28 NOTE — ED PROVIDER NOTE - OBJECTIVE STATEMENT
Private Physician Fahad López neuro. [facility] PCP  73y male pmh cva 4/2018, HTN,HLD, No dm,cad,cancer,travel, Pt comes to ed complains of awoke this am and getting out of bed had ataxia, feels off balance. No fall had to hold onto wall to walk, PT last well when went to bed at 1am. No fever,chills. has mild neck pain. no cp.sob, Has sensation of movement/room spinning. worse with lying flat

## 2018-10-28 NOTE — ED CDU PROVIDER INITIAL DAY NOTE - MEDICAL DECISION MAKING DETAILS
Dizzy w ataxia ro cva check mri/mri head/neck, neuro checks final neuro consult  Arpit Morejon MD, Facep

## 2018-10-28 NOTE — ED PROVIDER NOTE - PROGRESS NOTE DETAILS
Owen: Neurology consulted. Recommend meclizine for dizziness/vertigo. Awaiting results of CTH. If negative can add ASA 81. Recommend CDU for observation, MRI head/neck. Discussed with attending MD Morejon. Owen: EP to see patient since he has a loop recorder, must be interrogated prior to MRI. Pt accepted to CDU for further monitoring. Overall, dizziness improved. Pt able to walk around hallway without issue.

## 2018-10-28 NOTE — ED ADULT NURSE REASSESSMENT NOTE - NS ED NURSE REASSESS COMMENT FT1
Pt report received from APPLE CARRERO. Pt transferred to cdu bed 1 for r/o stroke (mri/MRA). Pt a/ox3 denies respiratory distress, sob, dyspnea, C/P, palpitations, n/v/d at this time. Pt states symptoms have improved, currently awaiting MRI/MRA at this time. VSS, afebrile, IV clean/dry/intact. Pt aware of plan of care, call bell within reach ,safety maintained. Will monitor and assess.
Received pt from ALISE Nj, received pt alert and responsive, oriented x4, denies any respiratory distress, SOB, or difficulty breathing. Pt transferred to CDU for dizziness, pt currently stating dizziness, unsteadiness has resolved, no neuro deficits noted at this time. MRI completed, pending results. Pt is aware. IV in place, patent and free of signs of infiltration, on continuous cardiac monitoring as ordered, SR hr: 61.  pt denies chest pain or palpitations, V/S stable, pt afebrile, pt denies pain at this time. Pt educated on unit and unit rules, instructed patient to notify RN of any needed assistance, Pt verbalizes understanding, Call bell placed within reach. Safety maintained. Will continue to monitor.

## 2018-10-28 NOTE — ED ADULT NURSE REASSESSMENT NOTE - COMFORT CARE
ambulated to bathroom/plan of care explained/warm blanket provided/repositioned/darkened lights/po fluids offered

## 2018-10-28 NOTE — ED ADULT NURSE NOTE - NSIMPLEMENTINTERV_GEN_ALL_ED
Implemented All Universal Safety Interventions:  [place] to call system. Call bell, personal items and telephone within reach. Instruct patient to call for assistance. Room bathroom lighting operational. Non-slip footwear when patient is off stretcher. Physically safe environment: no spills, clutter or unnecessary equipment. Stretcher in lowest position, wheels locked, appropriate side rails in place.

## 2018-10-29 VITALS
SYSTOLIC BLOOD PRESSURE: 132 MMHG | TEMPERATURE: 98 F | OXYGEN SATURATION: 98 % | RESPIRATION RATE: 18 BRPM | DIASTOLIC BLOOD PRESSURE: 69 MMHG | HEART RATE: 63 BPM

## 2018-10-29 LAB
APPEARANCE UR: CLEAR — SIGNIFICANT CHANGE UP
BILIRUB UR-MCNC: NEGATIVE — SIGNIFICANT CHANGE UP
COLOR SPEC: SIGNIFICANT CHANGE UP
DIFF PNL FLD: NEGATIVE — SIGNIFICANT CHANGE UP
GLUCOSE UR QL: NEGATIVE — SIGNIFICANT CHANGE UP
KETONES UR-MCNC: NEGATIVE — SIGNIFICANT CHANGE UP
LEUKOCYTE ESTERASE UR-ACNC: NEGATIVE — SIGNIFICANT CHANGE UP
NITRITE UR-MCNC: NEGATIVE — SIGNIFICANT CHANGE UP
PH UR: 7 — SIGNIFICANT CHANGE UP (ref 5–8)
PROT UR-MCNC: NEGATIVE — SIGNIFICANT CHANGE UP
SP GR SPEC: 1.02 — SIGNIFICANT CHANGE UP (ref 1.01–1.02)
UROBILINOGEN FLD QL: NEGATIVE — SIGNIFICANT CHANGE UP

## 2018-10-29 PROCEDURE — 70450 CT HEAD/BRAIN W/O DYE: CPT

## 2018-10-29 PROCEDURE — 82435 ASSAY OF BLOOD CHLORIDE: CPT

## 2018-10-29 PROCEDURE — 93005 ELECTROCARDIOGRAM TRACING: CPT

## 2018-10-29 PROCEDURE — 70544 MR ANGIOGRAPHY HEAD W/O DYE: CPT

## 2018-10-29 PROCEDURE — 80053 COMPREHEN METABOLIC PANEL: CPT

## 2018-10-29 PROCEDURE — 70549 MR ANGIOGRAPH NECK W/O&W/DYE: CPT

## 2018-10-29 PROCEDURE — 85014 HEMATOCRIT: CPT

## 2018-10-29 PROCEDURE — 82803 BLOOD GASES ANY COMBINATION: CPT

## 2018-10-29 PROCEDURE — 85027 COMPLETE CBC AUTOMATED: CPT

## 2018-10-29 PROCEDURE — 99217: CPT

## 2018-10-29 PROCEDURE — 83605 ASSAY OF LACTIC ACID: CPT

## 2018-10-29 PROCEDURE — 82947 ASSAY GLUCOSE BLOOD QUANT: CPT

## 2018-10-29 PROCEDURE — 81003 URINALYSIS AUTO W/O SCOPE: CPT

## 2018-10-29 PROCEDURE — 82330 ASSAY OF CALCIUM: CPT

## 2018-10-29 PROCEDURE — 84132 ASSAY OF SERUM POTASSIUM: CPT

## 2018-10-29 PROCEDURE — A9585: CPT

## 2018-10-29 PROCEDURE — G0378: CPT

## 2018-10-29 PROCEDURE — 70551 MRI BRAIN STEM W/O DYE: CPT

## 2018-10-29 PROCEDURE — 72125 CT NECK SPINE W/O DYE: CPT

## 2018-10-29 PROCEDURE — 99284 EMERGENCY DEPT VISIT MOD MDM: CPT | Mod: 25

## 2018-10-29 PROCEDURE — 84295 ASSAY OF SERUM SODIUM: CPT

## 2018-10-29 RX ADMIN — SODIUM CHLORIDE 3 MILLILITER(S): 9 INJECTION INTRAMUSCULAR; INTRAVENOUS; SUBCUTANEOUS at 05:05

## 2018-10-29 RX ADMIN — SODIUM CHLORIDE 100 MILLILITER(S): 9 INJECTION INTRAMUSCULAR; INTRAVENOUS; SUBCUTANEOUS at 04:19
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
## 2018-10-29 NOTE — ED CDU PROVIDER SUBSEQUENT DAY NOTE - HISTORY
CDU PROGRESS NOTE REMIGIO ARCE: Pt is aox3, speaking clear coherent sentences, no neuro deficit resting comfortably, feeling well without complaint. NAD, VSS. No events on telemetry.

## 2018-11-13 ENCOUNTER — APPOINTMENT (OUTPATIENT)
Dept: NEUROLOGY | Facility: CLINIC | Age: 73
End: 2018-11-13
Payer: MEDICARE

## 2018-11-13 VITALS
HEIGHT: 69 IN | SYSTOLIC BLOOD PRESSURE: 137 MMHG | HEART RATE: 67 BPM | TEMPERATURE: 98 F | DIASTOLIC BLOOD PRESSURE: 74 MMHG | WEIGHT: 180 LBS | BODY MASS INDEX: 26.66 KG/M2 | OXYGEN SATURATION: 98 %

## 2018-11-13 PROCEDURE — 99215 OFFICE O/P EST HI 40 MIN: CPT

## 2018-11-19 ENCOUNTER — APPOINTMENT (OUTPATIENT)
Dept: ELECTROPHYSIOLOGY | Facility: CLINIC | Age: 73
End: 2018-11-19

## 2018-12-03 ENCOUNTER — APPOINTMENT (OUTPATIENT)
Dept: CARDIOLOGY | Facility: CLINIC | Age: 73
End: 2018-12-03
Payer: MEDICARE

## 2018-12-03 ENCOUNTER — NON-APPOINTMENT (OUTPATIENT)
Age: 73
End: 2018-12-03

## 2018-12-03 VITALS
BODY MASS INDEX: 26.22 KG/M2 | WEIGHT: 177 LBS | HEART RATE: 73 BPM | HEIGHT: 69 IN | DIASTOLIC BLOOD PRESSURE: 75 MMHG | OXYGEN SATURATION: 97 % | SYSTOLIC BLOOD PRESSURE: 122 MMHG

## 2018-12-03 PROCEDURE — 93290 INTERROG DEV EVAL ICPMS IP: CPT

## 2018-12-03 PROCEDURE — 99214 OFFICE O/P EST MOD 30 MIN: CPT | Mod: 25

## 2018-12-03 PROCEDURE — 93000 ELECTROCARDIOGRAM COMPLETE: CPT

## 2018-12-06 NOTE — DISCUSSION/SUMMARY
[FreeTextEntry1] : In summary the patient is delightful 73-year-old gentleman with a history of cryptogenic stroke having recurrent episodes of what appeared to be an SVT. There is clear atrial activity and these are regular and therefore do not represent atrial fibrillation. We discussed the treatment options at this point and we are going to try and start him on metoprolol long-acting 25 mg a day to see how he does. We also did discuss the possibility of ablation in the future.

## 2018-12-06 NOTE — HISTORY OF PRESENT ILLNESS
[FreeTextEntry1] : I had the pleasure of seeing your patient Arpit De León today in the arrhythmia clinic of [facility]. As you well know the patient is a delightful 73-year-old gentleman with a history of hypertension hyperlipidemia and a recent cerebral embolism. The patient presented to the hospital in March of this year with neurologic symptoms including a fascia. He was found to have a left MCA territory cerebral embolism. He has made a good recovery with only residual defects. He was discharged on Plavix and aspirin and recently the aspirin has been discontinued. An echocardiogram demonstrated normal left ventricular size and function with an EF of 65% and left atrium of 3.6 cm. He underwent implantation of an implantable loop recorder. In follow up he did have 2 brief episodes of an atrial tachycardia (not atrial fibrillation). He returns to clinic today for followup.\par \par Overall he is doing well. His blood pressure today was 122/75 and his pulse was 73 and regular. His EKG revealed sinus rhythm at 72 beats per minute with a possible inferior MI. Interrogation of his implantable loop recorder demonstrates that he did have episodes of SVT off and on on November 6. On questioning the patient reports that day that he felt agitated and maybe some mild chest pressure but he denied any lightheadedness, presyncope or syncope. In retrospect the patient has had this on and off very infrequently for the past one to 2 years.\par \par \par

## 2018-12-20 ENCOUNTER — APPOINTMENT (OUTPATIENT)
Dept: ELECTROPHYSIOLOGY | Facility: CLINIC | Age: 73
End: 2018-12-20

## 2019-03-01 ENCOUNTER — MEDICATION RENEWAL (OUTPATIENT)
Age: 74
End: 2019-03-01

## 2019-03-11 ENCOUNTER — NON-APPOINTMENT (OUTPATIENT)
Age: 74
End: 2019-03-11

## 2019-03-11 ENCOUNTER — APPOINTMENT (OUTPATIENT)
Dept: ELECTROPHYSIOLOGY | Facility: CLINIC | Age: 74
End: 2019-03-11
Payer: MEDICARE

## 2019-03-11 VITALS
HEIGHT: 69 IN | HEART RATE: 64 BPM | BODY MASS INDEX: 26.66 KG/M2 | SYSTOLIC BLOOD PRESSURE: 135 MMHG | WEIGHT: 180 LBS | OXYGEN SATURATION: 98 % | DIASTOLIC BLOOD PRESSURE: 77 MMHG

## 2019-03-11 PROCEDURE — 93290 INTERROG DEV EVAL ICPMS IP: CPT

## 2019-03-11 PROCEDURE — 99213 OFFICE O/P EST LOW 20 MIN: CPT | Mod: 25

## 2019-03-11 PROCEDURE — 93000 ELECTROCARDIOGRAM COMPLETE: CPT

## 2019-03-11 NOTE — DISCUSSION/SUMMARY
[FreeTextEntry1] : In summary the patient is a 73-year-old gentleman with a cryptogenic stroke and an atrial tachycardia. He is not having episodes of atrial fibrillation and his arrhythmias are at most minimally symptomatic. For now he is going to continue on the [place] but will let us know if he has any other symptoms. We reeducated him as to how he should use his triggering device and if he has his indigestion again he will send a recording to see if it represents atrial tachycardia.

## 2019-03-11 NOTE — HISTORY OF PRESENT ILLNESS
[FreeTextEntry1] : I had the pleasure of seeing your patient Arpit De León today in the arrhythmia clinic of [facility]. As you well know the patient is a delightful 73-year-old gentleman with a history of hypertension hyperlipidemia and a recent cerebral embolism. The patient presented to the hospital in March of this year with neurologic symptoms including a fascia. He was found to have a left MCA territory cerebral embolism. He has made a good recovery with only residual defects. He was discharged on Plavix and aspirin and recently the aspirin has been discontinued. An echocardiogram demonstrated normal left ventricular size and function with an EF of 65% and left atrium of 3.6 cm. He underwent implantation of an implantable loop recorder. In follow up he did have 2 brief episodes of an atrial tachycardia (not atrial fibrillation). When I last saw him we began metoprolol 25mg. He returns to clinic today for followup.\par \par Overall he is doing well. When I asked him if he had any palpitations, lightheadedness, presyncope or syncope he said no. However occasionally at night he gets what he describes as agida. His blood pressure today was 135/77 his pulse was 64 and regular. 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 Interrogation of his clinical loop recorder reveals he has had 2 episodes of SVT again. One on February 13 and one on February 26. They were very regular with the first one being at around 194 beats per minute and the second 171 beats per minute. They do not look like atrial fibrillation or atrial flutter. A stop and start suddenly and are consistent with an atrial tachycardia.\par \par \par \par \par \par

## 2019-04-09 ENCOUNTER — APPOINTMENT (OUTPATIENT)
Dept: NEUROLOGY | Facility: CLINIC | Age: 74
End: 2019-04-09
Payer: MEDICARE

## 2019-04-09 VITALS
HEART RATE: 64 BPM | HEIGHT: 69 IN | SYSTOLIC BLOOD PRESSURE: 132 MMHG | WEIGHT: 180 LBS | DIASTOLIC BLOOD PRESSURE: 64 MMHG | BODY MASS INDEX: 26.66 KG/M2 | OXYGEN SATURATION: 97 %

## 2019-04-09 PROCEDURE — 99215 OFFICE O/P EST HI 40 MIN: CPT

## 2019-04-09 NOTE — HISTORY OF PRESENT ILLNESS
[FreeTextEntry1] : Mr. De León is a 73 year old man with pmh of HTN, HLD and presents today for a follow up appointment for cerebral embolism with infarction, left MCA territory, with unknown etiology on March 28 2018. An ILR was placed in the hospital with episodes of SVT, but no A.fib findings. He is being followed by Dr. Day. Patient reports having mild residual aphasia for which he goes to [facility] for outpatient speech therapy. Patient has no residual weakness from the stroke and denies any interval TIA/stroke symptoms.

## 2019-04-09 NOTE — DISCUSSION/SUMMARY
[Goals and Counseling] : I have reviewed the goals of stroke risk factor modification. I counseled the patient on measures to reduce stroke risk, including the importance of medication compliance, risk factor control, exercise, healthy diet and avoidance of smoking. I reviewed stroke warning signs and symptoms and appropriate actions to take if such occur. [FreeTextEntry1] : Mr. De León is a 73 year old man now 1 year status post a cryptogenic left MCA territory ischemic stroke while taking ASA for primary prevention. He is currently on daily Plavix and Lipitor 40 mg for stroke prevention. He will continue speech therapy and has no other rehab needs.  All questions were addressed and he will follow-up in 1 year

## 2019-04-09 NOTE — PHYSICAL EXAM
[General Appearance - Alert] : alert [General Appearance - Well Developed] : well developed [General Appearance - Well Nourished] : well nourished [Oriented To Time, Place, And Person] : oriented to person, place, and time [Affect] : the affect was normal [Mood] : the mood was normal [Person] : oriented to person [Place] : oriented to place [Time] : oriented to time [Concentration Intact] : normal concentrating ability [Visual Intact] : visual attention was ~T not ~L decreased [Naming Objects] : no difficulty naming common objects [Repeating Phrases] : no difficulty repeating a phrase [Writing A Sentence] : no difficulty writing a sentence [Fluency] : fluency intact [Reading] : reading intact [Comprehension] : comprehension intact [Past History] : adequate knowledge of personal past history [Cranial Nerves Optic (II)] : visual acuity intact bilaterally,  visual fields full to confrontation, pupils equal round and reactive to light [Cranial Nerves Oculomotor (III)] : extraocular motion intact [Cranial Nerves Trigeminal (V)] : facial sensation intact symmetrically [Cranial Nerves Vestibulocochlear (VIII)] : hearing was intact bilaterally [Cranial Nerves Facial (VII)] : face symmetrical [Cranial Nerves Glossopharyngeal (IX)] : tongue and palate midline [Cranial Nerves Accessory (XI - Cranial And Spinal)] : head turning and shoulder shrug symmetric [Cranial Nerves Hypoglossal (XII)] : there was no tongue deviation with protrusion [Motor Tone] : muscle tone was normal in all four extremities [Motor Strength] : muscle strength was normal in all four extremities [No Muscle Atrophy] : normal bulk in all four extremities [Motor Handedness Right-Handed] : the patient is right hand dominant [Sensation Tactile Decrease] : light touch was intact [Balance] : balance was intact [Extraocular Movements] : extraocular movements were intact [Optic Disc Abnormality] : the optic disc were normal in size and color [Full Visual Field] : full visual field [Normal] : normal [Hearing Threshold Finger Rub Not [place]] : hearing was normal [Neck Appearance] : the appearance of the neck was normal [] : no respiratory distress [Heart Rate And Rhythm] : heart rate was normal and rhythm regular [Edema] : there was no peripheral edema [Abdomen Soft] : soft [Abnormal Walk] : normal gait [Involuntary Movements] : no involuntary movements were seen [Musculoskeletal - Swelling] : no joint swelling seen [Skin Color & Pigmentation] : normal skin color and pigmentation [Skin Turgor] : normal skin turgor [Short Term Intact] : short term memory intact [Remote Intact] : remote memory intact [Paresis Pronator Drift Right-Sided] : no pronator drift on the right [Paresis Pronator Drift Left-Sided] : no pronator drift on the left [Motor Strength Upper Extremities Bilaterally] : strength was normal in both upper extremities [Motor Strength Lower Extremities Bilaterally] : strength was normal in both lower extremities [Romberg's Sign] : Romberg's sign was negtive [Coordination - Dysmetria Impaired Finger-to-Nose Bilateral] : not present [Dysdiadochokinesia Bilaterally] : not present

## 2019-04-09 NOTE — REVIEW OF SYSTEMS
[Fever] : no fever [Chills] : no chills [Feeling Poorly] : not feeling poorly [Feeling Tired] : not feeling tired [Suicidal] : not suicidal [Anxiety] : no anxiety [Depression] : no depression [Confused or Disoriented] : no confusion [Memory Lapses or Loss] : no memory loss [Decr. Concentrating Ability] : no decrease in concentrating ability [Difficulty with Language] : no ~M difficulty with language [Changed Thought Patterns] : no change in thought patterns [Repeating Questions] : no repeated questioning about recent events [Facial Weakness] : no facial weakness [Arm Weakness] : no arm weakness [Hand Weakness] : no hand weakness [Leg Weakness] : no leg weakness [Poor Coordination] : good coordination [Difficulty Writing] : no difficulty writing [Difficulties in Speech] : no speech difficulties [Numbness] : no numbness [Seizures] : no convulsions [Tingling] : no tingling [Dizziness] : no dizziness [Fainting] : no fainting [Vertigo] : no vertigo [Lightheadedness] : no lightheadedness [Difficulty Walking] : no difficulty walking [Tension Headache] : no tension-type headache [Inability to Walk] : able to walk [Ataxia] : no ataxia [Discharge From Eyes] : no purulent discharge from the eyes [Eyesight Problems] : no eyesight problems [Loss Of Hearing] : no hearing loss [Earache] : no earache [Nosebleeds] : no nosebleeds [Chest Pain] : no chest pain [Nasal Discharge] : no nasal discharge [Shortness Of Breath] : no shortness of breath [Palpitations] : no palpitations [Wheezing] : no wheezing [Cough] : no cough [Abdominal Pain] : no abdominal pain [Constipation] : no constipation [Vomiting] : no vomiting [Dysuria] : no dysuria [Diarrhea] : no diarrhea [Incontinence] : no incontinence [Joint Pain] : no joint pain [Hesitancy] : no urinary hesitancy [Nocturia] : no nocturia [Joint Stiffness] : no joint stiffness [Joint Swelling] : no joint swelling [Skin Lesions] : no skin lesions [Skin Wound] : no skin wound [Easy Bleeding] : no tendency for easy bleeding [Easy Bruising] : no tendency for easy bruising

## 2019-04-29 NOTE — ED ADULT NURSE NOTE - CAS TRG GENERAL NORM CIRC DET
Strong peripheral pulses Pt has made significant progress over the course of hospitalization. With continuous psychotherapy from the treatment team and the medications, patient reports feeling better, sleeping and eating well. Thought process and insight improved. Pt was calm and cooperative and was in good behavioral control. Patient denied any suicidal or homicidal ideations. Patient denied any auditory or visual hallucinations. Pt was evaluated by treatment team, pt is stable for discharge and patient shows no imminent danger to self, others or property at this time. Pt's mother felt comfortable and safe with pt's discharge, had no safety concerns.

## 2019-07-22 ENCOUNTER — APPOINTMENT (OUTPATIENT)
Dept: ELECTROPHYSIOLOGY | Facility: CLINIC | Age: 74
End: 2019-07-22
Payer: MEDICARE

## 2019-07-22 PROCEDURE — 93298 REM INTERROG DEV EVAL SCRMS: CPT

## 2019-07-22 PROCEDURE — 93299: CPT

## 2019-09-13 NOTE — DISCHARGE NOTE ADULT - PRINCIPAL DIAGNOSIS
Ok please call Reggie Calos and let him know the price. Advise him that I want to wait for the ear nose and throat doctor to evaluate him before we schedule anything.  Cerebral ischemia

## 2019-09-16 ENCOUNTER — NON-APPOINTMENT (OUTPATIENT)
Age: 74
End: 2019-09-16

## 2019-09-16 ENCOUNTER — APPOINTMENT (OUTPATIENT)
Dept: ELECTROPHYSIOLOGY | Facility: CLINIC | Age: 74
End: 2019-09-16
Payer: MEDICARE

## 2019-09-16 VITALS
DIASTOLIC BLOOD PRESSURE: 78 MMHG | WEIGHT: 180 LBS | SYSTOLIC BLOOD PRESSURE: 143 MMHG | BODY MASS INDEX: 26.66 KG/M2 | HEIGHT: 69 IN | HEART RATE: 72 BPM | OXYGEN SATURATION: 97 %

## 2019-09-16 PROCEDURE — 93290 INTERROG DEV EVAL ICPMS IP: CPT

## 2019-09-16 PROCEDURE — 99214 OFFICE O/P EST MOD 30 MIN: CPT | Mod: 25

## 2019-09-16 PROCEDURE — 93000 ELECTROCARDIOGRAM COMPLETE: CPT

## 2019-09-20 NOTE — HISTORY OF PRESENT ILLNESS
[FreeTextEntry1] : I had the pleasure of seeing your patient Arpit De León today in the arrhythmia clinic of [facility]. As you well know the patient is a delightful 74-year-old gentleman with a history of hypertension hyperlipidemia and a recent cerebral embolism. The patient presented to the hospital in March of this year with neurologic symptoms including a fascia. He was found to have a left MCA territory cerebral embolism. He has made a good recovery with only residual defects. He was discharged on Plavix and aspirin and recently the aspirin has been discontinued. An echocardiogram demonstrated normal left ventricular size and function with an EF of 65% and left atrium of 3.6 cm. He underwent implantation of an implantable loop recorder. In follow up he did have 2 brief episodes of an atrial tachycardia (not atrial fibrillation). When I  saw him previously we began metoprolol 25mg. He returns to clinic today for followup.\par \par Overall he is doing well. When I asked him if he had any palpitations, lightheadedness, presyncope or syncope he said no. However occasionally at night he gets what he describes as palpitations and this correlates with PVCs. On his implantable loop recorder he did have a run of atrial tachycardia at 182 beats per minute for 2 minutes but this was asymptomatic. His blood pressure today was 143/78 his pulse was 72 and regular. His EKG revealed sinus rhythm with low voltage is 70 beats per minute and a right axis.

## 2019-09-20 NOTE — DISCUSSION/SUMMARY
[FreeTextEntry1] : In summary the patient is a 74-year-old gentleman with a history of cryptogenic stroke and runs of atrial tachycardia. He is doing well on low dose beta blockers and is only minimally symptomatic. We discussed treatment options and for now he's content stating as is which I think is reasonable.

## 2019-12-24 ENCOUNTER — APPOINTMENT (OUTPATIENT)
Dept: ELECTROPHYSIOLOGY | Facility: CLINIC | Age: 74
End: 2019-12-24
Payer: MEDICARE

## 2019-12-24 PROCEDURE — 93298 REM INTERROG DEV EVAL SCRMS: CPT

## 2019-12-24 PROCEDURE — 93299: CPT

## 2020-01-24 ENCOUNTER — APPOINTMENT (OUTPATIENT)
Dept: ELECTROPHYSIOLOGY | Facility: CLINIC | Age: 75
End: 2020-01-24
Payer: MEDICARE

## 2020-01-24 PROCEDURE — 93298 REM INTERROG DEV EVAL SCRMS: CPT

## 2020-01-24 PROCEDURE — G2066: CPT

## 2020-02-24 ENCOUNTER — APPOINTMENT (OUTPATIENT)
Dept: ELECTROPHYSIOLOGY | Facility: CLINIC | Age: 75
End: 2020-02-24
Payer: MEDICARE

## 2020-02-24 PROCEDURE — 93298 REM INTERROG DEV EVAL SCRMS: CPT

## 2020-02-24 PROCEDURE — G2066: CPT

## 2020-04-16 ENCOUNTER — APPOINTMENT (OUTPATIENT)
Dept: ELECTROPHYSIOLOGY | Facility: CLINIC | Age: 75
End: 2020-04-16
Payer: MEDICARE

## 2020-04-16 PROCEDURE — G2066: CPT

## 2020-04-16 PROCEDURE — 93298 REM INTERROG DEV EVAL SCRMS: CPT

## 2020-05-18 ENCOUNTER — APPOINTMENT (OUTPATIENT)
Dept: ELECTROPHYSIOLOGY | Facility: CLINIC | Age: 75
End: 2020-05-18
Payer: MEDICARE

## 2020-05-18 PROCEDURE — 93298 REM INTERROG DEV EVAL SCRMS: CPT

## 2020-05-18 PROCEDURE — G2066: CPT

## 2020-06-01 ENCOUNTER — APPOINTMENT (OUTPATIENT)
Dept: ELECTROPHYSIOLOGY | Facility: CLINIC | Age: 75
End: 2020-06-01
Payer: MEDICARE

## 2020-06-01 ENCOUNTER — NON-APPOINTMENT (OUTPATIENT)
Age: 75
End: 2020-06-01

## 2020-06-01 VITALS
OXYGEN SATURATION: 99 % | BODY MASS INDEX: 26.96 KG/M2 | HEART RATE: 69 BPM | WEIGHT: 182 LBS | HEIGHT: 69 IN | DIASTOLIC BLOOD PRESSURE: 79 MMHG | SYSTOLIC BLOOD PRESSURE: 137 MMHG

## 2020-06-01 PROCEDURE — 93000 ELECTROCARDIOGRAM COMPLETE: CPT

## 2020-06-01 PROCEDURE — 99213 OFFICE O/P EST LOW 20 MIN: CPT

## 2020-06-01 NOTE — REASON FOR VISIT
[Follow-Up - Clinic] : a clinic follow-up of [Supraventricular Tachycardia] : supraventricular tachycardia [FreeTextEntry1] : CVA

## 2020-06-01 NOTE — DISCUSSION/SUMMARY
[FreeTextEntry1] : In summary the patient is a 74-year-old gentleman with a history of cryptogenic stroke status post loop recorder placement. He is now having recurrent episodes of a narrow complex tachycardia consistent with a reentrant mechanism. He is breaking through metoprolol and is symptomatic with these episodes. We discussed the options of increasing medication Versed and EP study with an eye towards catheter ablation. We discussed the procedures, outcomes and risks of extraction at length and the patient has opted to proceed with an ablation. We will hold his metoprolol for a few days prior to this study.

## 2020-06-01 NOTE — HISTORY OF PRESENT ILLNESS
[FreeTextEntry1] : I had the pleasure of seeing your patient Arpit De León today in the arrhythmia clinic of [facility]. As you well know the patient is a delightful 74-year-old gentleman with a history of hypertension hyperlipidemia and a recent cerebral embolism. The patient presented to the hospital in March of this year with neurologic symptoms including a fascia. He was found to have a left MCA territory cerebral embolism. He has made a good recovery with only residual defects. He was discharged on Plavix and aspirin and recently the aspirin has been discontinued. An echocardiogram demonstrated normal left ventricular size and function with an EF of 65% and left atrium of 3.6 cm. He underwent implantation of an implantable loop recorder. In follow up he did have 2 brief episodes of an atrial tachycardia (not atrial fibrillation). When I  saw him previously we began metoprolol 25mg. \par \par Today in clinic the patient is overall doing fairly well. He has had recurrent episodes of SVT some of which have been symptomatic. His blood pressure today was 137/79 his pulse was 69 regular. His EKG revealed sinus rhythm at 70 beats per minute and was unchanged. Interrogation of his implantable loop recorder reveals that the patient has had a number of episodes of SVT since his last event. On January 16 he had multiple episodes. The patient reports he was in a casino that day and had been drinking scotch and was dehydrated. He does report some lightheadedness with the episodes. They appear to be a narrow complex tachycardia with rates between 160 and 190 beats per minute. The patient had a 42 second episode of February 28 and a 34 second episode on April 4. The episodes do seem to start with an APC.

## 2020-06-18 ENCOUNTER — APPOINTMENT (OUTPATIENT)
Dept: ELECTROPHYSIOLOGY | Facility: CLINIC | Age: 75
End: 2020-06-18
Payer: MEDICARE

## 2020-06-18 PROCEDURE — G2066: CPT

## 2020-06-18 PROCEDURE — 93298 REM INTERROG DEV EVAL SCRMS: CPT

## 2020-06-29 ENCOUNTER — RX RENEWAL (OUTPATIENT)
Age: 75
End: 2020-06-29

## 2020-07-11 DIAGNOSIS — Z01.818 ENCOUNTER FOR OTHER PREPROCEDURAL EXAMINATION: ICD-10-CM

## 2020-07-12 ENCOUNTER — APPOINTMENT (OUTPATIENT)
Dept: DISASTER EMERGENCY | Facility: CLINIC | Age: 75
End: 2020-07-12

## 2020-07-14 LAB — SARS-COV-2 N GENE NPH QL NAA+PROBE: NOT DETECTED

## 2020-07-15 ENCOUNTER — OUTPATIENT (OUTPATIENT)
Dept: INPATIENT UNIT | Facility: HOSPITAL | Age: 75
LOS: 1 days | End: 2020-07-15
Payer: MEDICARE

## 2020-07-15 VITALS
OXYGEN SATURATION: 99 % | HEART RATE: 58 BPM | HEIGHT: 69 IN | SYSTOLIC BLOOD PRESSURE: 136 MMHG | WEIGHT: 182.1 LBS | DIASTOLIC BLOOD PRESSURE: 63 MMHG | TEMPERATURE: 98 F

## 2020-07-15 VITALS
HEART RATE: 58 BPM | OXYGEN SATURATION: 99 % | TEMPERATURE: 98 F | SYSTOLIC BLOOD PRESSURE: 136 MMHG | DIASTOLIC BLOOD PRESSURE: 63 MMHG

## 2020-07-15 DIAGNOSIS — Z98.890 OTHER SPECIFIED POSTPROCEDURAL STATES: Chronic | ICD-10-CM

## 2020-07-15 DIAGNOSIS — I47.1 SUPRAVENTRICULAR TACHYCARDIA: ICD-10-CM

## 2020-07-15 LAB
ALBUMIN SERPL ELPH-MCNC: 4.1 G/DL — SIGNIFICANT CHANGE UP (ref 3.3–5)
ALP SERPL-CCNC: 101 U/L — SIGNIFICANT CHANGE UP (ref 40–120)
ALT FLD-CCNC: 16 U/L — SIGNIFICANT CHANGE UP (ref 10–45)
ANION GAP SERPL CALC-SCNC: 11 MMOL/L — SIGNIFICANT CHANGE UP (ref 5–17)
APTT BLD: 33.6 SEC — SIGNIFICANT CHANGE UP (ref 27.5–35.5)
AST SERPL-CCNC: 18 U/L — SIGNIFICANT CHANGE UP (ref 10–40)
BILIRUB SERPL-MCNC: 0.4 MG/DL — SIGNIFICANT CHANGE UP (ref 0.2–1.2)
BLD GP AB SCN SERPL QL: NEGATIVE — SIGNIFICANT CHANGE UP
BUN SERPL-MCNC: 22 MG/DL — SIGNIFICANT CHANGE UP (ref 7–23)
CALCIUM SERPL-MCNC: 10.2 MG/DL — SIGNIFICANT CHANGE UP (ref 8.4–10.5)
CHLORIDE SERPL-SCNC: 105 MMOL/L — SIGNIFICANT CHANGE UP (ref 96–108)
CO2 SERPL-SCNC: 24 MMOL/L — SIGNIFICANT CHANGE UP (ref 22–31)
CREAT SERPL-MCNC: 0.77 MG/DL — SIGNIFICANT CHANGE UP (ref 0.5–1.3)
GLUCOSE SERPL-MCNC: 101 MG/DL — HIGH (ref 70–99)
HCT VFR BLD CALC: 42.3 % — SIGNIFICANT CHANGE UP (ref 39–50)
HGB BLD-MCNC: 14.4 G/DL — SIGNIFICANT CHANGE UP (ref 13–17)
INR BLD: 0.93 RATIO — SIGNIFICANT CHANGE UP (ref 0.88–1.16)
MCHC RBC-ENTMCNC: 30.4 PG — SIGNIFICANT CHANGE UP (ref 27–34)
MCHC RBC-ENTMCNC: 34 GM/DL — SIGNIFICANT CHANGE UP (ref 32–36)
MCV RBC AUTO: 89.2 FL — SIGNIFICANT CHANGE UP (ref 80–100)
NRBC # BLD: 0 /100 WBCS — SIGNIFICANT CHANGE UP (ref 0–0)
PLATELET # BLD AUTO: 279 K/UL — SIGNIFICANT CHANGE UP (ref 150–400)
POTASSIUM SERPL-MCNC: 3.5 MMOL/L — SIGNIFICANT CHANGE UP (ref 3.5–5.3)
POTASSIUM SERPL-SCNC: 3.5 MMOL/L — SIGNIFICANT CHANGE UP (ref 3.5–5.3)
PROT SERPL-MCNC: 7 G/DL — SIGNIFICANT CHANGE UP (ref 6–8.3)
PROTHROM AB SERPL-ACNC: 11.1 SEC — SIGNIFICANT CHANGE UP (ref 10.6–13.6)
RBC # BLD: 4.74 M/UL — SIGNIFICANT CHANGE UP (ref 4.2–5.8)
RBC # FLD: 13.7 % — SIGNIFICANT CHANGE UP (ref 10.3–14.5)
RH IG SCN BLD-IMP: POSITIVE — SIGNIFICANT CHANGE UP
SODIUM SERPL-SCNC: 140 MMOL/L — SIGNIFICANT CHANGE UP (ref 135–145)
WBC # BLD: 9 K/UL — SIGNIFICANT CHANGE UP (ref 3.8–10.5)
WBC # FLD AUTO: 9 K/UL — SIGNIFICANT CHANGE UP (ref 3.8–10.5)

## 2020-07-15 PROCEDURE — 80053 COMPREHEN METABOLIC PANEL: CPT

## 2020-07-15 PROCEDURE — 86900 BLOOD TYPING SEROLOGIC ABO: CPT

## 2020-07-15 PROCEDURE — C1730: CPT

## 2020-07-15 PROCEDURE — 86901 BLOOD TYPING SEROLOGIC RH(D): CPT

## 2020-07-15 PROCEDURE — 85027 COMPLETE CBC AUTOMATED: CPT

## 2020-07-15 PROCEDURE — 85730 THROMBOPLASTIN TIME PARTIAL: CPT

## 2020-07-15 PROCEDURE — 93653 COMPRE EP EVAL TX SVT: CPT

## 2020-07-15 PROCEDURE — C1893: CPT

## 2020-07-15 PROCEDURE — 93623 PRGRMD STIMJ&PACG IV RX NFS: CPT

## 2020-07-15 PROCEDURE — C1894: CPT

## 2020-07-15 PROCEDURE — 85610 PROTHROMBIN TIME: CPT

## 2020-07-15 PROCEDURE — C1733: CPT

## 2020-07-15 PROCEDURE — 86850 RBC ANTIBODY SCREEN: CPT

## 2020-07-15 PROCEDURE — 93005 ELECTROCARDIOGRAM TRACING: CPT

## 2020-07-15 NOTE — H&P CARDIOLOGY - HISTORY OF PRESENT ILLNESS
74 y/on  M with implanted loop recorder and hx of HTN, HLD, and acute ischemic L MCA CVA in 2018 (no residual deficits), and paroxysmal SVT.  He was evaluated by EP s/p CVA to evaluate for cardioembolic source and had an echo which showed normal LV size and function with an EF of 65% and a left atrium of 3.6cm.  He also had a loop recorder implanted which showed two episodes of atrial tachycardia (not afib) for which he was started on a beta blocker.  He returned to the EP clinic in June with recurrent episodes of symptomatic SVT.  He reports a strange sensation that is difficult to describe but is similar to palpitations with mild lightheadedness.  He feels the episodes are usually provoked by stress, and the symtpoms resolve spontaneously.  On interrogation of the loopr ecorder he was found to have multiple episodes o SVT with rates between 160 and 190 bpm including a 42 second episode of February 28th and a 34 second episode of April 4th.  He feels well today, reports he is in his usual state of health and denies any recent illnesses or hospitalizations.  Of note patient took all of his home medications (including beta blocker) this morning.

## 2020-07-15 NOTE — H&P CARDIOLOGY - PMH
Cerebrovascular accident (CVA) due to embolism of left middle cerebral artery    High cholesterol    Hypertension    SVT (supraventricular tachycardia)

## 2020-07-15 NOTE — PROGRESS NOTE ADULT - SUBJECTIVE AND OBJECTIVE BOX
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    Electrophysiologist:  MD Guicho Gates MD     Anesthesia:  conscious sedation monitored by anesthesia    Access:  Right femoral vein : 8F  Right femoral vein : 8F   Left femoral vein : 10 F    Description:  After obtaining informed consent, the patient was brought to the electrophysiology laboratory in the fasting state.  Continuous electrocardiographic, hemodynamic and respiratory monitoring was initiated.  The patient was prepped and draped in the usual sterile fashion.  Under local anesthesia and conscious sedation, two 8 F sheaths were introduced into right femoral vein one 10 F  triple port sheath was introduced into left femoral vein.  Standard quadripolar catheters were placed in the right ventricular apex (Radha), right atrium (Radha) and His positions (CRD), and a decapolar catheter advanced into the coronary sinus. A full electrophysiologic study was performed including programmed stimulation from the right ventricle and high right atrium with and without the use of intravenous isuprel. VA conduction was noted at baseline, and was midline, concentric, and decremental. Baseline conduction findings are as documented in procedure note in 4D. During Ventricular stimulation, patient went into atrial flutter (initially was right sided), converted to left sided flutter before converting into atrial fibrillation. Ibutilide 1 mg was used in an attempt to cardiovert but unsuccessful.  Cardioverted to normal sinus rhythm with synchronized cardioversion at 200 J. Decision was made not to do pulmonary vein isolation given patient has implantable loop recorder and afib was not his clinical arrhythmia.  Isoproterenol was infused and ventricular and atrial extrastimulation was performed. An AH jump was observed. SVT was initiated with a jump during AVERP testing. The tachycardia had a CL of 353msec.  Overdrive ventricular pacing demonstrated a VAHV response, with a PPI - TCL of 178 msec, consistent with typical AVNRT. The 8 F sheath was exchanged for an 8.5 F sheath and a non irrigated ablation catheter was introduced through the sheath.  Radiofrequency applications were made in the region of the AVN slow pathway while constantly monitoring temperature, , ECG and intracardiac electrograms.  Junctional beats were seen during ablation. After ablation, there was no further SVT observed At the end of the procedure, the sheaths were removed with manual compression applied for hemostasis.  The patient was transferred to the holding area in stable condition. There were no complications and the patient tolerated the procedure well.    Complications:  none     EBL: 10 ml     Disposition:  Discharge home later on today    Plan:  Bed rest for 4 hours.  Resume home dose of metoprolol succinate and clopidogrel (patient already took this morning dose).

## 2020-07-15 NOTE — H&P CARDIOLOGY - FAMILY HISTORY
Father  Still living? No  Family history of cerebrovascular accident (CVA), Age at diagnosis: Age Unknown  Family history of MI (myocardial infarction), Age at diagnosis: Age Unknown

## 2020-07-20 RX ORDER — METOPROLOL TARTRATE 50 MG
1 TABLET ORAL
Qty: 0 | Refills: 0 | DISCHARGE

## 2020-07-23 ENCOUNTER — APPOINTMENT (OUTPATIENT)
Dept: ELECTROPHYSIOLOGY | Facility: CLINIC | Age: 75
End: 2020-07-23
Payer: MEDICARE

## 2020-07-23 PROCEDURE — 93298 REM INTERROG DEV EVAL SCRMS: CPT

## 2020-07-23 PROCEDURE — G2066: CPT

## 2020-07-27 PROBLEM — I63.412 CEREBRAL INFARCTION DUE TO EMBOLISM OF LEFT MIDDLE CEREBRAL ARTERY: Chronic | Status: ACTIVE | Noted: 2020-07-15

## 2020-07-27 PROBLEM — I47.1 SUPRAVENTRICULAR TACHYCARDIA: Chronic | Status: ACTIVE | Noted: 2020-07-15

## 2020-08-24 ENCOUNTER — APPOINTMENT (OUTPATIENT)
Dept: ELECTROPHYSIOLOGY | Facility: CLINIC | Age: 75
End: 2020-08-24
Payer: MEDICARE

## 2020-08-24 PROCEDURE — 93298 REM INTERROG DEV EVAL SCRMS: CPT

## 2020-08-24 PROCEDURE — G2066: CPT

## 2020-08-31 ENCOUNTER — APPOINTMENT (OUTPATIENT)
Dept: ELECTROPHYSIOLOGY | Facility: CLINIC | Age: 75
End: 2020-08-31
Payer: MEDICARE

## 2020-08-31 ENCOUNTER — NON-APPOINTMENT (OUTPATIENT)
Age: 75
End: 2020-08-31

## 2020-08-31 VITALS
WEIGHT: 182 LBS | HEART RATE: 66 BPM | HEIGHT: 69 IN | SYSTOLIC BLOOD PRESSURE: 116 MMHG | DIASTOLIC BLOOD PRESSURE: 72 MMHG | OXYGEN SATURATION: 98 % | BODY MASS INDEX: 26.96 KG/M2

## 2020-08-31 PROCEDURE — 93000 ELECTROCARDIOGRAM COMPLETE: CPT

## 2020-08-31 PROCEDURE — 93290 INTERROG DEV EVAL ICPMS IP: CPT

## 2020-08-31 PROCEDURE — 99214 OFFICE O/P EST MOD 30 MIN: CPT

## 2020-08-31 RX ORDER — METOPROLOL SUCCINATE 25 MG/1
25 TABLET, EXTENDED RELEASE ORAL DAILY
Qty: 90 | Refills: 3 | Status: DISCONTINUED | COMMUNITY
Start: 2018-12-03 | End: 2020-08-31

## 2020-08-31 NOTE — PHYSICAL EXAM
[Normal Appearance] : normal appearance [General Appearance - Well Developed] : well developed [Well Groomed] : well groomed [General Appearance - Well Nourished] : well nourished [No Deformities] : no deformities [General Appearance - In No Acute Distress] : no acute distress [Normal Conjunctiva] : the conjunctiva exhibited no abnormalities [Eyelids - No Xanthelasma] : the eyelids demonstrated no xanthelasmas [Normal Oral Mucosa] : normal oral mucosa [No Oral Pallor] : no oral pallor [Normal Jugular Venous A Waves Present] : normal jugular venous A waves present [No Oral Cyanosis] : no oral cyanosis [Normal Jugular Venous V Waves Present] : normal jugular venous V waves present [No Jugular Venous Varghese A Waves] : no jugular venous varghese A waves [Respiration, Rhythm And Depth] : normal respiratory rhythm and effort [Auscultation Breath Sounds / Voice Sounds] : lungs were clear to auscultation bilaterally [Exaggerated Use Of Accessory Muscles For Inspiration] : no accessory muscle use [Heart Rate And Rhythm] : heart rate and rhythm were normal [Heart Sounds] : normal S1 and S2 [Murmurs] : no murmurs present [Abdomen Soft] : soft [Abdomen Tenderness] : non-tender [Abnormal Walk] : normal gait [Abdomen Mass (___ Cm)] : no abdominal mass palpated [Nail Clubbing] : no clubbing of the fingernails [Gait - Sufficient For Exercise Testing] : the gait was sufficient for exercise testing [Petechial Hemorrhages (___cm)] : no petechial hemorrhages [Cyanosis, Localized] : no localized cyanosis [Skin Color & Pigmentation] : normal skin color and pigmentation [] : no rash [No Venous Stasis] : no venous stasis [Skin Lesions] : no skin lesions [No Skin Ulcers] : no skin ulcer [No Xanthoma] : no  xanthoma was observed [Oriented To Time, Place, And Person] : oriented to person, place, and time [Mood] : the mood was normal [Affect] : the affect was normal [No Anxiety] : not feeling anxious

## 2020-09-01 NOTE — DISCUSSION/SUMMARY
[FreeTextEntry1] : In summary the patient is a pleasant 75-year-old gentleman with a history of cryptogenic stroke who underwent an ILR implantation and was found to have symptomatic SVT despite beta blocker therapy. 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 He was also noted to have inducible typical and atypical atrial flutter which degenerated into atrial fibrillation in the EP study, but none of these arrhythmias have ever been seen clinically on his ILR, and were therefore not targeted for ablation. Regarding his metoprolol, he is interested in coming off of this medication. He has no history of CAD or HFrEF and his only indication for beta blocker therapy was SVT. Therefore, we will wean him off of his metoprolol succinate over the next two weeks (12.5 mg PO daily x 1 week, then 12.5 mg PO every other day x 1 week, then discontinue). Mr. De León appeared to understand the whole discussion and verbalized that all of his questions were answered to his satisfaction.

## 2020-09-01 NOTE — HISTORY OF PRESENT ILLNESS
[FreeTextEntry1] : I had the pleasure of seeing your patient Arpit De León today in the arrhythmia clinic of [facility]. As you well know the patient is a delightful 75-year-old gentleman with a history of hypertension, hyperlipidemia and a cerebral embolism. The patient presented to the hospital in March 2018 with neurologic symptoms and was found to have a left MCA territory cerebral embolism. He has made a good recovery with no residual deficits. He was discharged on DAPT (aspirin + clopidogrel), but the aspirin was eventually discontinued. An echocardiogram demonstrated normal left ventricular size and function with an EF of 65% and left atrium of 3.6 cm. He underwent implantation of an implantable loop recorder. On January 16, 2020 he had multiple episodes of chest tightness. The patient reported he was in a casino that day and had been drinking scotch and was dehydrated. He does report some lightheadedness with the episodes. Interrogation of his ILR revealed multiple episodes of narrow complex tachycardia with rates between 160 and 190 beats per minute. He ha an additional 42 second episode of February 28 and a 34 second episode on April 4. 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 Right-sided atrial flutter was also inducible during the EPS, which transitioned to left-sided atrial flutter, and then degenerated into atrial fibrillation and required external cardioversion. These arrhythmias were not consistent with the clinical arrhythmias seen on his ILR and the decision was made to not pursue them.\par \par Today, the patient reports he doing well overall. He has had no recurrence of symptoms of SVT (primarily chest tightness) since his ablation on 7/15/20.  His blood pressure today is 116/72 his pulse is 65 and regular. His EKG shows sinus rhythm at 66 beats per minute and is unchanged from his prior EKG. Interrogation of his implantable loop recorder reveals no further episodes of SVT since his ablation. The episode of atrial fibrillation noted on his ILR from 7/15/20 was induced during his EP study and not a clinical arrhythmia. He wants to know if he can stop taking his metoprolol today.

## 2020-09-29 ENCOUNTER — APPOINTMENT (OUTPATIENT)
Dept: ELECTROPHYSIOLOGY | Facility: CLINIC | Age: 75
End: 2020-09-29
Payer: MEDICARE

## 2020-09-29 PROCEDURE — 93298 REM INTERROG DEV EVAL SCRMS: CPT

## 2020-09-29 PROCEDURE — G2066: CPT

## 2020-11-03 ENCOUNTER — APPOINTMENT (OUTPATIENT)
Dept: ELECTROPHYSIOLOGY | Facility: CLINIC | Age: 75
End: 2020-11-03
Payer: MEDICARE

## 2020-11-03 PROCEDURE — 93298 REM INTERROG DEV EVAL SCRMS: CPT

## 2020-11-03 PROCEDURE — G2066: CPT

## 2020-11-12 ENCOUNTER — APPOINTMENT (OUTPATIENT)
Dept: OTOLARYNGOLOGY | Facility: CLINIC | Age: 75
End: 2020-11-12

## 2020-11-17 ENCOUNTER — EMERGENCY (EMERGENCY)
Facility: HOSPITAL | Age: 75
LOS: 1 days | Discharge: HOME | End: 2020-11-17
Attending: EMERGENCY MEDICINE
Payer: MEDICARE

## 2020-11-17 VITALS
SYSTOLIC BLOOD PRESSURE: 149 MMHG | TEMPERATURE: 98 F | WEIGHT: 182.1 LBS | HEIGHT: 69 IN | DIASTOLIC BLOOD PRESSURE: 66 MMHG | HEART RATE: 78 BPM | RESPIRATION RATE: 20 BRPM | OXYGEN SATURATION: 100 %

## 2020-11-17 DIAGNOSIS — Z98.890 OTHER SPECIFIED POSTPROCEDURAL STATES: Chronic | ICD-10-CM

## 2020-11-17 LAB
ALBUMIN SERPL ELPH-MCNC: 4.4 G/DL — SIGNIFICANT CHANGE UP (ref 3.3–5)
ALP SERPL-CCNC: 103 U/L — SIGNIFICANT CHANGE UP (ref 40–120)
ALT FLD-CCNC: 28 U/L — SIGNIFICANT CHANGE UP (ref 10–45)
ANION GAP SERPL CALC-SCNC: 15 MMOL/L — SIGNIFICANT CHANGE UP (ref 5–17)
APPEARANCE UR: ABNORMAL
AST SERPL-CCNC: 26 U/L — SIGNIFICANT CHANGE UP (ref 10–40)
BACTERIA # UR AUTO: NEGATIVE — SIGNIFICANT CHANGE UP
BASOPHILS # BLD AUTO: 0.05 K/UL — SIGNIFICANT CHANGE UP (ref 0–0.2)
BASOPHILS NFR BLD AUTO: 0.3 % — SIGNIFICANT CHANGE UP (ref 0–2)
BILIRUB SERPL-MCNC: 0.8 MG/DL — SIGNIFICANT CHANGE UP (ref 0.2–1.2)
BILIRUB UR-MCNC: NEGATIVE — SIGNIFICANT CHANGE UP
BUN SERPL-MCNC: 29 MG/DL — HIGH (ref 7–23)
CALCIUM SERPL-MCNC: 10.4 MG/DL — SIGNIFICANT CHANGE UP (ref 8.4–10.5)
CHLORIDE SERPL-SCNC: 107 MMOL/L — SIGNIFICANT CHANGE UP (ref 96–108)
CO2 SERPL-SCNC: 18 MMOL/L — LOW (ref 22–31)
COLOR SPEC: YELLOW — SIGNIFICANT CHANGE UP
CREAT SERPL-MCNC: 1.03 MG/DL — SIGNIFICANT CHANGE UP (ref 0.5–1.3)
DIFF PNL FLD: ABNORMAL
EOSINOPHIL # BLD AUTO: 0 K/UL — SIGNIFICANT CHANGE UP (ref 0–0.5)
EOSINOPHIL NFR BLD AUTO: 0 % — SIGNIFICANT CHANGE UP (ref 0–6)
EPI CELLS # UR: 0 /HPF — SIGNIFICANT CHANGE UP
GLUCOSE SERPL-MCNC: 103 MG/DL — HIGH (ref 70–99)
GLUCOSE UR QL: NEGATIVE — SIGNIFICANT CHANGE UP
HCT VFR BLD CALC: 45.5 % — SIGNIFICANT CHANGE UP (ref 39–50)
HGB BLD-MCNC: 15.5 G/DL — SIGNIFICANT CHANGE UP (ref 13–17)
IMM GRANULOCYTES NFR BLD AUTO: 0.6 % — SIGNIFICANT CHANGE UP (ref 0–1.5)
KETONES UR-MCNC: NEGATIVE — SIGNIFICANT CHANGE UP
LEUKOCYTE ESTERASE UR-ACNC: NEGATIVE — SIGNIFICANT CHANGE UP
LIDOCAIN IGE QN: 49 U/L — SIGNIFICANT CHANGE UP (ref 7–60)
LYMPHOCYTES # BLD AUTO: 1.46 K/UL — SIGNIFICANT CHANGE UP (ref 1–3.3)
LYMPHOCYTES # BLD AUTO: 9.4 % — LOW (ref 13–44)
MCHC RBC-ENTMCNC: 29.6 PG — SIGNIFICANT CHANGE UP (ref 27–34)
MCHC RBC-ENTMCNC: 34.1 GM/DL — SIGNIFICANT CHANGE UP (ref 32–36)
MCV RBC AUTO: 86.8 FL — SIGNIFICANT CHANGE UP (ref 80–100)
MONOCYTES # BLD AUTO: 0.96 K/UL — HIGH (ref 0–0.9)
MONOCYTES NFR BLD AUTO: 6.2 % — SIGNIFICANT CHANGE UP (ref 2–14)
NEUTROPHILS # BLD AUTO: 12.96 K/UL — HIGH (ref 1.8–7.4)
NEUTROPHILS NFR BLD AUTO: 83.5 % — HIGH (ref 43–77)
NITRITE UR-MCNC: NEGATIVE — SIGNIFICANT CHANGE UP
NRBC # BLD: 0 /100 WBCS — SIGNIFICANT CHANGE UP (ref 0–0)
PH UR: 6 — SIGNIFICANT CHANGE UP (ref 5–8)
PLATELET # BLD AUTO: 351 K/UL — SIGNIFICANT CHANGE UP (ref 150–400)
POTASSIUM SERPL-MCNC: 3.4 MMOL/L — LOW (ref 3.5–5.3)
POTASSIUM SERPL-SCNC: 3.4 MMOL/L — LOW (ref 3.5–5.3)
PROT SERPL-MCNC: 7.4 G/DL — SIGNIFICANT CHANGE UP (ref 6–8.3)
PROT UR-MCNC: ABNORMAL
RBC # BLD: 5.24 M/UL — SIGNIFICANT CHANGE UP (ref 4.2–5.8)
RBC # FLD: 13.5 % — SIGNIFICANT CHANGE UP (ref 10.3–14.5)
RBC CASTS # UR COMP ASSIST: 496 /HPF — HIGH (ref 0–4)
SODIUM SERPL-SCNC: 140 MMOL/L — SIGNIFICANT CHANGE UP (ref 135–145)
SP GR SPEC: 1.02 — SIGNIFICANT CHANGE UP (ref 1.01–1.02)
UROBILINOGEN FLD QL: NEGATIVE — SIGNIFICANT CHANGE UP
WBC # BLD: 15.53 K/UL — HIGH (ref 3.8–10.5)
WBC # FLD AUTO: 15.53 K/UL — HIGH (ref 3.8–10.5)
WBC UR QL: 1 /HPF — SIGNIFICANT CHANGE UP (ref 0–5)

## 2020-11-17 PROCEDURE — 99284 EMERGENCY DEPT VISIT MOD MDM: CPT

## 2020-11-17 PROCEDURE — 74177 CT ABD & PELVIS W/CONTRAST: CPT | Mod: 26

## 2020-11-17 PROCEDURE — 85025 COMPLETE CBC W/AUTO DIFF WBC: CPT

## 2020-11-17 PROCEDURE — 74177 CT ABD & PELVIS W/CONTRAST: CPT

## 2020-11-17 PROCEDURE — 87086 URINE CULTURE/COLONY COUNT: CPT

## 2020-11-17 PROCEDURE — 96360 HYDRATION IV INFUSION INIT: CPT | Mod: XU

## 2020-11-17 PROCEDURE — 99284 EMERGENCY DEPT VISIT MOD MDM: CPT | Mod: 25

## 2020-11-17 PROCEDURE — 81001 URINALYSIS AUTO W/SCOPE: CPT

## 2020-11-17 PROCEDURE — 80053 COMPREHEN METABOLIC PANEL: CPT

## 2020-11-17 PROCEDURE — 83690 ASSAY OF LIPASE: CPT

## 2020-11-17 RX ORDER — PIPERACILLIN AND TAZOBACTAM 4; .5 G/20ML; G/20ML
3.38 INJECTION, POWDER, LYOPHILIZED, FOR SOLUTION INTRAVENOUS ONCE
Refills: 0 | Status: DISCONTINUED | OUTPATIENT
Start: 2020-11-17 | End: 2020-11-17

## 2020-11-17 RX ORDER — SODIUM CHLORIDE 9 MG/ML
500 INJECTION INTRAMUSCULAR; INTRAVENOUS; SUBCUTANEOUS ONCE
Refills: 0 | Status: COMPLETED | OUTPATIENT
Start: 2020-11-17 | End: 2020-11-17

## 2020-11-17 RX ADMIN — SODIUM CHLORIDE 500 MILLILITER(S): 9 INJECTION INTRAMUSCULAR; INTRAVENOUS; SUBCUTANEOUS at 19:32

## 2020-11-17 RX ADMIN — SODIUM CHLORIDE 500 MILLILITER(S): 9 INJECTION INTRAMUSCULAR; INTRAVENOUS; SUBCUTANEOUS at 20:58

## 2020-11-17 NOTE — ED ADULT NURSE REASSESSMENT NOTE - NS ED NURSE REASSESS COMMENT FT1
Received report from Brian CARRERO @ [id number]. Pt sitting comfortable at present. No acute distress at present

## 2020-11-17 NOTE — ED PROVIDER NOTE - PHYSICAL EXAMINATION
GENERAL: elderly male, lying in bed, NAD. Vital signs are within normal limits  HEENT: NC/AT, conjunctiva noninjected, sclera anicteric, moist mucous membranes,  NECK: Supple, trachea midline  LUNG: Nonlabored respirations no wheezes, rales  CV: RRR, Pulses- Radial: 2+ b/l  ABDOMEN: Nondistended, RLQ tenderness (mild), neg rios, neg rosvings, no rebound or guarding.  MSK: No visible deformities, nontender extremities, no CVa tenderness.  NEURO: AAOx4 (to person, place, time, event), no tremor, steady gait  PSYCH: Normal mood and affect

## 2020-11-17 NOTE — ED PROVIDER NOTE - OBJECTIVE STATEMENT
75 y.o. male hx of stroke on plavix, HTN, HLD presents to the ED for 1 day of RLQ pain, described as strong, w/o radiation, w/o nausea, vomiting, diarrhea, urinary complaints, fevers, chills, back pain. No hx of stones. No abdominal surgeries. Pain 10/10 3hrs when onset, now 1/10. Did not take analgesia. Never had before,

## 2020-11-17 NOTE — ED ADULT NURSE NOTE - CHPI ED NUR SYMPTOMS NEG
no nausea/no chills/no fever/no blood in stool/no vomiting/no burning urination/no dysuria/no diarrhea/no hematuria/no abdominal distension

## 2020-11-17 NOTE — ED PROVIDER NOTE - NSFOLLOWUPINSTRUCTIONS_ED_ALL_ED_FT
- Continue all regular medications  - For pain, take tylenol or ibuprofen as directed on the packaging  - Follow up with a urologist within 3 days  - Follow up with a vascular surgeon in the next 4 weeks for findings of a saccular aneurysm on your CT scan  - Follow up with your PMD in the next 4 weeks for findings of ground glass opacities in your lungs and L posterior iliac bones findings  - You were given copies of labs and/or imaging results if applicable, please take them to your follow up appointments  - Return to the ER for fevers, constant vomiting, severe pain or any worsening symptoms or concerns

## 2020-11-17 NOTE — ED PROVIDER NOTE - PROGRESS NOTE DETAILS
Levar PGY2: Patient reevaluated and feeling better. VSS. Reviewed and discussed results with patient. Discussed importance of follow up and return precautions. Patient agrees with plan.

## 2020-11-17 NOTE — ED PROVIDER NOTE - PATIENT PORTAL LINK FT
You can access the FollowMyHealth Patient Portal offered by [facility] by registering at the following website: [email or web address]. By joining [employer]’s FollowMyHealth portal, you will also be able to view your health information using other applications (apps) compatible with our system.

## 2020-11-17 NOTE — ED ADULT TRIAGE NOTE - PATIENT ON (OXYGEN DELIVERY METHOD)
Writer called mobile number listed [phone].  Per recorded message this number is not in service.  Called alternative number listed and no answer,     room air

## 2020-11-17 NOTE — ED PROVIDER NOTE - NSFOLLOWUPCLINICS_GEN_ALL_ED_FT
[facility]  Urology  [address] [place] [id number]  Phone: [phone]  Fax:     [facility]  General Surgery  [address] [place] [address]  Phone: [phone]  Fax:   Follow Up Time:

## 2020-11-17 NOTE — ED ADULT NURSE NOTE - OBJECTIVE STATEMENT
74 y/o male ex-smoker, aox4 with PMH of Cerebrovascular accident (CVA), High cholesterol, Hypertension, SVT (supraventricular tachycardia) c/o acute onset of RLQ abdominal pain x3 hours while grocery shopping. Pain is now resolved. Movement makes the pain worse, did not try taking anything for pain at home. Denies any sob, cp, nausea, vomiting, diarrhea, dysuria or frequency. 74 y/o male aox4 with PMH of Cerebrovascular accident (CVA), High cholesterol, Hypertension, SVT (supraventricular tachycardia) c/o acute onset of RLQ abdominal pain x3 hours while grocery shopping. Pain is now resolved. Movement makes the pain worse, did not try taking anything for pain at home. Denies any sob, cp, nausea, vomiting, diarrhea, dysuria or frequency.

## 2020-11-17 NOTE — ED PROVIDER NOTE - NS ED ROS FT
CONSTITUTIONAL: No fevers, chills, fatigue, unexpected weight change, decreased appetite  CV: No chest pain  PULM: No cough  GI: ABDOMINAL PAIN. No nausea, vomiting, diarrhea, constipation, bloody stools  : No dysuria, polyuria, hematuria  SKIN: No rashes, swelling  MSK: No back pain, flank pain

## 2020-11-17 NOTE — ED PROVIDER NOTE - ATTENDING CONTRIBUTION TO CARE
RGUJRAL 76yo male hx HTN, HLD, SVT, CVA on plavix presents with abdominal pain x 1 day. States prior to arrival he had RLQ abd pain that was sharp and localized. Denies any testicular pain, n/v/d. + Flatus. Denies any cough, uri. Able to urinate. No hx kidney stone.   Patient states since arrival to the ED pain is now subsiding and about 1-2.   On exam, Patient is awake, alert and oriented x 3.  Patient is well appearing and in no acute distress.  NCAT  Neck is supple  Lungs are CTA B/L,+S1S2 no murmurs,  Abdomen:Soft nd/+RLQ tender mild +bs no rebound or guarding.  Extremity no edema or calf tender.  Skin with no rash.  Neuro CN3-12 intact. Strength 5/5 in upper and lower extremities. Nml Sensation.Gait normal.   Check Labs, UA. Eval for stone vs appy. Pt declined pain meds.

## 2020-11-18 VITALS
TEMPERATURE: 98 F | HEART RATE: 60 BPM | DIASTOLIC BLOOD PRESSURE: 60 MMHG | OXYGEN SATURATION: 99 % | RESPIRATION RATE: 16 BRPM | SYSTOLIC BLOOD PRESSURE: 170 MMHG

## 2020-11-18 LAB
CULTURE RESULTS: NO GROWTH — SIGNIFICANT CHANGE UP
SPECIMEN SOURCE: SIGNIFICANT CHANGE UP

## 2020-11-19 ENCOUNTER — APPOINTMENT (OUTPATIENT)
Dept: CARDIOLOGY | Facility: CLINIC | Age: 75
End: 2020-11-19
Payer: MEDICARE

## 2020-11-19 VITALS
SYSTOLIC BLOOD PRESSURE: 142 MMHG | WEIGHT: 180 LBS | HEIGHT: 69 IN | OXYGEN SATURATION: 97 % | DIASTOLIC BLOOD PRESSURE: 76 MMHG | BODY MASS INDEX: 26.66 KG/M2 | TEMPERATURE: 98 F | HEART RATE: 82 BPM

## 2020-11-19 PROCEDURE — 99204 OFFICE O/P NEW MOD 45 MIN: CPT

## 2020-11-19 NOTE — PHYSICAL EXAM
[General Appearance - Well Developed] : well developed [Normal Appearance] : normal appearance [Well Groomed] : well groomed [General Appearance - Well Nourished] : well nourished [No Deformities] : no deformities [General Appearance - In No Acute Distress] : no acute distress [Normal Conjunctiva] : the conjunctiva exhibited no abnormalities [Eyelids - No Xanthelasma] : the eyelids demonstrated no xanthelasmas [Normal Oral Mucosa] : normal oral mucosa [No Oral Pallor] : no oral pallor [No Oral Cyanosis] : no oral cyanosis [Heart Rate And Rhythm] : heart rate and rhythm were normal [Heart Sounds] : normal S1 and S2 [Murmurs] : no murmurs present [Respiration, Rhythm And Depth] : normal respiratory rhythm and effort [Exaggerated Use Of Accessory Muscles For Inspiration] : no accessory muscle use [Auscultation Breath Sounds / Voice Sounds] : lungs were clear to auscultation bilaterally [Abdomen Soft] : soft [Abdomen Tenderness] : non-tender [Abdomen Mass (___ Cm)] : no abdominal mass palpated [Abnormal Walk] : normal gait [Gait - Sufficient For Exercise Testing] : the gait was sufficient for exercise testing [Nail Clubbing] : no clubbing of the fingernails [Cyanosis, Localized] : no localized cyanosis [Petechial Hemorrhages (___cm)] : no petechial hemorrhages [] : no ischemic changes [Oriented To Time, Place, And Person] : oriented to person, place, and time [Affect] : the affect was normal [Mood] : the mood was normal [No Anxiety] : not feeling anxious

## 2020-11-19 NOTE — ASSESSMENT
[FreeTextEntry1] : Assessment\par 1.  Small, saccular Abdominal aortic aneurysm \par \par Plan:\par 1.  Continue excellent BP control\par 2.  Will perform ultrasound of the Abdominal aorta\par 3.  Would perform dedicated CT angio in 3 months to define anatomy and to surveil\par 4.  If this remains stable, then continue surveillance

## 2020-11-19 NOTE — REASON FOR VISIT
[FreeTextEntry1] : 75M HTN, HLD, L MCA embolism here for evaluation of a small, saccular aneurysm of the infrarenal abominal aorta.\par \par This was found on CT scan\par No prior history of aneurysm\par There is no prior imaging of the abominal aorta\par \par **the CT that was done 2 days ago was not a dedicated CT angio **\par \par There is also some calcified aortic athero that I saw on the CT\par NO claudication\par No pulsatile abdominal pains or masses

## 2020-12-01 ENCOUNTER — APPOINTMENT (OUTPATIENT)
Dept: OTOLARYNGOLOGY | Facility: CLINIC | Age: 75
End: 2020-12-01
Payer: MEDICARE

## 2020-12-01 VITALS
SYSTOLIC BLOOD PRESSURE: 138 MMHG | HEART RATE: 72 BPM | TEMPERATURE: 97.3 F | DIASTOLIC BLOOD PRESSURE: 62 MMHG | BODY MASS INDEX: 26.66 KG/M2 | WEIGHT: 180 LBS | HEIGHT: 69 IN

## 2020-12-01 VITALS
HEART RATE: 72 BPM | HEIGHT: 69 IN | BODY MASS INDEX: 26.96 KG/M2 | DIASTOLIC BLOOD PRESSURE: 62 MMHG | WEIGHT: 182 LBS | SYSTOLIC BLOOD PRESSURE: 138 MMHG | TEMPERATURE: 97.3 F

## 2020-12-01 DIAGNOSIS — Z78.9 OTHER SPECIFIED HEALTH STATUS: ICD-10-CM

## 2020-12-01 PROCEDURE — 99204 OFFICE O/P NEW MOD 45 MIN: CPT

## 2020-12-01 NOTE — PHYSICAL EXAM
[Normal] : no rashes [FreeTextEntry1] : the L tongue surg site is seen. see suture marks but no clear lesion

## 2020-12-01 NOTE — HISTORY OF PRESENT ILLNESS
[None] : No associated symptoms are reported. [de-identified] : Mr. De León is a 74 yo male referred by Dr. FERNANDO Carroll (Dentist)for tongue lesion. Reports tongue lesions appeared about 6 months ago and had it checked by dentist who did a biopsy. 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 Occasional alcohol intake\par Denies familial history of head and neck cancer\par Denies recent cough, fever, chill, or changes in taste or smell

## 2020-12-01 NOTE — CONSULT LETTER
[Dear  ___] : Dear  [unfilled], [Courtesy Letter:] : I had the pleasure of seeing your patient, [unfilled], in my office today. [Please see my note below.] : Please see my note below. [Sincerely,] : Sincerely, [FreeTextEntry2] : Crista Carroll MD. DDS [FreeTextEntry3] : Boris Brooks MD

## 2020-12-08 ENCOUNTER — APPOINTMENT (OUTPATIENT)
Dept: ELECTROPHYSIOLOGY | Facility: CLINIC | Age: 75
End: 2020-12-08
Payer: MEDICARE

## 2020-12-08 PROCEDURE — G2066: CPT

## 2020-12-08 PROCEDURE — 93298 REM INTERROG DEV EVAL SCRMS: CPT

## 2020-12-09 ENCOUNTER — APPOINTMENT (OUTPATIENT)
Dept: CT IMAGING | Facility: IMAGING CENTER | Age: 75
End: 2020-12-09
Payer: MEDICARE

## 2020-12-09 ENCOUNTER — APPOINTMENT (OUTPATIENT)
Dept: ULTRASOUND IMAGING | Facility: IMAGING CENTER | Age: 75
End: 2020-12-09
Payer: MEDICARE

## 2020-12-09 ENCOUNTER — RESULT REVIEW (OUTPATIENT)
Age: 75
End: 2020-12-09

## 2020-12-09 ENCOUNTER — OUTPATIENT (OUTPATIENT)
Dept: OUTPATIENT SERVICES | Facility: HOSPITAL | Age: 75
LOS: 1 days | End: 2020-12-09
Payer: MEDICARE

## 2020-12-09 DIAGNOSIS — Z98.890 OTHER SPECIFIED POSTPROCEDURAL STATES: Chronic | ICD-10-CM

## 2020-12-09 DIAGNOSIS — I71.4 ABDOMINAL AORTIC ANEURYSM, WITHOUT RUPTURE: ICD-10-CM

## 2020-12-09 PROCEDURE — 74174 CTA ABD&PLVS W/CONTRAST: CPT

## 2020-12-09 PROCEDURE — 74174 CTA ABD&PLVS W/CONTRAST: CPT | Mod: 26

## 2020-12-09 PROCEDURE — 76770 US EXAM ABDO BACK WALL COMP: CPT | Mod: 26

## 2020-12-09 PROCEDURE — 76775 US EXAM ABDO BACK WALL LIM: CPT

## 2021-01-12 ENCOUNTER — APPOINTMENT (OUTPATIENT)
Dept: ELECTROPHYSIOLOGY | Facility: CLINIC | Age: 76
End: 2021-01-12
Payer: MEDICARE

## 2021-01-12 PROCEDURE — 93298 REM INTERROG DEV EVAL SCRMS: CPT

## 2021-01-12 PROCEDURE — G2066: CPT

## 2021-02-16 ENCOUNTER — APPOINTMENT (OUTPATIENT)
Dept: ELECTROPHYSIOLOGY | Facility: CLINIC | Age: 76
End: 2021-02-16
Payer: MEDICARE

## 2021-02-16 PROCEDURE — 93298 REM INTERROG DEV EVAL SCRMS: CPT

## 2021-02-16 PROCEDURE — G2066: CPT

## 2021-03-01 ENCOUNTER — APPOINTMENT (OUTPATIENT)
Dept: ELECTROPHYSIOLOGY | Facility: CLINIC | Age: 76
End: 2021-03-01
Payer: MEDICARE

## 2021-03-01 ENCOUNTER — NON-APPOINTMENT (OUTPATIENT)
Age: 76
End: 2021-03-01

## 2021-03-01 VITALS
SYSTOLIC BLOOD PRESSURE: 129 MMHG | OXYGEN SATURATION: 97 % | HEART RATE: 78 BPM | HEIGHT: 69 IN | DIASTOLIC BLOOD PRESSURE: 75 MMHG | WEIGHT: 180 LBS | BODY MASS INDEX: 26.66 KG/M2

## 2021-03-01 DIAGNOSIS — I63.512 CEREBRAL INFARCTION DUE TO UNSPECIFIED OCCLUSION OR STENOSIS OF LEFT MIDDLE CEREBRAL ARTERY: ICD-10-CM

## 2021-03-01 PROCEDURE — 99214 OFFICE O/P EST MOD 30 MIN: CPT

## 2021-03-01 PROCEDURE — 93000 ELECTROCARDIOGRAM COMPLETE: CPT | Mod: 59

## 2021-03-01 PROCEDURE — 93290 INTERROG DEV EVAL ICPMS IP: CPT

## 2021-03-01 NOTE — DISCUSSION/SUMMARY
[FreeTextEntry1] : In summary the patient is a pleasant 75-year-old gentleman with a history of cryptogenic stroke who underwent an ILR implantation and was found to have symptomatic SVT despite beta blocker therapy. 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 He was also noted to have inducible typical and atypical atrial flutter which degenerated into atrial fibrillation in the EP study, but none of these arrhythmias have ever been seen clinically on his ILR, and were therefore not targeted for ablation.  Overall he is doing well.  We will restart his metoprolol at 25 mg a day and will have to continue to observe him for atrial fibrillation.  Obviously if he begins having this then he needs to be started on anticoagulation and an ablation certainly would be an option.

## 2021-03-01 NOTE — HISTORY OF PRESENT ILLNESS
[FreeTextEntry1] : I had the pleasure of seeing your patient Arpit De León today in the arrhythmia clinic of [facility]. As you well know the patient is a delightful 75-year-old gentleman with a history of hypertension, hyperlipidemia and a cerebral embolism. The patient presented to the hospital in March 2018 with neurologic symptoms and was found to have a left MCA territory cerebral embolism. He has made a good recovery with no residual deficits. He was discharged on DAPT (aspirin + clopidogrel), but the aspirin was eventually discontinued. An echocardiogram demonstrated normal left ventricular size and function with an EF of 65% and left atrium of 3.6 cm. He underwent implantation of an implantable loop recorder. On January 16, 2020 he had multiple episodes of chest tightness. The patient reported he was in a casino that day and had been drinking scotch and was dehydrated. He does report some lightheadedness with the episodes. Interrogation of his ILR revealed multiple episodes of narrow complex tachycardia with rates between 160 and 190 beats per minute. He had an additional 42 second episode of February 28 and a 34 second episode on April 4. 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 Right-sided atrial flutter was also inducible during the EPS, which transitioned to left-sided atrial flutter, and then degenerated into atrial fibrillation and required external cardioversion. These arrhythmias were not consistent with the clinical arrhythmias seen on his ILR and the decision was made to not pursue them. In follow-up he was doing well and had no further episoides. He returns to clinic today.\par \par Overall he reports doing well. He recently received his 2nd dose of vaccination.  He denies any palpitations, lightheadedness, presyncope or syncope.  He does say he gets upset every now and again when he has to interact with people without masks. His blood pressure was 129/75 and his pulse 78 and regular.  He is EKG demonstrated sinus rhythm at 79 bpm with an old inferior MI and poor R wave progression.  Interrogation of his implantable loop recorder reveals it is functioning normally.  It identified 13 episodes of atrial fibrillation.  However reviewing these episodes demonstrates they are false AF determinations.  Most of them are sinus rhythm with APCs however he does have short episodes of atrial tachycardia.  Longest episode was approximately 2 minutes.  For now I do not think he therefore requires anticoagulation.\par

## 2021-03-02 ENCOUNTER — APPOINTMENT (OUTPATIENT)
Dept: OTOLARYNGOLOGY | Facility: CLINIC | Age: 76
End: 2021-03-02
Payer: MEDICARE

## 2021-03-02 VITALS
RESPIRATION RATE: 18 BRPM | HEART RATE: 70 BPM | BODY MASS INDEX: 26.66 KG/M2 | DIASTOLIC BLOOD PRESSURE: 77 MMHG | HEIGHT: 69 IN | TEMPERATURE: 97.3 F | SYSTOLIC BLOOD PRESSURE: 132 MMHG | WEIGHT: 180 LBS

## 2021-03-02 DIAGNOSIS — Z09 ENCOUNTER FOR FOLLOW-UP EXAMINATION AFTER COMPLETED TREATMENT FOR CONDITIONS OTHER THAN MALIGNANT NEOPLASM: ICD-10-CM

## 2021-03-02 PROCEDURE — 99213 OFFICE O/P EST LOW 20 MIN: CPT

## 2021-03-02 NOTE — PHYSICAL EXAM
[Midline] : trachea located in midline position [de-identified] : no lesion seen at exc site [Normal] : no rashes

## 2021-03-02 NOTE — HISTORY OF PRESENT ILLNESS
[de-identified] : 75 year old male here for follow up with tongue lesion, pt denies oral bleeding dysphagia dysphonia, initially d/x 11/03/2020 by complete exc biopsy w Dr Carroll. , here for re check. had complet exc

## 2021-03-02 NOTE — CONSULT LETTER
[Dear  ___] : Dear  [unfilled], [Courtesy Letter:] : I had the pleasure of seeing your patient, [unfilled], in my office today. [Please see my note below.] : Please see my note below. [Sincerely,] : Sincerely, [FreeTextEntry2] : Crista Carroll DDS, Md ([place], [place]) [FreeTextEntry3] : Boris Brooks MD, FACS\par \par    [facility] [place]\par Associate Chair\par    Department of Otolaryngology\par \par Professor\par Otolaryngology & Molecular Medicine\par [employer]\par

## 2021-03-02 NOTE — REASON FOR VISIT
[Subsequent Evaluation] : a subsequent evaluation for [Other: _____] : [unfilled] [FreeTextEntry2] : follow up for tongue  lesion

## 2021-03-04 ENCOUNTER — NON-APPOINTMENT (OUTPATIENT)
Age: 76
End: 2021-03-04

## 2021-03-16 ENCOUNTER — OUTPATIENT (OUTPATIENT)
Dept: OUTPATIENT SERVICES | Facility: HOSPITAL | Age: 76
LOS: 1 days | End: 2021-03-16
Payer: MEDICARE

## 2021-03-16 ENCOUNTER — APPOINTMENT (OUTPATIENT)
Dept: ULTRASOUND IMAGING | Facility: IMAGING CENTER | Age: 76
End: 2021-03-16
Payer: MEDICARE

## 2021-03-16 DIAGNOSIS — I71.4 ABDOMINAL AORTIC ANEURYSM, WITHOUT RUPTURE: ICD-10-CM

## 2021-03-16 DIAGNOSIS — Z98.890 OTHER SPECIFIED POSTPROCEDURAL STATES: Chronic | ICD-10-CM

## 2021-03-16 PROCEDURE — 76775 US EXAM ABDO BACK WALL LIM: CPT

## 2021-03-16 PROCEDURE — 76770 US EXAM ABDO BACK WALL COMP: CPT | Mod: 26

## 2021-03-19 ENCOUNTER — NON-APPOINTMENT (OUTPATIENT)
Age: 76
End: 2021-03-19

## 2021-03-19 ENCOUNTER — APPOINTMENT (OUTPATIENT)
Dept: CARDIOLOGY | Facility: CLINIC | Age: 76
End: 2021-03-19
Payer: MEDICARE

## 2021-03-19 VITALS
HEIGHT: 69 IN | OXYGEN SATURATION: 100 % | DIASTOLIC BLOOD PRESSURE: 75 MMHG | WEIGHT: 180 LBS | SYSTOLIC BLOOD PRESSURE: 143 MMHG | HEART RATE: 68 BPM | BODY MASS INDEX: 26.66 KG/M2

## 2021-03-19 DIAGNOSIS — E78.5 HYPERLIPIDEMIA, UNSPECIFIED: ICD-10-CM

## 2021-03-19 PROCEDURE — 99214 OFFICE O/P EST MOD 30 MIN: CPT

## 2021-03-19 RX ORDER — FAMOTIDINE 20 MG/1
20 TABLET, FILM COATED ORAL DAILY
Qty: 30 | Refills: 0 | Status: DISCONTINUED | COMMUNITY
End: 2021-03-19

## 2021-03-19 RX ORDER — ATORVASTATIN CALCIUM 40 MG/1
40 TABLET, FILM COATED ORAL
Refills: 0 | Status: DISCONTINUED | COMMUNITY
End: 2021-03-19

## 2021-03-19 NOTE — PHYSICAL EXAM
[General Appearance - Well Developed] : well developed [Normal Appearance] : normal appearance [Well Groomed] : well groomed [General Appearance - Well Nourished] : well nourished [No Deformities] : no deformities [General Appearance - In No Acute Distress] : no acute distress [Normal Conjunctiva] : the conjunctiva exhibited no abnormalities [Eyelids - No Xanthelasma] : the eyelids demonstrated no xanthelasmas [Normal Oral Mucosa] : normal oral mucosa [No Oral Pallor] : no oral pallor [No Oral Cyanosis] : no oral cyanosis [Respiration, Rhythm And Depth] : normal respiratory rhythm and effort [Exaggerated Use Of Accessory Muscles For Inspiration] : no accessory muscle use [Auscultation Breath Sounds / Voice Sounds] : lungs were clear to auscultation bilaterally [Heart Rate And Rhythm] : heart rate and rhythm were normal [Heart Sounds] : normal S1 and S2 [Murmurs] : no murmurs present [Abdomen Soft] : soft [Abdomen Tenderness] : non-tender [Abdomen Mass (___ Cm)] : no abdominal mass palpated [Abnormal Walk] : normal gait [Gait - Sufficient For Exercise Testing] : the gait was sufficient for exercise testing [Nail Clubbing] : no clubbing of the fingernails [Cyanosis, Localized] : no localized cyanosis [Petechial Hemorrhages (___cm)] : no petechial hemorrhages [] : no ischemic changes [Oriented To Time, Place, And Person] : oriented to person, place, and time [Affect] : the affect was normal [Mood] : the mood was normal [No Anxiety] : not feeling anxious

## 2021-03-22 NOTE — REASON FOR VISIT
[Follow-Up - Clinic] : a clinic follow-up of [FreeTextEntry1] : 75M HTN, HLD, L MCA embolism here for evaluation of a small, saccular aneurysm of the infrarenal abominal aorta.\par \par This was found on CT scan, and now is seen on US. \par He has had serial abd US last one in 3/2021 and showed the aneurysm was stable at 3 cm. \par \par \par There is also some calcified aortic athero that I saw on the CT\par NO claudication\par No pulsatile abdominal pains or masses\par \par Meds:\par Amlodipine\par HCTZ\par Lexapro\par Lipitor\par Metoprolol \par Plavix

## 2021-03-22 NOTE — ASSESSMENT
[FreeTextEntry1] : Assessment\par 1.  Small, saccular Abdominal aortic aneurysm \par 2. HTN - controlled \par \par Plan:\par 1.  Continue excellent BP control\par 2.  Will perform another ultrasound of the Abdominal aorta in 3 months \par 3.  If this remains stable, then continue less frequent surveillance\par 4. Check for popliteal aneurysm on 3 months with arterial duplex

## 2021-03-23 ENCOUNTER — APPOINTMENT (OUTPATIENT)
Dept: ELECTROPHYSIOLOGY | Facility: CLINIC | Age: 76
End: 2021-03-23
Payer: MEDICARE

## 2021-03-23 ENCOUNTER — NON-APPOINTMENT (OUTPATIENT)
Age: 76
End: 2021-03-23

## 2021-03-23 PROCEDURE — 93298 REM INTERROG DEV EVAL SCRMS: CPT

## 2021-03-23 PROCEDURE — G2066: CPT

## 2021-04-27 ENCOUNTER — APPOINTMENT (OUTPATIENT)
Dept: ELECTROPHYSIOLOGY | Facility: CLINIC | Age: 76
End: 2021-04-27
Payer: MEDICARE

## 2021-04-27 ENCOUNTER — NON-APPOINTMENT (OUTPATIENT)
Age: 76
End: 2021-04-27

## 2021-04-27 PROCEDURE — G2066: CPT

## 2021-04-27 PROCEDURE — 93298 REM INTERROG DEV EVAL SCRMS: CPT

## 2021-06-01 ENCOUNTER — APPOINTMENT (OUTPATIENT)
Dept: ELECTROPHYSIOLOGY | Facility: CLINIC | Age: 76
End: 2021-06-01
Payer: MEDICARE

## 2021-06-01 ENCOUNTER — NON-APPOINTMENT (OUTPATIENT)
Age: 76
End: 2021-06-01

## 2021-06-01 PROCEDURE — 93298 REM INTERROG DEV EVAL SCRMS: CPT

## 2021-06-01 PROCEDURE — G2066: CPT

## 2021-06-21 ENCOUNTER — OUTPATIENT (OUTPATIENT)
Dept: OUTPATIENT SERVICES | Facility: HOSPITAL | Age: 76
LOS: 1 days | End: 2021-06-21
Payer: MEDICARE

## 2021-06-21 ENCOUNTER — APPOINTMENT (OUTPATIENT)
Dept: ULTRASOUND IMAGING | Facility: CLINIC | Age: 76
End: 2021-06-21
Payer: MEDICARE

## 2021-06-21 DIAGNOSIS — Z98.890 OTHER SPECIFIED POSTPROCEDURAL STATES: Chronic | ICD-10-CM

## 2021-06-21 DIAGNOSIS — I71.4 ABDOMINAL AORTIC ANEURYSM, WITHOUT RUPTURE: ICD-10-CM

## 2021-06-21 PROCEDURE — 76775 US EXAM ABDO BACK WALL LIM: CPT

## 2021-06-21 PROCEDURE — 76775 US EXAM ABDO BACK WALL LIM: CPT | Mod: 26

## 2021-06-21 PROCEDURE — 93925 LOWER EXTREMITY STUDY: CPT | Mod: 26

## 2021-06-21 PROCEDURE — 93925 LOWER EXTREMITY STUDY: CPT

## 2021-06-25 ENCOUNTER — APPOINTMENT (OUTPATIENT)
Dept: CARDIOLOGY | Facility: CLINIC | Age: 76
End: 2021-06-25
Payer: MEDICARE

## 2021-06-25 ENCOUNTER — NON-APPOINTMENT (OUTPATIENT)
Age: 76
End: 2021-06-25

## 2021-06-25 VITALS
SYSTOLIC BLOOD PRESSURE: 121 MMHG | HEIGHT: 69 IN | OXYGEN SATURATION: 98 % | HEART RATE: 58 BPM | WEIGHT: 12 LBS | BODY MASS INDEX: 1.78 KG/M2 | DIASTOLIC BLOOD PRESSURE: 71 MMHG

## 2021-06-25 PROCEDURE — 99214 OFFICE O/P EST MOD 30 MIN: CPT

## 2021-06-25 PROCEDURE — 93000 ELECTROCARDIOGRAM COMPLETE: CPT

## 2021-06-25 NOTE — REASON FOR VISIT
[Follow-Up - Clinic] : a clinic follow-up of [FreeTextEntry1] : Followup visit\par US aorta shows stable saccular aneurysm \par US arterial duplex without pop aneurysm no sig stenosis\par no claudication\par no cp or sob\par LDL was 83 on recent labs\par He is on lipitor 40\par \par No pulsatile abdominal pains or masses\par \par Meds:\par Amlodipine 10\par HCTZ 25\par Lexapro\par Lipitor 40\par Metoprolol 25 daily  \par Plavix 75

## 2021-07-06 ENCOUNTER — NON-APPOINTMENT (OUTPATIENT)
Age: 76
End: 2021-07-06

## 2021-07-06 ENCOUNTER — APPOINTMENT (OUTPATIENT)
Dept: ELECTROPHYSIOLOGY | Facility: CLINIC | Age: 76
End: 2021-07-06
Payer: MEDICARE

## 2021-07-06 ENCOUNTER — APPOINTMENT (OUTPATIENT)
Dept: OTOLARYNGOLOGY | Facility: CLINIC | Age: 76
End: 2021-07-06

## 2021-07-06 PROCEDURE — 93298 REM INTERROG DEV EVAL SCRMS: CPT

## 2021-07-06 PROCEDURE — G2066: CPT

## 2021-07-20 ENCOUNTER — APPOINTMENT (OUTPATIENT)
Dept: OTOLARYNGOLOGY | Facility: CLINIC | Age: 76
End: 2021-07-20
Payer: MEDICARE

## 2021-07-20 VITALS
HEART RATE: 66 BPM | BODY MASS INDEX: 26.96 KG/M2 | SYSTOLIC BLOOD PRESSURE: 121 MMHG | WEIGHT: 182 LBS | HEIGHT: 69 IN | DIASTOLIC BLOOD PRESSURE: 64 MMHG

## 2021-07-20 PROCEDURE — 99213 OFFICE O/P EST LOW 20 MIN: CPT

## 2021-07-20 NOTE — CONSULT LETTER
[Dear  ___] : Dear  [unfilled], [Courtesy Letter:] : I had the pleasure of seeing your patient, [unfilled], in my office today. [Please see my note below.] : Please see my note below. [Sincerely,] : Sincerely, [FreeTextEntry2] : Crista Carroll MD, DDS ([place], [place]) [FreeTextEntry3] : Boris Brooks MD, FACS\par \par    [facility] [place]\par Associate Chair\par    Department of Otolaryngology\par \par Professor\par Otolaryngology & Molecular Medicine\par [employer]\par

## 2021-07-20 NOTE — REASON FOR VISIT
[Subsequent Evaluation] : a subsequent evaluation for [FreeTextEntry2] : follow up for tongue lesion.

## 2021-07-20 NOTE — HISTORY OF PRESENT ILLNESS
[de-identified] : 75 year old male presents for follow up for tongue lesion. Denies discomfort, pain, new growth, sensitivity to cold or hot foods or drinks, dysphagia, odynophagia, dysphonia, dyspnea, or otalgia.COVID 19, Pfizer Vaccine 2/16/2021. Had 11/20 exc bx w Dr Carroll and her e for fu as was a complete exc.

## 2021-07-29 ENCOUNTER — APPOINTMENT (OUTPATIENT)
Dept: VASCULAR SURGERY | Facility: CLINIC | Age: 76
End: 2021-07-29
Payer: MEDICARE

## 2021-07-29 VITALS
HEIGHT: 69 IN | HEART RATE: 63 BPM | DIASTOLIC BLOOD PRESSURE: 72 MMHG | TEMPERATURE: 94.6 F | BODY MASS INDEX: 26.96 KG/M2 | SYSTOLIC BLOOD PRESSURE: 118 MMHG | WEIGHT: 182 LBS

## 2021-07-29 PROCEDURE — 99204 OFFICE O/P NEW MOD 45 MIN: CPT

## 2021-08-10 ENCOUNTER — NON-APPOINTMENT (OUTPATIENT)
Age: 76
End: 2021-08-10

## 2021-08-10 ENCOUNTER — APPOINTMENT (OUTPATIENT)
Dept: ELECTROPHYSIOLOGY | Facility: CLINIC | Age: 76
End: 2021-08-10
Payer: MEDICARE

## 2021-08-10 PROCEDURE — G2066: CPT

## 2021-08-10 PROCEDURE — 93298 REM INTERROG DEV EVAL SCRMS: CPT

## 2021-08-27 ENCOUNTER — OUTPATIENT (OUTPATIENT)
Dept: OUTPATIENT SERVICES | Facility: HOSPITAL | Age: 76
LOS: 1 days | End: 2021-08-27

## 2021-08-27 VITALS
RESPIRATION RATE: 16 BRPM | HEART RATE: 61 BPM | DIASTOLIC BLOOD PRESSURE: 62 MMHG | HEIGHT: 67 IN | SYSTOLIC BLOOD PRESSURE: 126 MMHG | OXYGEN SATURATION: 98 % | TEMPERATURE: 98 F | WEIGHT: 188.05 LBS

## 2021-08-27 DIAGNOSIS — G47.33 OBSTRUCTIVE SLEEP APNEA (ADULT) (PEDIATRIC): ICD-10-CM

## 2021-08-27 DIAGNOSIS — T78.40XA ALLERGY, UNSPECIFIED, INITIAL ENCOUNTER: ICD-10-CM

## 2021-08-27 DIAGNOSIS — I10 ESSENTIAL (PRIMARY) HYPERTENSION: ICD-10-CM

## 2021-08-27 DIAGNOSIS — Z98.890 OTHER SPECIFIED POSTPROCEDURAL STATES: Chronic | ICD-10-CM

## 2021-08-27 DIAGNOSIS — I71.4 ABDOMINAL AORTIC ANEURYSM, WITHOUT RUPTURE: ICD-10-CM

## 2021-08-27 DIAGNOSIS — I63.9 CEREBRAL INFARCTION, UNSPECIFIED: ICD-10-CM

## 2021-08-27 DIAGNOSIS — Z86.79 PERSONAL HISTORY OF OTHER DISEASES OF THE CIRCULATORY SYSTEM: ICD-10-CM

## 2021-08-27 DIAGNOSIS — Z98.890 OTHER SPECIFIED POSTPROCEDURAL STATES: ICD-10-CM

## 2021-08-27 DIAGNOSIS — F32.9 MAJOR DEPRESSIVE DISORDER, SINGLE EPISODE, UNSPECIFIED: ICD-10-CM

## 2021-08-27 LAB
ANION GAP SERPL CALC-SCNC: 12 MMOL/L — SIGNIFICANT CHANGE UP (ref 7–14)
BASOPHILS # BLD AUTO: 0.06 K/UL — SIGNIFICANT CHANGE UP (ref 0–0.2)
BASOPHILS NFR BLD AUTO: 0.5 % — SIGNIFICANT CHANGE UP (ref 0–2)
BLD GP AB SCN SERPL QL: NEGATIVE — SIGNIFICANT CHANGE UP
BUN SERPL-MCNC: 16 MG/DL — SIGNIFICANT CHANGE UP (ref 7–23)
CALCIUM SERPL-MCNC: 10 MG/DL — SIGNIFICANT CHANGE UP (ref 8.4–10.5)
CHLORIDE SERPL-SCNC: 105 MMOL/L — SIGNIFICANT CHANGE UP (ref 98–107)
CO2 SERPL-SCNC: 24 MMOL/L — SIGNIFICANT CHANGE UP (ref 22–31)
CREAT SERPL-MCNC: 0.7 MG/DL — SIGNIFICANT CHANGE UP (ref 0.5–1.3)
EOSINOPHIL # BLD AUTO: 0 K/UL — SIGNIFICANT CHANGE UP (ref 0–0.5)
EOSINOPHIL NFR BLD AUTO: 0 % — SIGNIFICANT CHANGE UP (ref 0–6)
GLUCOSE SERPL-MCNC: 101 MG/DL — HIGH (ref 70–99)
HCT VFR BLD CALC: 44.3 % — SIGNIFICANT CHANGE UP (ref 39–50)
HGB BLD-MCNC: 14.8 G/DL — SIGNIFICANT CHANGE UP (ref 13–17)
IANC: 10.01 K/UL — HIGH (ref 1.5–8.5)
IMM GRANULOCYTES NFR BLD AUTO: 0.7 % — SIGNIFICANT CHANGE UP (ref 0–1.5)
LYMPHOCYTES # BLD AUTO: 1.7 K/UL — SIGNIFICANT CHANGE UP (ref 1–3.3)
LYMPHOCYTES # BLD AUTO: 12.9 % — LOW (ref 13–44)
MCHC RBC-ENTMCNC: 29.8 PG — SIGNIFICANT CHANGE UP (ref 27–34)
MCHC RBC-ENTMCNC: 33.4 GM/DL — SIGNIFICANT CHANGE UP (ref 32–36)
MCV RBC AUTO: 89.1 FL — SIGNIFICANT CHANGE UP (ref 80–100)
MONOCYTES # BLD AUTO: 1.33 K/UL — HIGH (ref 0–0.9)
MONOCYTES NFR BLD AUTO: 10.1 % — SIGNIFICANT CHANGE UP (ref 2–14)
NEUTROPHILS # BLD AUTO: 10.01 K/UL — HIGH (ref 1.8–7.4)
NEUTROPHILS NFR BLD AUTO: 75.8 % — SIGNIFICANT CHANGE UP (ref 43–77)
NRBC # BLD: 0 /100 WBCS — SIGNIFICANT CHANGE UP
NRBC # FLD: 0 K/UL — SIGNIFICANT CHANGE UP
PLATELET # BLD AUTO: 296 K/UL — SIGNIFICANT CHANGE UP (ref 150–400)
POTASSIUM SERPL-MCNC: 3.7 MMOL/L — SIGNIFICANT CHANGE UP (ref 3.5–5.3)
POTASSIUM SERPL-SCNC: 3.7 MMOL/L — SIGNIFICANT CHANGE UP (ref 3.5–5.3)
RBC # BLD: 4.97 M/UL — SIGNIFICANT CHANGE UP (ref 4.2–5.8)
RBC # FLD: 13.6 % — SIGNIFICANT CHANGE UP (ref 10.3–14.5)
RH IG SCN BLD-IMP: POSITIVE — SIGNIFICANT CHANGE UP
SODIUM SERPL-SCNC: 141 MMOL/L — SIGNIFICANT CHANGE UP (ref 135–145)
WBC # BLD: 13.19 K/UL — HIGH (ref 3.8–10.5)
WBC # FLD AUTO: 13.19 K/UL — HIGH (ref 3.8–10.5)

## 2021-08-27 RX ORDER — SODIUM CHLORIDE 9 MG/ML
1000 INJECTION, SOLUTION INTRAVENOUS
Refills: 0 | Status: DISCONTINUED | OUTPATIENT
Start: 2021-09-20 | End: 2021-09-21

## 2021-08-27 RX ORDER — ESCITALOPRAM OXALATE 10 MG/1
1 TABLET, FILM COATED ORAL
Qty: 0 | Refills: 0 | DISCHARGE

## 2021-08-27 RX ORDER — METOPROLOL TARTRATE 50 MG
1 TABLET ORAL
Qty: 0 | Refills: 0 | DISCHARGE

## 2021-08-27 RX ORDER — FAMOTIDINE 10 MG/ML
1 INJECTION INTRAVENOUS
Qty: 0 | Refills: 0 | DISCHARGE

## 2021-08-27 NOTE — H&P PST ADULT - PROBLEM SELECTOR PLAN 1
scheduled endovascular abdominal aneurysm repair with Dr. Paulino on 9/14/2021.  Verbal and written pre-op instructions provided to patient. Patient verbalized understanding and will call surgeons office for revised instructions if surgery is rescheduled.   Continue Pepcid for GI prophylaxis.   Patient given verbal and written instruction with teach back on chlorhexidine shampoo, and the patient verbalized understanding with return demonstration.   Patient aware of need for COVID testing prior to  procedure at a [facility]  and advised to coordinate with surgeon to get tested within 72 hours of procedure.

## 2021-08-27 NOTE — H&P PST ADULT - HISTORY OF PRESENT ILLNESS
75 yo male with history of paroxysmal SVT-loop recorder insitu, CVA 2018-on plavix and small saccular aneurysm presents to PST unit for evaluation prior to scheduled endovascular abdominal aneurysm repair with Dr. Paulino.

## 2021-08-27 NOTE — H&P PST ADULT - NSICDXFAMILYHX_GEN_ALL_CORE_FT
FAMILY HISTORY:  Family history of cerebrovascular accident (CVA)  Family history of MI (myocardial infarction)

## 2021-08-27 NOTE — H&P PST ADULT - ASSESSMENT
75 yo male with history of paroxysmal SVT-loop recorder insitu, CVA 2018-on plavix and small saccular aneurysm presents to PST unit for evaluation prior to scheduled endovascular abdominal aneurysm repair with Dr. Paulino.  77 yo male with history of paroxysmal SVT-loop recorder insitu, CVA 2018-on plavix and small saccular aneurysm presents to PST unit for evaluation prior to scheduled endovascular abdominal aneurysm repair with Dr. Paulino.

## 2021-08-27 NOTE — H&P PST ADULT - NSICDXPASTMEDICALHX_GEN_ALL_CORE_FT
PAST MEDICAL HISTORY:  Cerebrovascular accident (CVA) due to embolism of left middle cerebral artery     H/O abdominal aortic aneurysm     High cholesterol     Hypertension     SVT (supraventricular tachycardia)

## 2021-09-01 PROBLEM — Z86.79 PERSONAL HISTORY OF OTHER DISEASES OF THE CIRCULATORY SYSTEM: Chronic | Status: ACTIVE | Noted: 2021-08-27

## 2021-09-09 ENCOUNTER — OUTPATIENT (OUTPATIENT)
Dept: OUTPATIENT SERVICES | Facility: HOSPITAL | Age: 76
LOS: 1 days | End: 2021-09-09
Payer: MEDICARE

## 2021-09-09 ENCOUNTER — APPOINTMENT (OUTPATIENT)
Dept: CV DIAGNOSITCS | Facility: HOSPITAL | Age: 76
End: 2021-09-09

## 2021-09-09 DIAGNOSIS — I10 ESSENTIAL (PRIMARY) HYPERTENSION: ICD-10-CM

## 2021-09-09 DIAGNOSIS — Z98.890 OTHER SPECIFIED POSTPROCEDURAL STATES: Chronic | ICD-10-CM

## 2021-09-09 PROCEDURE — 93306 TTE W/DOPPLER COMPLETE: CPT | Mod: 26

## 2021-09-09 PROCEDURE — 93306 TTE W/DOPPLER COMPLETE: CPT

## 2021-09-10 ENCOUNTER — NON-APPOINTMENT (OUTPATIENT)
Age: 76
End: 2021-09-10

## 2021-09-13 ENCOUNTER — NON-APPOINTMENT (OUTPATIENT)
Age: 76
End: 2021-09-13

## 2021-09-13 ENCOUNTER — APPOINTMENT (OUTPATIENT)
Dept: ELECTROPHYSIOLOGY | Facility: CLINIC | Age: 76
End: 2021-09-13
Payer: MEDICARE

## 2021-09-13 VITALS
BODY MASS INDEX: 27.25 KG/M2 | WEIGHT: 184 LBS | HEART RATE: 66 BPM | HEIGHT: 69 IN | DIASTOLIC BLOOD PRESSURE: 75 MMHG | SYSTOLIC BLOOD PRESSURE: 126 MMHG | OXYGEN SATURATION: 98 %

## 2021-09-13 PROCEDURE — 99214 OFFICE O/P EST MOD 30 MIN: CPT

## 2021-09-13 PROCEDURE — 93000 ELECTROCARDIOGRAM COMPLETE: CPT | Mod: 59

## 2021-09-13 PROCEDURE — 93290 INTERROG DEV EVAL ICPMS IP: CPT

## 2021-09-13 NOTE — DISCUSSION/SUMMARY
[FreeTextEntry1] : In summary the patient is a pleasant 76-year-old gentleman with a history of cryptogenic stroke who underwent an ILR implantation and was found to have symptomatic SVT despite beta blocker therapy. 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 He was also noted to have inducible typical and atypical atrial flutter which degenerated into atrial fibrillation in the EP study, but none of these arrhythmias have ever been seen clinically on his ILR, and were therefore not targeted for ablation.  Overall he is doing well.  His ILR is at EOL. We discussed the options of leaving it in, removing it or replacing it. Given his CVA and easily induced AF/AFl at EPS I do think it is reasonable to replace the device to allow for continued monitoring. \par \par As far as his upcoming transcatheter AAA repair goes, he is cleared from an EP standpoint. I would recommend to continue norris-procedure metoprolol.

## 2021-09-13 NOTE — HISTORY OF PRESENT ILLNESS
[FreeTextEntry1] : I had the pleasure of seeing your patient Arpit De León today in the arrhythmia clinic of [facility]. As you well know the patient is a delightful 76-year-old gentleman with a history of hypertension, hyperlipidemia and a cerebral embolism. The patient presented to the hospital in March 2018 with neurologic symptoms and was found to have a left MCA territory cerebral embolism. He has made a good recovery with no residual deficits. He was discharged on DAPT (aspirin + clopidogrel), but the aspirin was eventually discontinued. An echocardiogram demonstrated normal left ventricular size and function with an EF of 65% and left atrium of 3.6 cm. He underwent implantation of an implantable loop recorder. On January 16, 2020 he had multiple episodes of chest tightness. The patient reported he was in a casino that day and had been drinking scotch and was dehydrated. He does report some lightheadedness with the episodes. Interrogation of his ILR revealed multiple episodes of narrow complex tachycardia with rates between 160 and 190 beats per minute. He had an additional 42 second episode of February 28 and a 34 second episode on April 4. 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 Right-sided atrial flutter was also inducible during the EPS, which transitioned to left-sided atrial flutter, and then degenerated into atrial fibrillation and required external cardioversion. These arrhythmias were not consistent with the clinical arrhythmias seen on his ILR and the decision was made to not pursue them. In follow-up he was doing well and had no further episodes. He returns to clinic today.\par \par Overall he reports doing well.  He denies any palpitations, lightheadedness, presyncope or syncope.  His blood pressure was 126/75 and his pulse 66 and regular.  He is EKG demonstrated sinus rhythm at 66 bpm with an old inferior MI and poor R wave progression.  Interrogation of his implantable loop recorder reveals it is functioning normally but at EOL. All episodes demonstrates they are false AF determinations (sinus rhythm with APCs and very short runs of AT).   For now I do not think he therefore requires anticoagulation.\par

## 2021-09-15 ENCOUNTER — APPOINTMENT (OUTPATIENT)
Dept: CARDIOLOGY | Facility: CLINIC | Age: 76
End: 2021-09-15
Payer: MEDICARE

## 2021-09-15 ENCOUNTER — NON-APPOINTMENT (OUTPATIENT)
Age: 76
End: 2021-09-15

## 2021-09-15 VITALS
DIASTOLIC BLOOD PRESSURE: 63 MMHG | HEIGHT: 69 IN | OXYGEN SATURATION: 97 % | WEIGHT: 184 LBS | SYSTOLIC BLOOD PRESSURE: 113 MMHG | HEART RATE: 66 BPM | TEMPERATURE: 98.3 F | BODY MASS INDEX: 27.25 KG/M2

## 2021-09-15 PROCEDURE — 99214 OFFICE O/P EST MOD 30 MIN: CPT

## 2021-09-15 NOTE — PHYSICAL EXAM
[General Appearance - Well Developed] : well developed [Normal Appearance] : normal appearance [Well Groomed] : well groomed [General Appearance - Well Nourished] : well nourished [No Deformities] : no deformities [General Appearance - In No Acute Distress] : no acute distress [Normal Conjunctiva] : the conjunctiva exhibited no abnormalities [Eyelids - No Xanthelasma] : the eyelids demonstrated no xanthelasmas [Normal Oral Mucosa] : normal oral mucosa [No Oral Pallor] : no oral pallor [No Oral Cyanosis] : no oral cyanosis [Respiration, Rhythm And Depth] : normal respiratory rhythm and effort [Exaggerated Use Of Accessory Muscles For Inspiration] : no accessory muscle use [Auscultation Breath Sounds / Voice Sounds] : lungs were clear to auscultation bilaterally [Heart Rate And Rhythm] : heart rate and rhythm were normal [Heart Sounds] : normal S1 and S2 [Murmurs] : no murmurs present [Abdomen Soft] : soft [Abdomen Tenderness] : non-tender [Abdomen Mass (___ Cm)] : no abdominal mass palpated [Abnormal Walk] : normal gait [Gait - Sufficient For Exercise Testing] : the gait was sufficient for exercise testing [Nail Clubbing] : no clubbing of the fingernails [Cyanosis, Localized] : no localized cyanosis [Petechial Hemorrhages (___cm)] : no petechial hemorrhages [Oriented To Time, Place, And Person] : oriented to person, place, and time [] : no ischemic changes [Affect] : the affect was normal [Mood] : the mood was normal [No Anxiety] : not feeling anxious

## 2021-09-15 NOTE — ASSESSMENT
[FreeTextEntry1] : Assessment\par 1.  Small, saccular Abdominal aortic aneurysm \par 2. HTN - controlled \par \par Plan:\par 1.  Continue excellent BP control.  Goal BP < 120\par 2.  Llpitor to 80mg daily for goal LDL < 70\par 3.  He is holding plavix for 5 days prior to the procedure.\par 4.  He should continue his HCTZ, metoprolol and amlodipine on the day of the procedure\par 5.  His echo was normal.  BP and HR well controlled.  ECG is non-ischemic.  he has good exercise tolerance.  \par \par He is clear to proceed with endovascular repair of the AAA\par \par 6.  Appreciate Dr. Paulino excellent care with this case\par 7.  Resume plavix post procedure\par 8.  Return in 3 months.

## 2021-09-15 NOTE — REASON FOR VISIT
[Follow-Up - Clinic] : a clinic follow-up of [FreeTextEntry1] : 9/15/2021\par Plans for endovascular intervention of the aortic aneurysm with Dr. Paulino next week\par Echo was normal\par ECG was non-ischemic\par He can climb flights of stairs without any chest pain\par no coronary stents. \par not smoking\par no palpitations.\par Seen by Dr. Day a few days ago, he has a loop recorder.  Might need it replaced.\par \par No changes to his medications\par \par His PCP is Dr. Carmona.  \par \par \par \par Followup visit\par US aorta shows stable saccular aneurysm \par US arterial duplex without pop aneurysm no sig stenosis\par no claudication\par no cp or sob\par LDL was 83 on recent labs\par He is on lipitor 40\par \par No pulsatile abdominal pains or masses\par \par Meds:\par Amlodipine 10\par HCTZ 25\par Lexapro\par Lipitor 40\par Metoprolol 25 daily  \par Plavix 75

## 2021-09-17 ENCOUNTER — APPOINTMENT (OUTPATIENT)
Dept: DISASTER EMERGENCY | Facility: CLINIC | Age: 76
End: 2021-09-17

## 2021-09-17 LAB — SARS-COV-2 N GENE NPH QL NAA+PROBE: NOT DETECTED

## 2021-09-19 ENCOUNTER — TRANSCRIPTION ENCOUNTER (OUTPATIENT)
Age: 76
End: 2021-09-19

## 2021-09-20 ENCOUNTER — APPOINTMENT (OUTPATIENT)
Dept: VASCULAR SURGERY | Facility: HOSPITAL | Age: 76
End: 2021-09-20

## 2021-09-20 ENCOUNTER — INPATIENT (INPATIENT)
Facility: HOSPITAL | Age: 76
LOS: 0 days | Discharge: HOME | End: 2021-09-21
Attending: SURGERY | Admitting: SURGERY
Payer: MEDICARE

## 2021-09-20 VITALS
SYSTOLIC BLOOD PRESSURE: 113 MMHG | OXYGEN SATURATION: 99 % | TEMPERATURE: 98 F | DIASTOLIC BLOOD PRESSURE: 55 MMHG | HEART RATE: 62 BPM | HEIGHT: 67 IN | WEIGHT: 184.09 LBS | RESPIRATION RATE: 13 BRPM

## 2021-09-20 DIAGNOSIS — I71.4 ABDOMINAL AORTIC ANEURYSM, WITHOUT RUPTURE: ICD-10-CM

## 2021-09-20 DIAGNOSIS — Z98.890 OTHER SPECIFIED POSTPROCEDURAL STATES: Chronic | ICD-10-CM

## 2021-09-20 DIAGNOSIS — Z95.818 PRESENCE OF OTHER CARDIAC IMPLANTS AND GRAFTS: Chronic | ICD-10-CM

## 2021-09-20 LAB
GAS PNL BLDA: SIGNIFICANT CHANGE UP
GAS PNL BLDA: SIGNIFICANT CHANGE UP
RH IG SCN BLD-IMP: POSITIVE — SIGNIFICANT CHANGE UP

## 2021-09-20 PROCEDURE — 34713 PERQ ACCESS & CLSR FEM ART: CPT

## 2021-09-20 PROCEDURE — 34702 EVASC RPR A-AO NDGFT RPT: CPT

## 2021-09-20 RX ORDER — ESCITALOPRAM OXALATE 10 MG/1
10 TABLET, FILM COATED ORAL DAILY
Refills: 0 | Status: DISCONTINUED | OUTPATIENT
Start: 2021-09-20 | End: 2021-09-21

## 2021-09-20 RX ORDER — HYDROMORPHONE HYDROCHLORIDE 2 MG/ML
1 INJECTION INTRAMUSCULAR; INTRAVENOUS; SUBCUTANEOUS
Refills: 0 | Status: DISCONTINUED | OUTPATIENT
Start: 2021-09-20 | End: 2021-09-20

## 2021-09-20 RX ORDER — HYDROCHLOROTHIAZIDE 25 MG
25 TABLET ORAL DAILY
Refills: 0 | Status: DISCONTINUED | OUTPATIENT
Start: 2021-09-20 | End: 2021-09-21

## 2021-09-20 RX ORDER — OXYCODONE HYDROCHLORIDE 5 MG/1
10 TABLET ORAL ONCE
Refills: 0 | Status: DISCONTINUED | OUTPATIENT
Start: 2021-09-20 | End: 2021-09-20

## 2021-09-20 RX ORDER — ONDANSETRON 8 MG/1
4 TABLET, FILM COATED ORAL ONCE
Refills: 0 | Status: DISCONTINUED | OUTPATIENT
Start: 2021-09-20 | End: 2021-09-20

## 2021-09-20 RX ORDER — HYDROMORPHONE HYDROCHLORIDE 2 MG/ML
0.5 INJECTION INTRAMUSCULAR; INTRAVENOUS; SUBCUTANEOUS
Refills: 0 | Status: DISCONTINUED | OUTPATIENT
Start: 2021-09-20 | End: 2021-09-20

## 2021-09-20 RX ORDER — FAMOTIDINE 10 MG/ML
20 INJECTION INTRAVENOUS DAILY
Refills: 0 | Status: DISCONTINUED | OUTPATIENT
Start: 2021-09-20 | End: 2021-09-21

## 2021-09-20 RX ORDER — SODIUM CHLORIDE 9 MG/ML
1000 INJECTION, SOLUTION INTRAVENOUS
Refills: 0 | Status: DISCONTINUED | OUTPATIENT
Start: 2021-09-20 | End: 2021-09-20

## 2021-09-20 RX ORDER — AMLODIPINE BESYLATE 2.5 MG/1
10 TABLET ORAL DAILY
Refills: 0 | Status: DISCONTINUED | OUTPATIENT
Start: 2021-09-20 | End: 2021-09-21

## 2021-09-20 RX ORDER — OXYCODONE HYDROCHLORIDE 5 MG/1
5 TABLET ORAL ONCE
Refills: 0 | Status: DISCONTINUED | OUTPATIENT
Start: 2021-09-20 | End: 2021-09-20

## 2021-09-20 RX ORDER — CLOPIDOGREL BISULFATE 75 MG/1
75 TABLET, FILM COATED ORAL DAILY
Refills: 0 | Status: DISCONTINUED | OUTPATIENT
Start: 2021-09-20 | End: 2021-09-21

## 2021-09-20 RX ORDER — METOPROLOL TARTRATE 50 MG
25 TABLET ORAL DAILY
Refills: 0 | Status: DISCONTINUED | OUTPATIENT
Start: 2021-09-20 | End: 2021-09-21

## 2021-09-20 RX ORDER — SODIUM CHLORIDE 9 MG/ML
1000 INJECTION, SOLUTION INTRAVENOUS
Refills: 0 | Status: DISCONTINUED | OUTPATIENT
Start: 2021-09-20 | End: 2021-09-21

## 2021-09-20 RX ORDER — ATORVASTATIN CALCIUM 80 MG/1
80 TABLET, FILM COATED ORAL AT BEDTIME
Refills: 0 | Status: DISCONTINUED | OUTPATIENT
Start: 2021-09-20 | End: 2021-09-21

## 2021-09-20 RX ADMIN — SODIUM CHLORIDE 30 MILLILITER(S): 9 INJECTION, SOLUTION INTRAVENOUS at 22:22

## 2021-09-20 RX ADMIN — SODIUM CHLORIDE 100 MILLILITER(S): 9 INJECTION, SOLUTION INTRAVENOUS at 14:35

## 2021-09-20 RX ADMIN — ATORVASTATIN CALCIUM 80 MILLIGRAM(S): 80 TABLET, FILM COATED ORAL at 21:10

## 2021-09-20 NOTE — BRIEF OPERATIVE NOTE - NSICDXBRIEFPROCEDURE_GEN_ALL_CORE_FT
PROCEDURES:  Endovascular repair of infrarenal abdominal aortic or iliac aneurysm with extension prosthesis 20-Sep-2021 13:51:34  Sidney Schaefer

## 2021-09-20 NOTE — CHART NOTE - NSCHARTNOTEFT_GEN_A_CORE
POST-OPERATIVE NOTE    Subjective:  Patient is s/p endovascular repair of penetrating aortic ulcer via deployment of stent-graft. Recovering appropriately. Denies pain, nausea, vomiting.    Vital Signs Last 24 Hrs  T(C): 36.3 (20 Sep 2021 21:00), Max: 36.7 (20 Sep 2021 15:30)  T(F): 97.4 (20 Sep 2021 21:00), Max: 98.1 (20 Sep 2021 19:00)  HR: 66 (20 Sep 2021 21:00) (53 - 66)  BP: 133/60 (20 Sep 2021 21:00) (103/79 - 148/64)  BP(mean): 72 (20 Sep 2021 20:00) (60 - 108)  RR: 16 (20 Sep 2021 21:00) (10 - 19)  SpO2: 99% (20 Sep 2021 21:00) (94% - 100%)  I&O's Detail    20 Sep 2021 07:01  -  20 Sep 2021 22:37  --------------------------------------------------------  IN:    Lactated Ringers: 500 mL    Oral Fluid: 240 mL  Total IN: 740 mL    OUT:    Voided (mL): 2310 mL  Total OUT: 2310 mL    Total NET: -1570 mL        amLODIPine   Tablet 10  clopidogrel Tablet 75  hydrochlorothiazide 25  metoprolol succinate ER 25    Physical Exam:  General: NAD, resting comfortably in bed  Pulmonary: Nonlabored breathing, no respiratory distress  Abdominal: soft, nontender, nondistended.  Extremities:  Groin incisions bilaterally clean, dry, intact, palpable femoral pulses. B/L palpable PT 2+ and R DP palpable. L DP with strong signal on Doppler      Assessment:  The patient is a 76y Male who is now s s/p endovascular repair of penetrating aortic ulcer via deployment of stent-graft.      Plan:  - Pain control as needed  - IVF  - Regular diet  - SCD for DVT ppx  - Monitor vital signs  - F/u AM labs POST-OPERATIVE NOTE    Subjective:  Patient is s/p endovascular repair of penetrating aortic ulcer via deployment of stent-graft. Recovering appropriately. Denies pain, nausea, vomiting.    Vital Signs Last 24 Hrs  T(C): 36.3 (20 Sep 2021 21:00), Max: 36.7 (20 Sep 2021 15:30)  T(F): 97.4 (20 Sep 2021 21:00), Max: 98.1 (20 Sep 2021 19:00)  HR: 66 (20 Sep 2021 21:00) (53 - 66)  BP: 133/60 (20 Sep 2021 21:00) (103/79 - 148/64)  BP(mean): 72 (20 Sep 2021 20:00) (60 - 108)  RR: 16 (20 Sep 2021 21:00) (10 - 19)  SpO2: 99% (20 Sep 2021 21:00) (94% - 100%)  I&O's Detail    20 Sep 2021 07:01  -  20 Sep 2021 22:37  --------------------------------------------------------  IN:    Lactated Ringers: 500 mL    Oral Fluid: 240 mL  Total IN: 740 mL    OUT:    Voided (mL): 2310 mL  Total OUT: 2310 mL    Total NET: -1570 mL        amLODIPine   Tablet 10  clopidogrel Tablet 75  hydrochlorothiazide 25  metoprolol succinate ER 25    Physical Exam:  General: NAD, resting comfortably in bed  Pulmonary: Nonlabored breathing, no respiratory distress  Abdominal: soft, nontender, nondistended.  Extremities:  Groin incisions bilaterally clean, dry, intact, palpable femoral pulses. B/L palpable PT 2+ and R DP palpable. L DP with strong signal on Doppler      Assessment:  The patient is a 76y Male who is now s s/p endovascular repair of penetrating aortic ulcer via deployment of stent-graft.      Plan:  - Pain control as needed  - IVF  - Regular diet  - Passed TOV  - SCD for DVT ppx  - Monitor vital signs  - F/u AM labs

## 2021-09-20 NOTE — ASU PATIENT PROFILE, ADULT - NSCAFFEAMTFREQ_GEN_ALL_CORE_SD
1-2 cups/cans per day Manual Repair Warning Statement: We plan on removing the manually selected variable below in favor of our much easier automatic structured text blocks found in the previous tab. We decided to do this to help make the flow better and give you the full power of structured data. Manual selection is never going to be ideal in our platform and I would encourage you to avoid using manual selection from this point on, especially since I will be sunsetting this feature. It is important that you do one of two things with the customized text below. First, you can save all of the text in a word file so you can have it for future reference. Second, transfer the text to the appropriate area in the Library tab. Lastly, if there is a flap or graft type which we do not have you need to let us know right away so I can add it in before the variable is hidden. No need to panic, we plan to give you roughly 6 months to make the change.

## 2021-09-21 ENCOUNTER — TRANSCRIPTION ENCOUNTER (OUTPATIENT)
Age: 76
End: 2021-09-21

## 2021-09-21 VITALS
RESPIRATION RATE: 18 BRPM | SYSTOLIC BLOOD PRESSURE: 100 MMHG | TEMPERATURE: 98 F | HEART RATE: 61 BPM | OXYGEN SATURATION: 100 % | DIASTOLIC BLOOD PRESSURE: 61 MMHG

## 2021-09-21 LAB
ANION GAP SERPL CALC-SCNC: 10 MMOL/L — SIGNIFICANT CHANGE UP (ref 7–14)
BASOPHILS # BLD AUTO: 0.04 K/UL — SIGNIFICANT CHANGE UP (ref 0–0.2)
BASOPHILS NFR BLD AUTO: 0.4 % — SIGNIFICANT CHANGE UP (ref 0–2)
BUN SERPL-MCNC: 17 MG/DL — SIGNIFICANT CHANGE UP (ref 7–23)
CALCIUM SERPL-MCNC: 9.7 MG/DL — SIGNIFICANT CHANGE UP (ref 8.4–10.5)
CHLORIDE SERPL-SCNC: 109 MMOL/L — HIGH (ref 98–107)
CO2 SERPL-SCNC: 23 MMOL/L — SIGNIFICANT CHANGE UP (ref 22–31)
COVID-19 SPIKE DOMAIN AB INTERP: POSITIVE
COVID-19 SPIKE DOMAIN ANTIBODY RESULT: 202 U/ML — HIGH
CREAT SERPL-MCNC: 0.63 MG/DL — SIGNIFICANT CHANGE UP (ref 0.5–1.3)
EOSINOPHIL # BLD AUTO: 0 K/UL — SIGNIFICANT CHANGE UP (ref 0–0.5)
EOSINOPHIL NFR BLD AUTO: 0 % — SIGNIFICANT CHANGE UP (ref 0–6)
GLUCOSE SERPL-MCNC: 96 MG/DL — SIGNIFICANT CHANGE UP (ref 70–99)
HCT VFR BLD CALC: 41.5 % — SIGNIFICANT CHANGE UP (ref 39–50)
HGB BLD-MCNC: 14.3 G/DL — SIGNIFICANT CHANGE UP (ref 13–17)
IANC: 8.08 K/UL — SIGNIFICANT CHANGE UP (ref 1.5–8.5)
IMM GRANULOCYTES NFR BLD AUTO: 0.4 % — SIGNIFICANT CHANGE UP (ref 0–1.5)
LYMPHOCYTES # BLD AUTO: 1.72 K/UL — SIGNIFICANT CHANGE UP (ref 1–3.3)
LYMPHOCYTES # BLD AUTO: 15.8 % — SIGNIFICANT CHANGE UP (ref 13–44)
MAGNESIUM SERPL-MCNC: 2 MG/DL — SIGNIFICANT CHANGE UP (ref 1.6–2.6)
MCHC RBC-ENTMCNC: 30.2 PG — SIGNIFICANT CHANGE UP (ref 27–34)
MCHC RBC-ENTMCNC: 34.5 GM/DL — SIGNIFICANT CHANGE UP (ref 32–36)
MCV RBC AUTO: 87.7 FL — SIGNIFICANT CHANGE UP (ref 80–100)
MONOCYTES # BLD AUTO: 0.99 K/UL — HIGH (ref 0–0.9)
MONOCYTES NFR BLD AUTO: 9.1 % — SIGNIFICANT CHANGE UP (ref 2–14)
NEUTROPHILS # BLD AUTO: 8.08 K/UL — HIGH (ref 1.8–7.4)
NEUTROPHILS NFR BLD AUTO: 74.3 % — SIGNIFICANT CHANGE UP (ref 43–77)
NRBC # BLD: 0 /100 WBCS — SIGNIFICANT CHANGE UP
NRBC # FLD: 0 K/UL — SIGNIFICANT CHANGE UP
PHOSPHATE SERPL-MCNC: 2.7 MG/DL — SIGNIFICANT CHANGE UP (ref 2.5–4.5)
PLATELET # BLD AUTO: 240 K/UL — SIGNIFICANT CHANGE UP (ref 150–400)
POTASSIUM SERPL-MCNC: 3.6 MMOL/L — SIGNIFICANT CHANGE UP (ref 3.5–5.3)
POTASSIUM SERPL-SCNC: 3.6 MMOL/L — SIGNIFICANT CHANGE UP (ref 3.5–5.3)
RBC # BLD: 4.73 M/UL — SIGNIFICANT CHANGE UP (ref 4.2–5.8)
RBC # FLD: 13.5 % — SIGNIFICANT CHANGE UP (ref 10.3–14.5)
SARS-COV-2 IGG+IGM SERPL QL IA: 202 U/ML — HIGH
SARS-COV-2 IGG+IGM SERPL QL IA: POSITIVE
SODIUM SERPL-SCNC: 142 MMOL/L — SIGNIFICANT CHANGE UP (ref 135–145)
WBC # BLD: 10.87 K/UL — HIGH (ref 3.8–10.5)
WBC # FLD AUTO: 10.87 K/UL — HIGH (ref 3.8–10.5)

## 2021-09-21 RX ORDER — POTASSIUM CHLORIDE 20 MEQ
40 PACKET (EA) ORAL ONCE
Refills: 0 | Status: COMPLETED | OUTPATIENT
Start: 2021-09-21 | End: 2021-09-21

## 2021-09-21 RX ORDER — POTASSIUM PHOSPHATE, MONOBASIC POTASSIUM PHOSPHATE, DIBASIC 236; 224 MG/ML; MG/ML
15 INJECTION, SOLUTION INTRAVENOUS ONCE
Refills: 0 | Status: DISCONTINUED | OUTPATIENT
Start: 2021-09-21 | End: 2021-09-21

## 2021-09-21 RX ADMIN — Medication 25 MILLIGRAM(S): at 06:47

## 2021-09-21 RX ADMIN — AMLODIPINE BESYLATE 10 MILLIGRAM(S): 2.5 TABLET ORAL at 06:47

## 2021-09-21 RX ADMIN — Medication 40 MILLIEQUIVALENT(S): at 11:13

## 2021-09-21 NOTE — DISCHARGE NOTE PROVIDER - NSDCFUADDINST_GEN_ALL_CORE_FT
WOUND CARE: Please keep incisions clean and dry.  Please do not scrub or rub incisions.  Do not use lotion or powder on incisions.   BATHING: Please do not submerge wound underwater.  You may shower and / or sponge bathe.  ACTIVITY: No heavy lifting or straining.  Otherwise, you may return to your usual level of physical activity.  If you are taking narcotic pain medication (such as Oxycodone or Percocet) DO NOT drive a car, operate machinery or make important decisions.  DIET: Resume a low cholesterol diet as tolerated.  NOTIFY YOUR SURGEON IF: You have any bleeding that does not stop, any pus draining from your wound(s), any fever (over 100.4 F) or chills, persistent nausea / vomiting, persistent diarrhea or if your pain is not controlled on your discharge pain medications.  FOLLOW-UP: Please follow up with your primary care physician in one week regarding your hospitalization.  Please follow-up with your surgeon, Dr. Paulino within 7 days following discharge.  Please call [phone] to schedule an appointment.

## 2021-09-21 NOTE — PROGRESS NOTE ADULT - SUBJECTIVE AND OBJECTIVE BOX
Surgery Progress Note     Subjective/24hour Events:   Patient seen and examined.   No acute events overnight.   Pain controlled.     Vital Signs:  Vital Signs Last 24 Hrs  T(C): 36.4 (21 Sep 2021 01:41), Max: 36.7 (20 Sep 2021 15:30)  T(F): 97.5 (21 Sep 2021 01:41), Max: 98.1 (20 Sep 2021 19:00)  HR: 57 (21 Sep 2021 01:41) (53 - 66)  BP: 103/59 (21 Sep 2021 01:41) (103/59 - 148/64)  BP(mean): 72 (20 Sep 2021 20:00) (60 - 108)  RR: 18 (21 Sep 2021 01:41) (10 - 19)  SpO2: 97% (21 Sep 2021 01:41) (94% - 100%)    CAPILLARY BLOOD GLUCOSE          I&O's Detail    20 Sep 2021 07:01  -  21 Sep 2021 05:53  --------------------------------------------------------  IN:    Lactated Ringers: 500 mL    Oral Fluid: 480 mL  Total IN: 980 mL    OUT:    Voided (mL): 2610 mL  Total OUT: 2610 mL    Total NET: -1630 mL          MEDICATIONS  (STANDING):  amLODIPine   Tablet 10 milliGRAM(s) Oral daily  atorvastatin 80 milliGRAM(s) Oral at bedtime  clopidogrel Tablet 75 milliGRAM(s) Oral daily  escitalopram 10 milliGRAM(s) Oral daily  famotidine    Tablet 20 milliGRAM(s) Oral daily  hydrochlorothiazide 25 milliGRAM(s) Oral daily  lactated ringers. 1000 milliLiter(s) (30 mL/Hr) IV Continuous <Continuous>  lactated ringers. 1000 milliLiter(s) (100 mL/Hr) IV Continuous <Continuous>  metoprolol succinate ER 25 milliGRAM(s) Oral daily    MEDICATIONS  (PRN):      Physical Exam:  General: NAD, resting comfortably in bed  Pulmonary: Nonlabored breathing, no respiratory distress  Abdominal: Soft, nontender, nondistended.  Extremities:  Groin incisions bilaterally clean, dry, intact, palpable femoral pulses. B/L palpable PT pulses present.      Labs:

## 2021-09-21 NOTE — DISCHARGE NOTE PROVIDER - HOSPITAL COURSE
Patient is a 76 year old male with a PMHx of HLD, HTN, paroxysmal SVT (loop recorder insitu), CVA (in 2018 - on Plavix) and small saccular aneurysm who presented to pre-surgical testing for evaluation for endovascular abdominal aneurysm repair.    On 9/20 patient went to the OR for an endovascular repair of penetrating aortic ulcer.  Post operatively patient was advanced to a regular diet, which he tolerated well.    At the time of discharge, the patient was hemodynamically stable, was tolerating PO diet, was voiding urine and passing stool.  He was ambulating and was comfortable with adequate pain control.  The patient was instructed to follow up with Dr. Paulino within 1 week after discharge from the hospital.  The patient / family felt comfortable with discharge.  The patient had no other issues.    Per attending, patient deemed medically stable and cleared for discharge at this time.

## 2021-09-21 NOTE — DISCHARGE NOTE PROVIDER - NSDCCPTREATMENT_GEN_ALL_CORE_FT
PRINCIPAL PROCEDURE  Procedure: Endovascular repair of infrarenal abdominal aortic or iliac aneurysm with extension prosthesis  Findings and Treatment:

## 2021-09-21 NOTE — PROGRESS NOTE ADULT - ASSESSMENT
Mr. De León is a 76 Year-Old Gentleman who is now s status post endovascular repair of penetrating aortic ulcer.     Plan:  - Pain control as needed  - Regular diet.  - Continue plavix.   - SCD for DVT ppx  - Monitor vital signs  - F/u AM labs.- Dispo: Plan for discharge home today.

## 2021-09-21 NOTE — DISCHARGE NOTE PROVIDER - NSDCCPCAREPLAN_GEN_ALL_CORE_FT
PRINCIPAL DISCHARGE DIAGNOSIS  Diagnosis: H/O abdominal aortic aneurysm  Assessment and Plan of Treatment:

## 2021-09-21 NOTE — DISCHARGE NOTE PROVIDER - CARE PROVIDER_API CALL
Michael Paulino)  Surgery; Vascular Surgery  [phone] [place] [address]  Phone: [phone]  Fax: [phone]  Follow Up Time: 1 week

## 2021-09-21 NOTE — DISCHARGE NOTE NURSING/CASE MANAGEMENT/SOCIAL WORK - PATIENT PORTAL LINK FT
You can access the FollowMyHealth Patient Portal offered by [facility] by registering at the following website: [email or web address]. By joining [employer]’s FollowMyHealth portal, you will also be able to view your health information using other applications (apps) compatible with our system.

## 2021-09-21 NOTE — DISCHARGE NOTE PROVIDER - NSDCMRMEDTOKEN_GEN_ALL_CORE_FT
amLODIPine 10 mg oral tablet: 1 tab(s) orally once a day  atorvastatin 80 mg oral tablet: 1 tab(s) orally once a day (at bedtime)  clopidogrel 75 mg oral tablet: 1 tab(s) orally once a day  escitalopram 10 mg oral tablet: 1 tab(s) orally once a day  famotidine 20 mg oral tablet: 1 tab(s) orally once a day  hydroCHLOROthiazide 25 mg oral tablet: 1 tab(s) orally once a day  Metoprolol Succinate ER 25 mg oral tablet, extended release: 1 tab(s) orally once a day

## 2021-10-07 ENCOUNTER — APPOINTMENT (OUTPATIENT)
Dept: VASCULAR SURGERY | Facility: CLINIC | Age: 76
End: 2021-10-07

## 2021-10-07 ENCOUNTER — APPOINTMENT (OUTPATIENT)
Dept: VASCULAR SURGERY | Facility: CLINIC | Age: 76
End: 2021-10-07
Payer: MEDICARE

## 2021-10-07 VITALS
SYSTOLIC BLOOD PRESSURE: 130 MMHG | WEIGHT: 184 LBS | HEIGHT: 69 IN | TEMPERATURE: 93.7 F | DIASTOLIC BLOOD PRESSURE: 75 MMHG | HEART RATE: 60 BPM | BODY MASS INDEX: 27.25 KG/M2

## 2021-10-07 PROCEDURE — 99024 POSTOP FOLLOW-UP VISIT: CPT

## 2021-11-16 ENCOUNTER — APPOINTMENT (OUTPATIENT)
Dept: OTOLARYNGOLOGY | Facility: CLINIC | Age: 76
End: 2021-11-16
Payer: MEDICARE

## 2021-11-16 VITALS
HEIGHT: 69 IN | DIASTOLIC BLOOD PRESSURE: 71 MMHG | WEIGHT: 181 LBS | HEART RATE: 64 BPM | TEMPERATURE: 97.5 F | SYSTOLIC BLOOD PRESSURE: 132 MMHG | BODY MASS INDEX: 26.81 KG/M2

## 2021-11-16 PROCEDURE — 99213 OFFICE O/P EST LOW 20 MIN: CPT

## 2021-11-16 NOTE — CONSULT LETTER
[Dear  ___] : Dear  [unfilled], [Courtesy Letter:] : I had the pleasure of seeing your patient, [unfilled], in my office today. [Please see my note below.] : Please see my note below. [Sincerely,] : Sincerely, [FreeTextEntry2] : Crista Carroll MD, DDS ([place], [place]) [FreeTextEntry3] : Boris Brooks MD, FACS\par \par    [facility] [place]\par Associate Chair\par    Department of Otolaryngology\par \par Professor\par Otolaryngology & Molecular Medicine\par [employer] [facility] [employer]\par

## 2021-11-16 NOTE — HISTORY OF PRESENT ILLNESS
[de-identified] : 76 year male presents for follow up for tongue lesion. Denies discomfort, pain, new growth, sensitivity to cold or hot foods or drinks, dysphagia, odynophagia, dysphonia, dyspnea, or otalgia. Denies fever or recent ear/nose/throat infections COVID vaccination (Phizer) second dose 2/16/2021, completed booster (Phizer) 10/7/2021

## 2021-11-16 NOTE — CONSULT LETTER
[Dear  ___] : Dear  [unfilled], [Courtesy Letter:] : I had the pleasure of seeing your patient, [unfilled], in my office today. [Please see my note below.] : Please see my note below. [Sincerely,] : Sincerely, [FreeTextEntry2] : Crista Carroll MD, DDS ([place], [place]) [FreeTextEntry3] : Boris Brooks MD, FACS\par \par    [facility] [place]\par Associate Chair\par    Department of Otolaryngology\par \par Professor\par Otolaryngology & Molecular Medicine\par [facility] [facility] [employer]\par

## 2021-11-16 NOTE — HISTORY OF PRESENT ILLNESS
[de-identified] : 76 year male presents for follow up for tongue lesion. Denies discomfort, pain, new growth, sensitivity to cold or hot foods or drinks, dysphagia, odynophagia, dysphonia, dyspnea, or otalgia. Denies fever or recent ear/nose/throat infections COVID vaccination (Phizer) second dose 2/16/2021, completed booster (Phizer) 10/7/2021

## 2021-11-29 ENCOUNTER — APPOINTMENT (OUTPATIENT)
Dept: ELECTROPHYSIOLOGY | Facility: CLINIC | Age: 76
End: 2021-11-29

## 2021-12-07 ENCOUNTER — NON-APPOINTMENT (OUTPATIENT)
Age: 76
End: 2021-12-07

## 2021-12-10 ENCOUNTER — OUTPATIENT (OUTPATIENT)
Dept: OUTPATIENT SERVICES | Facility: HOSPITAL | Age: 76
LOS: 1 days | End: 2021-12-10
Payer: MEDICARE

## 2021-12-10 VITALS
HEART RATE: 61 BPM | DIASTOLIC BLOOD PRESSURE: 53 MMHG | SYSTOLIC BLOOD PRESSURE: 118 MMHG | RESPIRATION RATE: 18 BRPM | OXYGEN SATURATION: 95 %

## 2021-12-10 VITALS
OXYGEN SATURATION: 94 % | RESPIRATION RATE: 18 BRPM | TEMPERATURE: 98 F | SYSTOLIC BLOOD PRESSURE: 148 MMHG | HEART RATE: 66 BPM | DIASTOLIC BLOOD PRESSURE: 64 MMHG

## 2021-12-10 DIAGNOSIS — Z98.890 OTHER SPECIFIED POSTPROCEDURAL STATES: Chronic | ICD-10-CM

## 2021-12-10 DIAGNOSIS — I47.1 SUPRAVENTRICULAR TACHYCARDIA: ICD-10-CM

## 2021-12-10 DIAGNOSIS — Z95.818 PRESENCE OF OTHER CARDIAC IMPLANTS AND GRAFTS: Chronic | ICD-10-CM

## 2021-12-10 PROCEDURE — 33285 INSJ SUBQ CAR RHYTHM MNTR: CPT

## 2021-12-10 PROCEDURE — C1764: CPT

## 2021-12-10 PROCEDURE — 33286 RMVL SUBQ CAR RHYTHM MNTR: CPT | Mod: 59

## 2021-12-10 NOTE — ASU DISCHARGE PLAN (ADULT/PEDIATRIC) - CARE PROVIDER_API CALL
Charlene Day (MD)  Cardiac Electrophysiology; Cardiovascular Disease; Internal Medicine  [address] [place] [address]  Phone: [phone]  Fax: [phone]  Established Patient  Scheduled Appointment: 12/27/2021 08:40 AM

## 2021-12-10 NOTE — ASU DISCHARGE PLAN (ADULT/PEDIATRIC) - PROVIDER TOKENS
PROVIDER:[TOKEN:[[id number]],SCHEDULEDAPPT:[12/27/2021],SCHEDULEDAPPTTIME:[08:40 AM],ESTABLISHEDPATIENT:[T]]

## 2021-12-10 NOTE — ASU DISCHARGE PLAN (ADULT/PEDIATRIC) - NS MD DC FALL RISK RISK
For information on Fall & Injury Prevention, visit: [email or web address] OR  [email or web address] OR  https://www.cdc.gov/steadi/patient.html

## 2021-12-11 ENCOUNTER — NON-APPOINTMENT (OUTPATIENT)
Age: 76
End: 2021-12-11

## 2021-12-13 ENCOUNTER — APPOINTMENT (OUTPATIENT)
Dept: ELECTROPHYSIOLOGY | Facility: CLINIC | Age: 76
End: 2021-12-13

## 2021-12-26 ENCOUNTER — RESULT CHARGE (OUTPATIENT)
Age: 76
End: 2021-12-26

## 2021-12-27 ENCOUNTER — NON-APPOINTMENT (OUTPATIENT)
Age: 76
End: 2021-12-27

## 2021-12-27 ENCOUNTER — APPOINTMENT (OUTPATIENT)
Dept: ELECTROPHYSIOLOGY | Facility: CLINIC | Age: 76
End: 2021-12-27
Payer: MEDICARE

## 2021-12-27 VITALS
SYSTOLIC BLOOD PRESSURE: 127 MMHG | OXYGEN SATURATION: 100 % | HEIGHT: 69 IN | HEART RATE: 67 BPM | BODY MASS INDEX: 26.81 KG/M2 | WEIGHT: 181 LBS | DIASTOLIC BLOOD PRESSURE: 79 MMHG

## 2021-12-27 PROCEDURE — 93291 INTERROG DEV EVAL SCRMS IP: CPT

## 2021-12-27 PROCEDURE — 99212 OFFICE O/P EST SF 10 MIN: CPT

## 2021-12-27 PROCEDURE — 93000 ELECTROCARDIOGRAM COMPLETE: CPT | Mod: 59

## 2022-01-28 ENCOUNTER — APPOINTMENT (OUTPATIENT)
Dept: CARDIOLOGY | Facility: CLINIC | Age: 77
End: 2022-01-28
Payer: MEDICARE

## 2022-01-28 ENCOUNTER — NON-APPOINTMENT (OUTPATIENT)
Age: 77
End: 2022-01-28

## 2022-01-28 VITALS
BODY MASS INDEX: 26.66 KG/M2 | HEART RATE: 61 BPM | WEIGHT: 180 LBS | OXYGEN SATURATION: 100 % | SYSTOLIC BLOOD PRESSURE: 135 MMHG | DIASTOLIC BLOOD PRESSURE: 71 MMHG | HEIGHT: 69 IN

## 2022-01-28 VITALS — DIASTOLIC BLOOD PRESSURE: 70 MMHG | SYSTOLIC BLOOD PRESSURE: 128 MMHG

## 2022-01-28 PROCEDURE — 93000 ELECTROCARDIOGRAM COMPLETE: CPT

## 2022-01-28 PROCEDURE — 99214 OFFICE O/P EST MOD 30 MIN: CPT

## 2022-01-28 NOTE — REASON FOR VISIT
[Follow-Up - Clinic] : a clinic follow-up of [FreeTextEntry2] : AAA [FreeTextEntry1] : 1/28/2022\par \par S/p EVAR in 9/2021 with Dr. Paulino. \par S/p loop recorder placement approximately 1.5 months ago. \par Denies C/P or SOB.\par Denies abdominal pain.\par History of chronic back pain, unchanged.\par No groin pain.\par BP is a bit elevated. \par In good spirits.  \par \par Medications:\par Amlodipine 10\par HCTZ 25\par Lexapro\par Lipitor 80\par Metoprolol XL 25 daily  \par Plavix 75\par Famotidine \par \par 9/15/2021\par Plans for endovascular intervention of the aortic aneurysm with Dr. Paulino next week\par Echo was normal\par ECG was non-ischemic\par He can climb flights of stairs without any chest pain\par no coronary stents. \par not smoking\par no palpitations.\par Seen by Dr. Day a few days ago, he has a loop recorder.  Might need it replaced.\par \par No changes to his medications\par \par His PCP is Dr. Carmona.  \par \par \par \par Followup visit\par US aorta shows stable saccular aneurysm \par US arterial duplex without pop aneurysm no sig stenosis\par no claudication\par no cp or sob\par LDL was 83 on recent labs\par He is on lipitor 40\par \par No pulsatile abdominal pains or masses\par \par Meds:\par Amlodipine 10\par HCTZ 25\par Lexapro\par Lipitor 40\par Metoprolol 25 daily  \par Plavix 75

## 2022-01-28 NOTE — ASSESSMENT
[FreeTextEntry1] : Assessment\par 1.  Small, saccular Abdominal aortic aneurysm - s/p EVAR\par 2. HTN - controlled \par \par Plan:\par 1.  Continue excellent BP control.  Goal BP < 120\par 2.  Lipitor to 80mg daily for goal LDL < 70\par 3. Continue plavix\par 4.  He should continue his HCTZ, metoprolol and amlodipine  \par 5. Loop recorder monitoring with Dr. Day \par 6.  Increase exercise, walking, no straining\par 7.  Return in 6 months.

## 2022-02-04 NOTE — DISCHARGE NOTE PROVIDER - NPI NUMBER (FOR SYSADMIN USE ONLY) :
May shower/tub bathe this evening  There may be some bleeding/drainage, normal , may use dry gauze    Followup at screening colonoscopy 2months, Dr Cat Samaniegoodin as prescribed for pain not treated by ibuprofen  High fiber diet with metamucil or konsyl 1 tbsp 1-2x/day, increased hydration noncaffeinated beverages  May use Miralax as needed if no bowel movements in next 24-48hours  Call if any concerns [phone]
[[phone]]

## 2022-02-28 ENCOUNTER — NON-APPOINTMENT (OUTPATIENT)
Age: 77
End: 2022-02-28

## 2022-02-28 ENCOUNTER — APPOINTMENT (OUTPATIENT)
Dept: ELECTROPHYSIOLOGY | Facility: CLINIC | Age: 77
End: 2022-02-28
Payer: MEDICARE

## 2022-02-28 PROCEDURE — 93298 REM INTERROG DEV EVAL SCRMS: CPT

## 2022-02-28 PROCEDURE — G2066: CPT

## 2022-04-04 ENCOUNTER — APPOINTMENT (OUTPATIENT)
Dept: ELECTROPHYSIOLOGY | Facility: CLINIC | Age: 77
End: 2022-04-04
Payer: MEDICARE

## 2022-04-04 ENCOUNTER — NON-APPOINTMENT (OUTPATIENT)
Age: 77
End: 2022-04-04

## 2022-04-04 PROCEDURE — 93298 REM INTERROG DEV EVAL SCRMS: CPT

## 2022-04-04 PROCEDURE — G2066: CPT

## 2022-04-14 ENCOUNTER — APPOINTMENT (OUTPATIENT)
Dept: VASCULAR SURGERY | Facility: CLINIC | Age: 77
End: 2022-04-14
Payer: MEDICARE

## 2022-04-14 VITALS
HEART RATE: 64 BPM | HEIGHT: 69 IN | DIASTOLIC BLOOD PRESSURE: 73 MMHG | BODY MASS INDEX: 26.66 KG/M2 | WEIGHT: 180 LBS | SYSTOLIC BLOOD PRESSURE: 122 MMHG

## 2022-04-14 VITALS — HEART RATE: 64 BPM | TEMPERATURE: 97.9 F | SYSTOLIC BLOOD PRESSURE: 128 MMHG | DIASTOLIC BLOOD PRESSURE: 74 MMHG

## 2022-04-14 PROCEDURE — 93978 VASCULAR STUDY: CPT

## 2022-04-14 PROCEDURE — 99213 OFFICE O/P EST LOW 20 MIN: CPT

## 2022-04-14 NOTE — ASSESSMENT
[FreeTextEntry1] : A/P:\par \par Patient doing well s/p EVAR for penetrating aortic ulcer. Follow up in 6 months for repeat aortoiliac ultrasound. Will also obtain a screening carotid ultrasound at that time.

## 2022-04-14 NOTE — PHYSICAL EXAM
[2+] : left 2+ [Ankle Swelling (On Exam)] : present [Ankle Swelling Bilaterally] : bilaterally  [Calm] : calm [de-identified] : NAD, well appearing  [de-identified] : unlabored [FreeTextEntry1] : Legs warm and appear well perfused [de-identified] : soft, NT
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
## 2022-05-02 ENCOUNTER — APPOINTMENT (OUTPATIENT)
Dept: OTOLARYNGOLOGY | Facility: CLINIC | Age: 77
End: 2022-05-02

## 2022-05-09 ENCOUNTER — RX RENEWAL (OUTPATIENT)
Age: 77
End: 2022-05-09

## 2022-05-10 ENCOUNTER — NON-APPOINTMENT (OUTPATIENT)
Age: 77
End: 2022-05-10

## 2022-05-10 ENCOUNTER — APPOINTMENT (OUTPATIENT)
Dept: ELECTROPHYSIOLOGY | Facility: CLINIC | Age: 77
End: 2022-05-10
Payer: MEDICARE

## 2022-05-10 PROCEDURE — G2066: CPT

## 2022-05-10 PROCEDURE — 93298 REM INTERROG DEV EVAL SCRMS: CPT

## 2022-06-14 ENCOUNTER — APPOINTMENT (OUTPATIENT)
Dept: ELECTROPHYSIOLOGY | Facility: CLINIC | Age: 77
End: 2022-06-14
Payer: MEDICARE

## 2022-06-14 ENCOUNTER — NON-APPOINTMENT (OUTPATIENT)
Age: 77
End: 2022-06-14

## 2022-06-14 PROCEDURE — 93298 REM INTERROG DEV EVAL SCRMS: CPT

## 2022-06-14 PROCEDURE — G2066: CPT

## 2022-06-21 ENCOUNTER — APPOINTMENT (OUTPATIENT)
Dept: OTOLARYNGOLOGY | Facility: CLINIC | Age: 77
End: 2022-06-21
Payer: MEDICARE

## 2022-06-21 VITALS
HEART RATE: 64 BPM | WEIGHT: 175 LBS | SYSTOLIC BLOOD PRESSURE: 123 MMHG | HEIGHT: 68.5 IN | DIASTOLIC BLOOD PRESSURE: 67 MMHG | BODY MASS INDEX: 26.22 KG/M2

## 2022-06-21 PROCEDURE — 99213 OFFICE O/P EST LOW 20 MIN: CPT

## 2022-06-21 NOTE — REASON FOR VISIT
[Subsequent Evaluation] : a subsequent evaluation for [FreeTextEntry2] : follow up for tongue lesion

## 2022-06-21 NOTE — CONSULT LETTER
[Courtesy Letter:] : I had the pleasure of seeing your patient, [unfilled], in my office today. [Please see my note below.] : Please see my note below. [Sincerely,] : Sincerely, [Dear  ___] : Dear  [unfilled], [FreeTextEntry2] : Crista Carroll MD, DDS ([place], [place]) [FreeTextEntry3] : Boris Brooks MD, FACS\par \par [facility] [place]\par Associate Chair\par Department of Otolaryngology\par Professor\par Otolaryngology & Molecular Medicine\par [facility] [employer]\par

## 2022-06-21 NOTE — HISTORY OF PRESENT ILLNESS
[de-identified] : 76 year old male follow up for tongue lesion.  Denies discomfort, pain, new growth, sensitivity to cold or hot foods or drinks, dysphagia, odynophagia, dysphonia, dyspnea, or otalgia.  Denies fever or recent ear/nose/throat infections.\par States had Covid about 1.5 months ago.

## 2022-06-27 ENCOUNTER — APPOINTMENT (OUTPATIENT)
Dept: ELECTROPHYSIOLOGY | Facility: CLINIC | Age: 77
End: 2022-06-27

## 2022-06-27 ENCOUNTER — NON-APPOINTMENT (OUTPATIENT)
Age: 77
End: 2022-06-27

## 2022-06-27 VITALS
SYSTOLIC BLOOD PRESSURE: 128 MMHG | HEIGHT: 68 IN | HEART RATE: 77 BPM | OXYGEN SATURATION: 99 % | BODY MASS INDEX: 26.67 KG/M2 | DIASTOLIC BLOOD PRESSURE: 73 MMHG | WEIGHT: 176 LBS

## 2022-06-27 PROCEDURE — 93285 PRGRMG DEV EVAL SCRMS IP: CPT

## 2022-06-27 PROCEDURE — 99213 OFFICE O/P EST LOW 20 MIN: CPT

## 2022-06-27 PROCEDURE — 93000 ELECTROCARDIOGRAM COMPLETE: CPT | Mod: 59

## 2022-06-27 NOTE — PHYSICAL EXAM
[Well Developed] : well developed [Well Nourished] : well nourished [No Acute Distress] : no acute distress [Normal Conjunctiva] : normal conjunctiva [Normal Venous Pressure] : normal venous pressure [No Carotid Bruit] : no carotid bruit [Normal S1, S2] : normal S1, S2 [No Murmur] : no murmur [No Rub] : no rub [No Gallop] : no gallop [Clear Lung Fields] : clear lung fields [Good Air Entry] : good air entry [No Respiratory Distress] : no respiratory distress  [Soft] : abdomen soft [Non Tender] : non-tender [Normal Bowel Sounds] : normal bowel sounds [Normal Gait] : normal gait [Gait - Sufficient for Exercise Testing] : gait - sufficient for exercise testing [No Edema] : no edema [No Cyanosis] : no cyanosis [No Rash] : no rash [Moves all extremities] : moves all extremities [No Focal Deficits] : no focal deficits [Normal Speech] : normal speech [Alert and Oriented] : alert and oriented [Normal memory] : normal memory

## 2022-06-28 NOTE — DISCUSSION/SUMMARY
[EKG obtained to assist in diagnosis and management of assessed problem(s)] : EKG obtained to assist in diagnosis and management of assessed problem(s) [FreeTextEntry1] : In summary, the patient is a pleasant 76-year-old gentleman with a history of cryptogenic stroke who underwent an ILR implantation and was found to have symptomatic SVT despite beta blocker therapy. 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 He was also noted to have inducible typical and atypical atrial flutter which degenerated into atrial fibrillation in the EP study, but none of these arrhythmias have ever been seen clinically on his ILR, and were therefore not targeted for ablation. He has undergone an ILR explant with a reimplant in December 2021. His ILR continues to show no arrhythmias. We will continue to monitor him via remote monitoring and he will follow up in the clinic in 1 year. \par \par \par I, Dr. Day, personally performed the evaluation and management (E/M) services for this established patient who presents today with an existing condition. That E/M includes conducting the examination, assessing all existing conditions, and establishing a new plan of care. Today, my ACP, Mehnaz Mcgrath PA-C, was here to observe my evaluation and management services for his existing condition to be followed going forward.\par \par \par

## 2022-06-28 NOTE — HISTORY OF PRESENT ILLNESS
[FreeTextEntry1] : I had the pleasure of seeing your patient Arpit De León today in the arrhythmia clinic of [facility]. As you well know the patient is a delightful 76-year-old gentleman with a history of hypertension, hyperlipidemia and a cerebral embolism. The patient presented to the hospital in March 2018 with neurologic symptoms and was found to have a left MCA territory cerebral embolism. He has made a good recovery with no residual deficits. He was discharged on DAPT (aspirin + clopidogrel), but the aspirin was eventually discontinued. An echocardiogram demonstrated normal left ventricular size and function with an EF of 65% and left atrium of 3.6 cm. He underwent implantation of an implantable loop recorder. On January 16, 2020 he had multiple episodes of chest tightness. The patient reported he was in a casino that day and had been drinking scotch and was dehydrated. He does report some lightheadedness with the episodes. Interrogation of his ILR revealed multiple episodes of narrow complex tachycardia with rates between 160 and 190 beats per minute. He had an additional 42 second episode of February 28 and a 34 second episode on April 4. 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 Right-sided atrial flutter was also inducible during the EPS, which transitioned to left-sided atrial flutter, and then degenerated into atrial fibrillation and required external cardioversion. These arrhythmias were not consistent with the clinical arrhythmias seen on his ILR and the decision was made to not pursue them. In follow-up he was doing well and had no further episodes. \par \par At his last visit, his ILR was found to have reached RRT and underwent an ILR explant with reimplant of a new ILR on December 10, 2021. The decision was made given his history of CVA and although clinically never found with atrial arrhythmias, at the time of his EP study and ablation had inducible atrial flutter which degenerated to atrial fibrillation. He also underwent an EVAR in September 2021 for a penetrating aortic ulcer.\par \par Overall he reports doing well.  He denies any palpitations, lightheadedness, presyncope or syncope.  His blood pressure was 128/73 and his pulse 77 and regular. His EKG demonstrated sinus rhythm at 81 bpm with an old inferior MI, poor R wave progression and occasional APC.  Interrogation of his implantable loop recorder reveals it is functioning normally. There are no events on interrogation.\par

## 2022-07-19 ENCOUNTER — APPOINTMENT (OUTPATIENT)
Dept: ELECTROPHYSIOLOGY | Facility: CLINIC | Age: 77
End: 2022-07-19

## 2022-07-19 ENCOUNTER — NON-APPOINTMENT (OUTPATIENT)
Age: 77
End: 2022-07-19

## 2022-07-19 PROCEDURE — 93298 REM INTERROG DEV EVAL SCRMS: CPT

## 2022-07-19 PROCEDURE — G2066: CPT

## 2022-07-29 ENCOUNTER — NON-APPOINTMENT (OUTPATIENT)
Age: 77
End: 2022-07-29

## 2022-07-29 ENCOUNTER — APPOINTMENT (OUTPATIENT)
Dept: CARDIOLOGY | Facility: CLINIC | Age: 77
End: 2022-07-29

## 2022-07-29 VITALS
BODY MASS INDEX: 26.83 KG/M2 | DIASTOLIC BLOOD PRESSURE: 68 MMHG | SYSTOLIC BLOOD PRESSURE: 117 MMHG | HEIGHT: 68 IN | WEIGHT: 177 LBS | HEART RATE: 62 BPM | OXYGEN SATURATION: 99 %

## 2022-07-29 PROCEDURE — 93000 ELECTROCARDIOGRAM COMPLETE: CPT

## 2022-07-29 PROCEDURE — 99214 OFFICE O/P EST MOD 30 MIN: CPT

## 2022-07-29 NOTE — REASON FOR VISIT
[Follow-Up - Clinic] : a clinic follow-up of [FreeTextEntry2] : AAA [FreeTextEntry1] : 7/29/2022\par Doing well\par No chest pain \par no sob.\par feels well\par no changes to his meds\par BP is well controlled\par He walks a bit, lifts light weights at time\par \par \par Medications:\par Amlodipine 10\par HCTZ 25\par Lexapro\par Lipitor 80\par Metoprolol XL 25 daily  \par Plavix 75\par Famotidine \par \par \par 1/28/2022\par \par S/p EVAR in 9/2021 with Dr. Paulino. \par S/p loop recorder placement approximately 1.5 months ago. \par Denies C/P or SOB.\par Denies abdominal pain.\par History of chronic back pain, unchanged.\par No groin pain.\par BP is a bit elevated. \par In good spirits.  \par \par Medications:\par Amlodipine 10\par HCTZ 25\par Lexapro\par Lipitor 80\par Metoprolol XL 25 daily  \par Plavix 75\par Famotidine \par \par 9/15/2021\par Plans for endovascular intervention of the aortic aneurysm with Dr. Paulino next week\par Echo was normal\par ECG was non-ischemic\par He can climb flights of stairs without any chest pain\par no coronary stents. \par not smoking\par no palpitations.\par Seen by Dr. Day a few days ago, he has a loop recorder.  Might need it replaced.\par \par No changes to his medications\par \par His PCP is Dr. Carmona.  \par \par \par \par Followup visit\par US aorta shows stable saccular aneurysm \par US arterial duplex without pop aneurysm no sig stenosis\par no claudication\par no cp or sob\par LDL was 83 on recent labs\par He is on lipitor 40\par \par No pulsatile abdominal pains or masses\par \par Meds:\par Amlodipine 10\par HCTZ 25\par Lexapro\par Lipitor 40\par Metoprolol 25 daily  \par Plavix 75

## 2022-07-29 NOTE — ASSESSMENT
[FreeTextEntry1] : Assessment\par 1.  Small, saccular Abdominal aortic aneurysm - s/p EVAR\par 2. HTN - controlled \par \par Plan:\par 1.  Continue excellent BP control.  Goal BP < 120\par 2.  Lipitor to 80mg daily for goal LDL < 70\par 3. Continue plavix\par 4.  He should continue his HCTZ, metoprolol and amlodipine  \par 5. Loop recorder monitoring with Dr. Day \par 6.  Increase exercise, walking, no straining\par 7.  Will get blood work from his PCP sent to me\par 8.  Return in 1 year.

## 2022-08-02 ENCOUNTER — NON-APPOINTMENT (OUTPATIENT)
Age: 77
End: 2022-08-02

## 2022-08-03 ENCOUNTER — RX RENEWAL (OUTPATIENT)
Age: 77
End: 2022-08-03

## 2022-08-23 ENCOUNTER — NON-APPOINTMENT (OUTPATIENT)
Age: 77
End: 2022-08-23

## 2022-08-23 ENCOUNTER — APPOINTMENT (OUTPATIENT)
Dept: ELECTROPHYSIOLOGY | Facility: CLINIC | Age: 77
End: 2022-08-23

## 2022-08-23 PROCEDURE — G2066: CPT

## 2022-08-23 PROCEDURE — 93298 REM INTERROG DEV EVAL SCRMS: CPT

## 2022-08-23 NOTE — ASU PATIENT PROFILE, ADULT - DATE OF LAST VACCINATION
"  8/22/2022        HPI: Prisca Maynard Leonel John M.D.;  Location: SURGERY Three Rivers Health 25-Feb-2021

## 2022-09-27 ENCOUNTER — NON-APPOINTMENT (OUTPATIENT)
Age: 77
End: 2022-09-27

## 2022-09-27 ENCOUNTER — APPOINTMENT (OUTPATIENT)
Dept: ELECTROPHYSIOLOGY | Facility: CLINIC | Age: 77
End: 2022-09-27

## 2022-09-27 PROCEDURE — G2066: CPT

## 2022-09-27 PROCEDURE — 93298 REM INTERROG DEV EVAL SCRMS: CPT

## 2022-09-27 RX ORDER — ATORVASTATIN CALCIUM 80 MG/1
80 TABLET, FILM COATED ORAL
Qty: 90 | Refills: 3 | Status: ACTIVE | COMMUNITY
Start: 1900-01-01 | End: 1900-01-01

## 2022-10-19 NOTE — END OF VISIT
[Time Spent: ___ minutes] : I have spent [unfilled] minutes of time on the encounter. [Use of Plain Language] : use of plain language

## 2022-10-20 ENCOUNTER — APPOINTMENT (OUTPATIENT)
Dept: VASCULAR SURGERY | Facility: CLINIC | Age: 77
End: 2022-10-20

## 2022-10-20 VITALS — SYSTOLIC BLOOD PRESSURE: 112 MMHG | DIASTOLIC BLOOD PRESSURE: 65 MMHG | HEART RATE: 59 BPM

## 2022-10-20 VITALS
BODY MASS INDEX: 26.37 KG/M2 | HEART RATE: 59 BPM | DIASTOLIC BLOOD PRESSURE: 62 MMHG | HEIGHT: 68 IN | TEMPERATURE: 96.5 F | SYSTOLIC BLOOD PRESSURE: 117 MMHG | WEIGHT: 174 LBS

## 2022-10-20 PROCEDURE — 93880 EXTRACRANIAL BILAT STUDY: CPT

## 2022-10-20 PROCEDURE — 93978 VASCULAR STUDY: CPT

## 2022-10-20 PROCEDURE — 99213 OFFICE O/P EST LOW 20 MIN: CPT

## 2022-11-01 ENCOUNTER — NON-APPOINTMENT (OUTPATIENT)
Age: 77
End: 2022-11-01

## 2022-11-01 ENCOUNTER — APPOINTMENT (OUTPATIENT)
Dept: ELECTROPHYSIOLOGY | Facility: CLINIC | Age: 77
End: 2022-11-01

## 2022-11-01 PROCEDURE — G2066: CPT

## 2022-11-01 PROCEDURE — 93298 REM INTERROG DEV EVAL SCRMS: CPT

## 2022-11-10 NOTE — PHYSICAL EXAM
[2+] : left 2+ [Calm] : calm [de-identified] : NAD, well appearing  [de-identified] : unlabored [FreeTextEntry1] : Legs warm and appear well perfused [de-identified] : soft, NT  [de-identified] : grossly normal

## 2022-11-10 NOTE — ASSESSMENT
[FreeTextEntry1] : A/P:\par \par Patient doing well s/p EVAR for penetrating aortic ulcer. Follow up in 6 months for repeat aortoiliac ultrasound.\par \par Moderate stenosis of the L carotid artery noted today. Patient has history of stroke with mild residual aphasia from 4 years ago. Per reports, CTA carotid normal at the time. Currently he is asymptomatic. Recommend continuing with antiplatelet and statin therapy. Repeat carotid duplex in 6 months.

## 2022-11-10 NOTE — HISTORY OF PRESENT ILLNESS
[FreeTextEntry1] : LAVELL PERLA (: Aug  5 1945) is a 77 year old male who presents today for AAA follow-up.\par \par He is s/p EVAR for a 3.2 cm penetrating aortic ulcer on 2021. A [id number] Cook stent graft cuff was used. Completion angiogram showed satisfactory position of the graft and normal filling of the renal arteries. \par \par Has h/o stroke in 2018 (aphasia) with minimal residual deficits. Per report, CTA at that time with normal carotids. Denies any new focal neurologic deficits including amaurosis fugax, slurred speech, and weakness in the arms/legs. \par \par Today the patient reports feeling well overall. Denies any new issues or interval events. 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 Denies any pain in the chest/abdomen/pelvis/legs. No trouble walking. \par \par PMH: HTN, HLD, stroke in 2018 (aphasia and right sided weakness) without residual, JULIANNE, chronic LBP, cardiac arrhythmia s/p ablation, L knee arthroscopy, AAA s/p EVAR, former smoker\par \par 10/2022  Aortoiliac u/s today demonstrates a patent EVAR without evidence of endoleak. Residual sac measures 2.6 cm. \par \par 10/2022 Screening carotid artery duplex today demonstrates mild disease of the R ICA (66/18) and moderate disease of the L ICA (197/44). Vertebrals are antegrade bilaterally.

## 2022-12-06 ENCOUNTER — APPOINTMENT (OUTPATIENT)
Dept: ELECTROPHYSIOLOGY | Facility: CLINIC | Age: 77
End: 2022-12-06

## 2022-12-06 ENCOUNTER — NON-APPOINTMENT (OUTPATIENT)
Age: 77
End: 2022-12-06

## 2022-12-06 PROCEDURE — 93298 REM INTERROG DEV EVAL SCRMS: CPT

## 2022-12-06 PROCEDURE — G2066: CPT

## 2022-12-09 NOTE — ED ADULT NURSE NOTE - PAIN: PRESENCE, MLM
[address]  coding opportunities       Chart reviewed, no opportunity found: CHART REVIEWED, NO OPPORTUNITY FOUND        Patients Insurance     Medicare Insurance: Medicare
denies pain/discomfort

## 2022-12-20 ENCOUNTER — APPOINTMENT (OUTPATIENT)
Dept: OTOLARYNGOLOGY | Facility: CLINIC | Age: 77
End: 2022-12-20

## 2022-12-20 VITALS
HEART RATE: 78 BPM | HEIGHT: 68 IN | SYSTOLIC BLOOD PRESSURE: 130 MMHG | WEIGHT: 174 LBS | DIASTOLIC BLOOD PRESSURE: 70 MMHG | BODY MASS INDEX: 26.37 KG/M2

## 2022-12-20 PROCEDURE — 99214 OFFICE O/P EST MOD 30 MIN: CPT

## 2022-12-20 RX ORDER — TOBRAMYCIN AND DEXAMETHASONE 3; 1 MG/ML; MG/ML
0.3-0.1 SUSPENSION/ DROPS OPHTHALMIC
Qty: 5 | Refills: 0 | Status: COMPLETED | COMMUNITY
Start: 2022-01-06 | End: 2022-12-20

## 2022-12-20 RX ORDER — FAMOTIDINE 40 MG/1
TABLET, FILM COATED ORAL
Refills: 0 | Status: ACTIVE | COMMUNITY

## 2022-12-20 NOTE — CONSULT LETTER
[Courtesy Letter:] : I had the pleasure of seeing your patient, [unfilled], in my office today. [Please see my note below.] : Please see my note below. [Sincerely,] : Sincerely, [Dear  ___] : Dear  [unfilled], [FreeTextEntry2] : Crista Carroll MD, DDS ([place], [place]) \par  [FreeTextEntry3] : Boris Brooks MD, FACS\par \par [facility] [place]\par Associate Chair\par Department of Otolaryngology\par Professor\par Otolaryngology & Molecular Medicine\par [employer]\par

## 2022-12-20 NOTE — HISTORY OF PRESENT ILLNESS
[de-identified] : 77 year old male old follow up for tongue lesion.  States when he wakes he has blood in his mouth, unsure of source, has been occurring over the past 6 months, possibly as long as a year.  Denies discomfort, pain, new growth, dysphagia, odynophagia, dysphonia, dyspnea, or otalgia.  Denies fever or recent ear/nose/throat infections.\par

## 2023-01-01 NOTE — ASSESSMENT
Problem: Infant Inpatient Plan of Care  Goal: Plan of Care Review  Outcome: Met  Goal: Patient-Specific Goal (Individualized)  Outcome: Met  Goal: Absence of Hospital-Acquired Illness or Injury  Outcome: Met  Goal: Optimal Comfort and Wellbeing  Outcome: Met  Goal: Readiness for Transition of Care  Outcome: Met     Problem: Hypoglycemia ()  Goal: Glucose Stability  Outcome: Met     Problem: Infection ([place])  Goal: Absence of Infection Signs and Symptoms  Outcome: Met     Problem: Oral Nutrition ([place])  Goal: Effective Oral Intake  Outcome: Met     Problem: Infant-Parent Attachment ()  Goal: Demonstration of Attachment Behaviors  Outcome: Met     Problem: Pain ()  Goal: Acceptable Level of Comfort and Activity  Outcome: Met     Problem: Respiratory Compromise ([place])  Goal: Effective Oxygenation and Ventilation  Outcome: Met     Problem: Skin Injury ([place])  Goal: Skin Health and Integrity  Outcome: Met     Problem: Temperature Instability ([place])  Goal: Temperature Stability  Outcome: Met     Problem: Breastfeeding  Goal: Effective Breastfeeding  Outcome: Met      [FreeTextEntry1] : Assessment\par 1.  Small, saccular Abdominal aortic aneurysm \par 2. HTN - controlled \par \par Plan:\par 1.  Continue excellent BP control.  Goal BP < 120\par 2.   Will refer to Dr. Paulino for his opinion regarding the saccular aneurysm of the aorta.  Surveillance vs. covered stent?\par 3.  Will increase lipitor to 80mg daily for goal LDL < 70\par 4.  Continue antiplatelet therapy\par 5.  Followup Dr. Day. \par 6.  Echocardiogram to assess aortic root\par 7.  US aorta in 3 months \par 8. Await Dr. Paulino opinion\par

## 2023-01-10 ENCOUNTER — NON-APPOINTMENT (OUTPATIENT)
Age: 78
End: 2023-01-10

## 2023-01-10 ENCOUNTER — APPOINTMENT (OUTPATIENT)
Dept: ELECTROPHYSIOLOGY | Facility: CLINIC | Age: 78
End: 2023-01-10
Payer: MEDICARE

## 2023-01-10 PROCEDURE — 93298 REM INTERROG DEV EVAL SCRMS: CPT

## 2023-01-10 PROCEDURE — G2066: CPT

## 2023-01-30 NOTE — REASON FOR VISIT
[Initial Evaluation] : an initial evaluation for [Other: _____] : [unfilled] [FreeTextEntry2] : tongue lesion No

## 2023-02-14 ENCOUNTER — NON-APPOINTMENT (OUTPATIENT)
Age: 78
End: 2023-02-14

## 2023-02-14 ENCOUNTER — APPOINTMENT (OUTPATIENT)
Dept: ELECTROPHYSIOLOGY | Facility: CLINIC | Age: 78
End: 2023-02-14
Payer: MEDICARE

## 2023-02-14 PROCEDURE — G2066: CPT

## 2023-02-14 PROCEDURE — 93298 REM INTERROG DEV EVAL SCRMS: CPT

## 2023-03-21 ENCOUNTER — APPOINTMENT (OUTPATIENT)
Dept: ELECTROPHYSIOLOGY | Facility: CLINIC | Age: 78
End: 2023-03-21
Payer: MEDICARE

## 2023-03-21 ENCOUNTER — NON-APPOINTMENT (OUTPATIENT)
Age: 78
End: 2023-03-21

## 2023-03-21 PROCEDURE — 93298 REM INTERROG DEV EVAL SCRMS: CPT

## 2023-03-21 PROCEDURE — G2066: CPT

## 2023-04-25 ENCOUNTER — NON-APPOINTMENT (OUTPATIENT)
Age: 78
End: 2023-04-25

## 2023-04-25 ENCOUNTER — APPOINTMENT (OUTPATIENT)
Dept: ELECTROPHYSIOLOGY | Facility: CLINIC | Age: 78
End: 2023-04-25
Payer: MEDICARE

## 2023-04-25 PROCEDURE — 93298 REM INTERROG DEV EVAL SCRMS: CPT

## 2023-04-25 PROCEDURE — G2066: CPT

## 2023-04-27 ENCOUNTER — APPOINTMENT (OUTPATIENT)
Dept: VASCULAR SURGERY | Facility: CLINIC | Age: 78
End: 2023-04-27
Payer: MEDICARE

## 2023-04-27 VITALS
DIASTOLIC BLOOD PRESSURE: 72 MMHG | BODY MASS INDEX: 26.37 KG/M2 | TEMPERATURE: 97.5 F | HEIGHT: 68 IN | HEART RATE: 52 BPM | WEIGHT: 174 LBS | SYSTOLIC BLOOD PRESSURE: 118 MMHG

## 2023-04-27 VITALS — SYSTOLIC BLOOD PRESSURE: 118 MMHG | DIASTOLIC BLOOD PRESSURE: 73 MMHG | HEART RATE: 60 BPM

## 2023-04-27 PROCEDURE — 93880 EXTRACRANIAL BILAT STUDY: CPT

## 2023-04-27 PROCEDURE — 93978 VASCULAR STUDY: CPT

## 2023-04-27 PROCEDURE — 99212 OFFICE O/P EST SF 10 MIN: CPT

## 2023-05-01 ENCOUNTER — RX RENEWAL (OUTPATIENT)
Age: 78
End: 2023-05-01

## 2023-05-08 NOTE — HISTORY OF PRESENT ILLNESS
[FreeTextEntry1] : LAVELL PERLA (: Aug  5 1945) is a 77 year old male who presents today for follow up evaluation. \par \par He is s/p EVAR for a 3.2 cm penetrating aortic ulcer on 2021. Completion angiogram showed satisfactory position of the graft and normal filling of the renal arteries. \par \par Has h/o stroke in 2018 (aphasia) with no residual deficits. Per report, CTA at that time with normal carotids. Denies any new focal neurologic deficits including amaurosis fugax, slurred speech, and weakness in the arms/legs. He is on Plavix and atorvastatin.\par \par Today the patient reports feeling well overall. Denies any new issues or interval events. 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 Denies any pain in the chest/abdomen/pelvis/legs. No trouble walking. \par \par PMH: HTN, HLD, stroke in 2018 (aphasia and right sided weakness) without residual, JULIANNE, chronic LBP, cardiac arrhythmia s/p ablation, L knee arthroscopy, AAA s/p EVAR, former smoker\par \par 2023\par Aortoiliac u/s today demonstrates a patent EVAR without evidence of endoleak. Residual sac measures 2.6 cm. \par \par 2023\par Carotid artery duplex today demonstrates mild disease of the R ICA and moderate disease of the L ICA (187/41). Vertebrals are antegrade bilaterally.

## 2023-05-08 NOTE — PHYSICAL EXAM
[2+] : left 2+ [Calm] : calm [de-identified] : NAD, well appearing  [FreeTextEntry1] : Legs warm and appear well perfused [de-identified] : unlabored [de-identified] : soft, NT  [de-identified] : grossly normal Cheiloplasty (Complex) Text: A decision was made to reconstruct the defect with a  cheiloplasty.  The defect was undermined extensively.  Additional orbicularis oris muscle was excised with a 15 blade scalpel.  The defect was converted into a full thickness wedge to facilite a better cosmetic result.  Small vessels were then tied off with 5-0 monocyrl. The orbicularis oris, superficial fascia, adipose and dermis were then reapproximated.  After the deeper layers were approximated the epidermis was reapproximated with particular care given to realign the vermilion border.

## 2023-05-08 NOTE — ASSESSMENT
[FreeTextEntry1] : \par A/P:\par \par Patient doing well s/p EVAR for penetrating aortic ulcer. Follow up in 6 months for repeat aortoiliac ultrasound.\par \par Asymptomatic moderate carotid artery disease. History of stroke in 2018 - reportedly negative workup. Continue with medical therapy. Follow up in 6 months for carotid duplex. \par

## 2023-05-25 ENCOUNTER — APPOINTMENT (OUTPATIENT)
Dept: ELECTROPHYSIOLOGY | Facility: CLINIC | Age: 78
End: 2023-05-25
Payer: MEDICARE

## 2023-05-25 ENCOUNTER — NON-APPOINTMENT (OUTPATIENT)
Age: 78
End: 2023-05-25

## 2023-05-25 PROCEDURE — 93298 REM INTERROG DEV EVAL SCRMS: CPT

## 2023-05-25 PROCEDURE — G2066: CPT

## 2023-06-26 ENCOUNTER — NON-APPOINTMENT (OUTPATIENT)
Age: 78
End: 2023-06-26

## 2023-06-26 ENCOUNTER — APPOINTMENT (OUTPATIENT)
Dept: ELECTROPHYSIOLOGY | Facility: CLINIC | Age: 78
End: 2023-06-26
Payer: MEDICARE

## 2023-06-26 VITALS
HEART RATE: 58 BPM | WEIGHT: 173 LBS | BODY MASS INDEX: 26.22 KG/M2 | HEIGHT: 68 IN | DIASTOLIC BLOOD PRESSURE: 69 MMHG | SYSTOLIC BLOOD PRESSURE: 119 MMHG | OXYGEN SATURATION: 97 %

## 2023-06-26 PROCEDURE — 93291 INTERROG DEV EVAL SCRMS IP: CPT

## 2023-06-26 PROCEDURE — 93000 ELECTROCARDIOGRAM COMPLETE: CPT | Mod: 59

## 2023-06-26 PROCEDURE — 99214 OFFICE O/P EST MOD 30 MIN: CPT

## 2023-06-26 RX ORDER — COVID-19 MOLECULAR TEST ASSAY
KIT MISCELLANEOUS
Qty: 8 | Refills: 0 | Status: DISCONTINUED | COMMUNITY
Start: 2022-05-09 | End: 2023-06-26

## 2023-06-26 NOTE — HISTORY OF PRESENT ILLNESS
[FreeTextEntry1] : I had the pleasure of seeing your patient Arpit De León today in the arrhythmia clinic of [facility]. As you well know the patient is a delightful 77-year-old gentleman with a history of hypertension, hyperlipidemia and a cerebral embolism. The patient presented to the hospital in March 2018 with neurologic symptoms and was found to have a left MCA territory cerebral embolism. He has made a good recovery with no residual deficits. An echocardiogram demonstrated normal left ventricular size and function with an EF of 65% and left atrium of 3.6 cm. He underwent implantation of an implantable loop recorder. On January 16, 2020 he had multiple episodes of chest tightness. The patient reported he was in a casino that day and had been drinking scotch and was dehydrated. He does report some lightheadedness with the episodes. Interrogation of his ILR revealed multiple episodes of narrow complex tachycardia with rates between 160 and 190 beats per minute. 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 Right-sided atrial flutter was also inducible during the EPS, which transitioned to left-sided atrial flutter, and then degenerated into atrial fibrillation and required external cardioversion. These arrhythmias were not consistent with the clinical arrhythmias seen on his ILR and the decision was made to not pursue them. In follow-up he was doing well and had no further episodes. His ILR was subsequently replaced when it reached RAJ.\par \par Today in clinic the patient is overall doing quite well.  He has kept busy working and transitioned from [employer] to [employer].  He is able to exercise but not as much as he feels like he should be doing and plans to try and get back in shape.  His blood pressure today was 119/69 his pulse 58 and regular.  His EKG revealed sinus rhythm with APCs at 63 bpm.  Interrogation of his ILR demonstrates no arrhythmic events.\par

## 2023-06-26 NOTE — DISCUSSION/SUMMARY
[EKG obtained to assist in diagnosis and management of assessed problem(s)] : EKG obtained to assist in diagnosis and management of assessed problem(s) [FreeTextEntry1] : In summary, the patient is a pleasant 77-year-old gentleman with a history of cryptogenic stroke who underwent an ILR implantation and was found to have symptomatic SVT despite beta blocker therapy. 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 He was also noted to have inducible typical and atypical atrial flutter which degenerated into atrial fibrillation in the EP study, but none of these arrhythmias have ever been seen clinically on his ILR, and were therefore not targeted for ablation. He has undergone an ILR explant with a reimplant in December 2021. His ILR continues to show no arrhythmias. We will continue to monitor him via remote monitoring and he will follow up in the clinic in 1 year. \par \par \par I, Dr. Day, personally performed the evaluation and management (E/M) services for this established patient who presents today with an existing condition. That E/M includes conducting the examination, assessing all existing conditions, and establishing a new plan of care. Today, my ACP, Mehnaz Mcgrath PA-C, was here to observe my evaluation and management services for his existing condition to be followed going forward.\par \par \par

## 2023-06-28 ENCOUNTER — APPOINTMENT (OUTPATIENT)
Dept: OTOLARYNGOLOGY | Facility: CLINIC | Age: 78
End: 2023-06-28
Payer: MEDICARE

## 2023-06-28 PROCEDURE — 99214 OFFICE O/P EST MOD 30 MIN: CPT

## 2023-06-28 NOTE — PHYSICAL EXAM
[Normal] : no rashes [FreeTextEntry1] : small white lesion w ?? sup ulceration L lat ant oral tongue

## 2023-06-28 NOTE — HISTORY OF PRESENT ILLNESS
[de-identified] : Mr. De León presents for a follow up on left tongue lesion. Reports no new oral lesions. Denies pain,bleeding,burning sensation. No bleeding. 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

## 2023-06-28 NOTE — CONSULT LETTER
[Dear  ___] : Dear  [unfilled], [Courtesy Letter:] : I had the pleasure of seeing your patient, [unfilled], in my office today. [Please see my note below.] : Please see my note below. [Sincerely,] : Sincerely, [FreeTextEntry2] : Crista Carroll MD, DDS ([place], [place]) \par  \par  [FreeTextEntry3] : Boris Brooks MD, FACS\par \par    [facility] [place]\par Associate Chair\par    Department of Otolaryngology\par \par Professor\par Otolaryngology & Molecular Medicine\par [facility] [employer]\par

## 2023-07-27 ENCOUNTER — APPOINTMENT (OUTPATIENT)
Dept: ELECTROPHYSIOLOGY | Facility: CLINIC | Age: 78
End: 2023-07-27
Payer: MEDICARE

## 2023-07-27 ENCOUNTER — NON-APPOINTMENT (OUTPATIENT)
Age: 78
End: 2023-07-27

## 2023-07-27 PROCEDURE — 93298 REM INTERROG DEV EVAL SCRMS: CPT

## 2023-07-27 PROCEDURE — G2066: CPT

## 2023-08-01 ENCOUNTER — APPOINTMENT (OUTPATIENT)
Dept: CARDIOLOGY | Facility: CLINIC | Age: 78
End: 2023-08-01
Payer: MEDICARE

## 2023-08-01 VITALS
OXYGEN SATURATION: 99 % | SYSTOLIC BLOOD PRESSURE: 115 MMHG | HEART RATE: 61 BPM | WEIGHT: 173 LBS | HEIGHT: 68 IN | BODY MASS INDEX: 26.22 KG/M2 | DIASTOLIC BLOOD PRESSURE: 70 MMHG

## 2023-08-01 DIAGNOSIS — Z82.0 FAMILY HISTORY OF EPILEPSY AND OTHER DISEASES OF THE NERVOUS SYSTEM: ICD-10-CM

## 2023-08-01 DIAGNOSIS — I63.9 CEREBRAL INFARCTION, UNSPECIFIED: ICD-10-CM

## 2023-08-01 DIAGNOSIS — Z87.891 PERSONAL HISTORY OF NICOTINE DEPENDENCE: ICD-10-CM

## 2023-08-01 PROCEDURE — 99214 OFFICE O/P EST MOD 30 MIN: CPT

## 2023-08-01 NOTE — REASON FOR VISIT
[Follow-Up - Clinic] : a clinic follow-up of [FreeTextEntry2] : AAA [FreeTextEntry1] : 8/1/23  76 y/o M w/ Ex-Smoker,  PMHX HTN, HLD, CVA in 2018 with residual deficit, PSVT s/p ablation,  EVAR for a 3.2 cm penetrating aortic ulcer on 9/20/2021 with Dr. Paulino, Dysplasia of tongue pre-op tongue surgery on 8/18.   ILR interrogation in 7/2023 showed no events.  Aortoiliac US April demonstrates a patent EVAR without evidence of endoleak. Residual sac measures 2.6 cm.  Carotid artery duplex 4/2023 demonstrates mild disease of the R ICA and moderate disease of the L ICA (187/41). Vertebral arteries are antegrade bilaterally.  No C/P or SOB. No LE pain or edema. No abdominal pain.  Medications: Norvasc 10 mg daily Lipitor 80 mg daily Plavix  HCTZ 25 mg daily Famotidine Escitalopram Toprol XL 25mg daily

## 2023-08-01 NOTE — PHYSICAL EXAM
[General Appearance - Well Developed] : well developed [Normal Appearance] : normal appearance [Well Groomed] : well groomed [General Appearance - Well Nourished] : well nourished [No Deformities] : no deformities [General Appearance - In No Acute Distress] : no acute distress [Normal Conjunctiva] : the conjunctiva exhibited no abnormalities [Eyelids - No Xanthelasma] : the eyelids demonstrated no xanthelasmas [Normal Oral Mucosa] : normal oral mucosa [No Oral Pallor] : no oral pallor [No Oral Cyanosis] : no oral cyanosis [Respiration, Rhythm And Depth] : normal respiratory rhythm and effort [Exaggerated Use Of Accessory Muscles For Inspiration] : no accessory muscle use [Auscultation Breath Sounds / Voice Sounds] : lungs were clear to auscultation bilaterally [Heart Rate And Rhythm] : heart rate and rhythm were normal [Heart Sounds] : normal S1 and S2 [Murmurs] : no murmurs present [Abdomen Soft] : soft [Abdomen Tenderness] : non-tender [Abdomen Mass (___ Cm)] : no abdominal mass palpated [Abnormal Walk] : normal gait [Gait - Sufficient For Exercise Testing] : the gait was sufficient for exercise testing [Nail Clubbing] : no clubbing of the fingernails [Cyanosis, Localized] : no localized cyanosis [Petechial Hemorrhages (___cm)] : no petechial hemorrhages [] : no ischemic changes [FreeTextEntry1] : No LE edema [No Skin Ulcers] : no skin ulcer [Oriented To Time, Place, And Person] : oriented to person, place, and time [Affect] : the affect was normal [Mood] : the mood was normal [No Anxiety] : not feeling anxious

## 2023-08-01 NOTE — ASSESSMENT
[FreeTextEntry1] : Assessment 1. Small, saccular Abdominal aortic aneurysm - s/p EVAR 2. HTN - controlled  3. PSVT s/p ablation 4. Mild to Moderate Carotid Artery Plaque  Plan: 1.  Continue excellent BP control.  Goal BP < 120 2.  Lipitor to 80mg daily for goal LDL < 70 3.  Continue Plavix.      Hold Plavix for 5 whole days prior to tongue surgery.      While off Plavix, take ASA 81 mg daily.      Post tongue surgery, resume Plavix when okay with surgeon.      Once Plavix resumed, stop ASA. 4. He is cardiac cleared to proceed with tongue surgery.      Last Echo with normal LV function. No cardiac symptoms. EKG without ischemia.  5.  Continue current BP regimen.  6. Loop recorder monitoring with Dr. Day. Last interrogation normal.  7. Continue surveillance of AAA s/p EVAR and carotid plaque with Dr. Paulino. 7. Follow-up in 6 months.

## 2023-08-08 ENCOUNTER — OUTPATIENT (OUTPATIENT)
Dept: OUTPATIENT SERVICES | Facility: HOSPITAL | Age: 78
LOS: 1 days | End: 2023-08-08

## 2023-08-08 VITALS
DIASTOLIC BLOOD PRESSURE: 68 MMHG | SYSTOLIC BLOOD PRESSURE: 112 MMHG | RESPIRATION RATE: 16 BRPM | HEIGHT: 67.5 IN | TEMPERATURE: 98 F | OXYGEN SATURATION: 100 % | WEIGHT: 173.94 LBS | HEART RATE: 55 BPM

## 2023-08-08 DIAGNOSIS — Z98.890 OTHER SPECIFIED POSTPROCEDURAL STATES: Chronic | ICD-10-CM

## 2023-08-08 DIAGNOSIS — K13.21 LEUKOPLAKIA OF ORAL MUCOSA, INCLUDING TONGUE: ICD-10-CM

## 2023-08-08 DIAGNOSIS — G47.30 SLEEP APNEA, UNSPECIFIED: ICD-10-CM

## 2023-08-08 DIAGNOSIS — F41.9 ANXIETY DISORDER, UNSPECIFIED: ICD-10-CM

## 2023-08-08 DIAGNOSIS — Z86.73 PERSONAL HISTORY OF TRANSIENT ISCHEMIC ATTACK (TIA), AND CEREBRAL INFARCTION WITHOUT RESIDUAL DEFICITS: ICD-10-CM

## 2023-08-08 DIAGNOSIS — Z95.818 PRESENCE OF OTHER CARDIAC IMPLANTS AND GRAFTS: Chronic | ICD-10-CM

## 2023-08-08 DIAGNOSIS — I10 ESSENTIAL (PRIMARY) HYPERTENSION: ICD-10-CM

## 2023-08-08 LAB
ANION GAP SERPL CALC-SCNC: 11 MMOL/L — SIGNIFICANT CHANGE UP (ref 7–14)
BUN SERPL-MCNC: 16 MG/DL — SIGNIFICANT CHANGE UP (ref 7–23)
CALCIUM SERPL-MCNC: 10.1 MG/DL — SIGNIFICANT CHANGE UP (ref 8.4–10.5)
CHLORIDE SERPL-SCNC: 107 MMOL/L — SIGNIFICANT CHANGE UP (ref 98–107)
CO2 SERPL-SCNC: 26 MMOL/L — SIGNIFICANT CHANGE UP (ref 22–31)
CREAT SERPL-MCNC: 0.69 MG/DL — SIGNIFICANT CHANGE UP (ref 0.5–1.3)
EGFR: 94 ML/MIN/1.73M2 — SIGNIFICANT CHANGE UP
GLUCOSE SERPL-MCNC: 99 MG/DL — SIGNIFICANT CHANGE UP (ref 70–99)
HCT VFR BLD CALC: 43.7 % — SIGNIFICANT CHANGE UP (ref 39–50)
HGB BLD-MCNC: 14.7 G/DL — SIGNIFICANT CHANGE UP (ref 13–17)
MCHC RBC-ENTMCNC: 30.1 PG — SIGNIFICANT CHANGE UP (ref 27–34)
MCHC RBC-ENTMCNC: 33.6 GM/DL — SIGNIFICANT CHANGE UP (ref 32–36)
MCV RBC AUTO: 89.5 FL — SIGNIFICANT CHANGE UP (ref 80–100)
NRBC # BLD: 0 /100 WBCS — SIGNIFICANT CHANGE UP (ref 0–0)
NRBC # FLD: 0 K/UL — SIGNIFICANT CHANGE UP (ref 0–0)
PLATELET # BLD AUTO: 287 K/UL — SIGNIFICANT CHANGE UP (ref 150–400)
POTASSIUM SERPL-MCNC: 3.8 MMOL/L — SIGNIFICANT CHANGE UP (ref 3.5–5.3)
POTASSIUM SERPL-SCNC: 3.8 MMOL/L — SIGNIFICANT CHANGE UP (ref 3.5–5.3)
RBC # BLD: 4.88 M/UL — SIGNIFICANT CHANGE UP (ref 4.2–5.8)
RBC # FLD: 13.9 % — SIGNIFICANT CHANGE UP (ref 10.3–14.5)
SODIUM SERPL-SCNC: 144 MMOL/L — SIGNIFICANT CHANGE UP (ref 135–145)
WBC # BLD: 6.89 K/UL — SIGNIFICANT CHANGE UP (ref 3.8–10.5)
WBC # FLD AUTO: 6.89 K/UL — SIGNIFICANT CHANGE UP (ref 3.8–10.5)

## 2023-08-08 RX ORDER — METOPROLOL TARTRATE 50 MG
1 TABLET ORAL
Qty: 0 | Refills: 0 | DISCHARGE

## 2023-08-08 RX ORDER — FAMOTIDINE 10 MG/ML
1 INJECTION INTRAVENOUS
Qty: 0 | Refills: 0 | DISCHARGE

## 2023-08-08 RX ORDER — HYDROCHLOROTHIAZIDE 25 MG
1 TABLET ORAL
Qty: 0 | Refills: 0 | DISCHARGE

## 2023-08-08 RX ORDER — CLOPIDOGREL BISULFATE 75 MG/1
1 TABLET, FILM COATED ORAL
Qty: 0 | Refills: 0 | DISCHARGE

## 2023-08-08 RX ORDER — AMLODIPINE BESYLATE 2.5 MG/1
1 TABLET ORAL
Qty: 0 | Refills: 0 | DISCHARGE

## 2023-08-08 RX ORDER — ESCITALOPRAM OXALATE 10 MG/1
1 TABLET, FILM COATED ORAL
Qty: 0 | Refills: 0 | DISCHARGE

## 2023-08-08 RX ORDER — ATORVASTATIN CALCIUM 80 MG/1
1 TABLET, FILM COATED ORAL
Qty: 0 | Refills: 0 | DISCHARGE

## 2023-08-08 NOTE — H&P PST ADULT - ASSESSMENT
77 y/o male with preop diagnosis of leukoplakia of oral mucosa including tongue presents to PST preop for left excision of tongue lesion. Pt s/p excision and biopsy of left lateral tongue lesion in November 2020 which revealed severe dysplasia. He was seen by ENT and a new white lesion was seen at old excision site.

## 2023-08-08 NOTE — H&P PST ADULT - NSICDXPASTSURGICALHX_GEN_ALL_CORE_FT
PAST SURGICAL HISTORY:  S/P ablation of ventricular arrhythmia     S/P left knee arthroscopy     Status post endovascular aneurysm repair (EVAR)     Status post placement of implantable loop recorder

## 2023-08-08 NOTE — H&P PST ADULT - NSICDXPASTMEDICALHX_GEN_ALL_CORE_FT
PAST MEDICAL HISTORY:  Anxiety and depression     Asymptomatic bilateral carotid artery stenosis     Cerebrovascular accident (CVA) due to embolism of left middle cerebral artery     Chronic lower back pain     Dysplasia of tongue     H/O abdominal aortic aneurysm     High cholesterol     History of aphasia     Hypertension     Leukoplakia of oral mucosa, including tongue     Mild obstructive sleep apnea     Penetrating ulcer of aorta     SVT (supraventricular tachycardia)

## 2023-08-08 NOTE — H&P PST ADULT - NEGATIVE ENMT SYMPTOMS
no hearing difficulty/no ear pain/no tinnitus/no vertigo/no sinus symptoms/no nasal congestion/no nose bleeds/no abnormal taste sensation/no dry mouth/no throat pain/no dysphagia

## 2023-08-08 NOTE — H&P PST ADULT - ENMT COMMENTS
preop dx of leukoplakia of oral mucosa, including tongue. see HPI preop dx of leukoplakia of oral mucosa including tongue. see HPI

## 2023-08-08 NOTE — H&P PST ADULT - PROBLEM SELECTOR PLAN 3
Pt was instructed by his cardiologist to hold Plavix 5 days prior to surgery. While off Plavix he is to take ASA 81mg as per cardiologist instructions  Cardiac evaluation in chart with EKG and ECHO

## 2023-08-08 NOTE — H&P PST ADULT - PROBLEM SELECTOR PLAN 1
preop for left excision of tongue lesion on 8/13/23  preop instructions given, pt verbalized understanding  pt will take prescribed famotidine AM of surgery for GI prophylaxis  cbc, bmp pending

## 2023-08-17 ENCOUNTER — TRANSCRIPTION ENCOUNTER (OUTPATIENT)
Age: 78
End: 2023-08-17

## 2023-08-17 NOTE — ASU PATIENT PROFILE, ADULT - FALL HARM RISK - UNIVERSAL INTERVENTIONS
Bed in lowest position, wheels locked, appropriate side rails in place/Call bell, personal items and telephone in reach/Instruct patient to call for assistance before getting out of bed or chair/Non-slip footwear when patient is out of bed/[place] to call system/Physically safe environment - no spills, clutter or unnecessary equipment/Purposeful Proactive Rounding/Room/bathroom lighting operational, light cord in reach

## 2023-08-18 ENCOUNTER — TRANSCRIPTION ENCOUNTER (OUTPATIENT)
Age: 78
End: 2023-08-18

## 2023-08-18 ENCOUNTER — APPOINTMENT (OUTPATIENT)
Dept: OTOLARYNGOLOGY | Facility: HOSPITAL | Age: 78
End: 2023-08-18

## 2023-08-18 ENCOUNTER — RESULT REVIEW (OUTPATIENT)
Age: 78
End: 2023-08-18

## 2023-08-18 ENCOUNTER — OUTPATIENT (OUTPATIENT)
Dept: OUTPATIENT SERVICES | Facility: HOSPITAL | Age: 78
LOS: 1 days | Discharge: HOME | End: 2023-08-18
Payer: MEDICARE

## 2023-08-18 VITALS
HEIGHT: 67.5 IN | RESPIRATION RATE: 16 BRPM | HEART RATE: 77 BPM | DIASTOLIC BLOOD PRESSURE: 88 MMHG | WEIGHT: 173.94 LBS | TEMPERATURE: 99 F | SYSTOLIC BLOOD PRESSURE: 132 MMHG | OXYGEN SATURATION: 100 %

## 2023-08-18 VITALS
HEART RATE: 67 BPM | TEMPERATURE: 97 F | SYSTOLIC BLOOD PRESSURE: 114 MMHG | OXYGEN SATURATION: 97 % | DIASTOLIC BLOOD PRESSURE: 54 MMHG | RESPIRATION RATE: 16 BRPM

## 2023-08-18 DIAGNOSIS — Z98.890 OTHER SPECIFIED POSTPROCEDURAL STATES: Chronic | ICD-10-CM

## 2023-08-18 DIAGNOSIS — Z95.818 PRESENCE OF OTHER CARDIAC IMPLANTS AND GRAFTS: Chronic | ICD-10-CM

## 2023-08-18 DIAGNOSIS — K13.21 LEUKOPLAKIA OF ORAL MUCOSA, INCLUDING TONGUE: ICD-10-CM

## 2023-08-18 PROCEDURE — 41112 EXCISION OF TONGUE LESION: CPT | Mod: GC,LT

## 2023-08-18 PROCEDURE — 88305 TISSUE EXAM BY PATHOLOGIST: CPT | Mod: 26

## 2023-08-18 RX ORDER — HYDROCHLOROTHIAZIDE 25 MG
1 TABLET ORAL
Refills: 0 | DISCHARGE

## 2023-08-18 RX ORDER — METOPROLOL TARTRATE 50 MG
1 TABLET ORAL
Refills: 0 | DISCHARGE

## 2023-08-18 RX ORDER — AMLODIPINE BESYLATE 2.5 MG/1
1 TABLET ORAL
Refills: 0 | DISCHARGE

## 2023-08-18 RX ORDER — FAMOTIDINE 10 MG/ML
1 INJECTION INTRAVENOUS
Refills: 0 | DISCHARGE

## 2023-08-18 RX ORDER — FENTANYL CITRATE 50 UG/ML
25 INJECTION INTRAVENOUS
Refills: 0 | Status: DISCONTINUED | OUTPATIENT
Start: 2023-08-18 | End: 2023-08-18

## 2023-08-18 RX ORDER — ATORVASTATIN CALCIUM 80 MG/1
1 TABLET, FILM COATED ORAL
Refills: 0 | DISCHARGE

## 2023-08-18 RX ORDER — ESCITALOPRAM OXALATE 10 MG/1
1 TABLET, FILM COATED ORAL
Refills: 0 | DISCHARGE

## 2023-08-18 RX ORDER — OXYCODONE HYDROCHLORIDE 5 MG/1
1 TABLET ORAL
Qty: 16 | Refills: 0
Start: 2023-08-18 | End: 2023-08-21

## 2023-08-18 RX ORDER — CLOPIDOGREL BISULFATE 75 MG/1
1 TABLET, FILM COATED ORAL
Refills: 0 | DISCHARGE

## 2023-08-18 RX ORDER — ASPIRIN/CALCIUM CARB/MAGNESIUM 324 MG
1 TABLET ORAL
Refills: 0 | DISCHARGE

## 2023-08-18 NOTE — ASU DISCHARGE PLAN (ADULT/PEDIATRIC) - NS MD DC FALL RISK RISK
For information on Fall & Injury Prevention, visit: [email or web address] OR  [email or web address] OR  https://www.cdc.gov/steadi/patient.html

## 2023-08-18 NOTE — ASU DISCHARGE PLAN (ADULT/PEDIATRIC) - CARE PROVIDER_API CALL
Boris Brooks  Head/Neck Surgery  [address] [place] [address]  Phone: [phone]  Fax: [phone]  Follow Up Time:

## 2023-08-18 NOTE — ASU PREOP CHECKLIST - WEIGHT IN KG
Progress Note    Patient: Jimena Pond MRN: [id number]  CSN: [id number]    YOB: 1962  Age: 60 y.o. Sex: female    DOA: 5/27/2021 LOS:  LOS: 9 days                    Subjective:     Primary Rehab Diagnosis: Impaired Mobility and ADLs secondary to Acute Lacunar Stroke (acute lacunar infarct in the posterior left lentiform nucleus extending into the corona radiata) with residual right hemiparesis    No acute patient or nursing concerns. Review of systems  General: No fevers or chills. Cardiovascular: No chest pain or pressure. No palpitations. Pulmonary: No shortness of breath, cough or wheeze. Gastrointestinal: No abdominal pain, nausea, vomiting or diarrhea.  +constipation, reports last BM was 6 days ago  Genitourinary: No urinary frequency, urgency, hesitancy or dysuria. Musculoskeletal: No joint or muscle pain, no back pain, no recent trauma. Neurologic:  right hemiparesis    Objective:     Physical Exam:  Visit Vitals  /79 (BP 1 Location: Left upper arm, BP Patient Position: Supine)   Pulse 78   Temp 97.9 °F (36.6 °C)   Resp 20   Ht 5' 7\" (1.702 m)   Wt 84.4 kg (186 lb)   SpO2 99%   Breastfeeding No   BMI 29.13 kg/m²        General:         Alert, cooperative, no acute distress    HEENT: NC, Atraumatic. PERRLA, anicteric sclerae. Lungs: CTA Bilaterally. No Wheezing/Rhonchi/Rales. Heart:  Regular  rhythm,  No murmur, No Rubs, No Gallops  Abdomen: Soft, Non distended, Non tender.  +Bowel sounds, no HSM  Extremities: No edema  Psych:   Not anxious or agitated. Neurologic:  The patient is awake, alert and oriented x3, able to answer questions fairly appropriately, able to follow 1 and 2 step commands.   Cranial nerves II-XII are grossly intact.  No gross sensory deficit.  Motor strength is 4 to 4+/5 on the RUE and RLE, 5/5 on the LUE and LLE. Intake and Output:  Current Shift:  No intake/output data recorded.   Last three shifts:  No intake/output data recorded. Labs: Results:       Chemistry Recent Labs     06/05/21  0609 06/04/21  0559 06/03/21  0516   * 99 96    138 140   K 4.8 4.9 4.9    108 110   CO2 23 24 22   BUN 40* 36* 34*   CREA 2.85* 2.45* 2.24*   CA 9.1 9.7 9.7   AGAP 7 6 8   BUCR 14 15 15   ALB 3.2* 3.4 3.3*      CBC w/Diff Recent Labs     06/03/21  0516   HGB 14.1   HCT 43.5         Cardiac Enzymes No results for input(s): CPK, CKND1, GABBY in the last 72 hours. No lab exists for component: CKRMB, TROIP   Coagulation No results for input(s): PTP, INR, APTT, INREXT in the last 72 hours. Lipid Panel Lab Results   Component Value Date/Time    Cholesterol, total 188 06/25/2016 12:00 AM    HDL Cholesterol 30 (L) 06/25/2016 12:00 AM    LDL, calculated 106 (H) 06/25/2016 12:00 AM    VLDL, calculated 52 (H) 06/25/2016 12:00 AM    Triglyceride 260 (H) 06/25/2016 12:00 AM    CHOL/HDL Ratio 5.6 (H) 06/11/2015 09:01 AM      BNP No results for input(s): BNPP in the last 72 hours.    Liver Enzymes Recent Labs     06/05/21  0609   ALB 3.2*      Thyroid Studies Lab Results   Component Value Date/Time    TSH 0.942 06/25/2016 12:00 AM          Procedures/imaging: see electronic medical records for all procedures/Xrays and details which were not copied into this note but were reviewed prior to creation of Plan    Medications:   Current Facility-Administered Medications   Medication Dose Route Frequency    losartan (COZAAR) tablet 12.5 mg  12.5 mg Oral DAILY    polyethylene glycol (MIRALAX) packet 17 g  17 g Oral DAILY    senna-docusate (PERICOLACE) 8.6-50 mg per tablet 2 Tablet  2 Tablet Oral PCD    metoprolol tartrate (LOPRESSOR) tablet 25 mg  25 mg Oral Q12H    cholecalciferol (VITAMIN D3) capsule 5,000 Units  5,000 Units Oral DAILY    hydrALAZINE (APRESOLINE) tablet 10 mg  10 mg Oral TID PRN    nicotine (NICODERM CQ) 14 mg/24 hr patch 1 Patch  1 Patch TransDERmal DAILY    acetaminophen (TYLENOL) tablet 650 mg  650 mg Oral Q4H PRN    bisacodyL (DULCOLAX) tablet 10 mg  10 mg Oral Q48H PRN    heparin (porcine) injection 5,000 Units  5,000 Units SubCUTAneous Q8H    aspirin chewable tablet 81 mg  81 mg Oral DAILY WITH DINNER    atorvastatin (LIPITOR) tablet 80 mg  80 mg Oral DAILY    clopidogreL (PLAVIX) tablet 75 mg  75 mg Oral DAILY WITH BREAKFAST    co-enzyme Q-10 (CO Q-10) capsule 100 mg  100 mg Oral DAILY    melatonin tablet 3 mg  3 mg Oral QHS       Assessment/Plan     Principal Problem:    Acute lacunar stroke ([facility] [address] (5/21/2021)      Overview: Acute Lacunar Stroke (acute lacunar infarct in the posterior left       lentiform nucleus extending into the corona radiata) with residual right       hemiparesis    Active Problems:    Impaired mobility and ADLs (5/21/2021)      Hemiparesis of right dominant side due to acute cerebrovascular disease ([facility] [address] (5/21/2021)      Current use of aspirin (5/23/2021)      On clopidogrel therapy (5/23/2021)      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 (HCC) ()      Mixed hyperlipidemia ()      On statin therapy due to risk of future cardiovascular event (5/22/2021)      Overview: On Atorvastatin      Constipation ()      High serum magnesium (5/28/2021)      Secondary hyperparathyroidism of renal origin ([facility] [address] (5/31/2021)      Vitamin D deficiency (5/31/2021)      Overview: Vitamin D 25-Hydroxy (5/31/2021) = 10.5       Acute Lacunar Stroke (acute lacunar infarct in the posterior left lentiform nucleus extending into the corona radiata) with residual right hemiparesis  Continue:                            > Aspirin 81 PO once daily with dinner (STOP DATE: 6/12/2021)                          > Atorvastatin 40 mg PO once daily                          > Clopidogrel 75 mg PO once daily with breakfast                            > Melatonin 3 mg PO q HS     Constipation  Continue miralax, pericolace.   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 Savannah Gates) called on 5/31/2021 for evaluation and comanagement  On 6/2/2021 Discontinued IVF: 0.9%  ml/hr x 1 liter once daily after dinner and Started Losartan 12.5 mg PO once daily (9AM)  On 6/5/21 noted BUN and Cr are trending upwards. Discussed with Dr. Kurt Sanchez, agrees with IVF hydration, recommended to start NS @75ml/hr can hold for therapy, stop losartan for now and can use hydralazine PRN sbp >160, recheck bmp in AM to monitor renal function.   Patient received losartan dose this AM     Tobacco use disorder  Continue Nicotine 14 mg/24 hr patch, 1 patch on skin once daily     Vitamin D Deficiency  Continue Cholecalciferol 5,000 units PO once daily    Guillermo Edgar MD  6/5/2021 9:35pm 78.9

## 2023-08-18 NOTE — ASU DISCHARGE PLAN (ADULT/PEDIATRIC) - CALL YOUR DOCTOR IF YOU HAVE ANY OF THE FOLLOWING:
Swelling that gets worse/Pain not relieved by Medications/Fever greater than (need to indicate Fahrenheit or Celsius) Swelling that gets worse/Pain not relieved by Medications/Fever greater than (need to indicate Fahrenheit or Celsius)/Nausea and vomiting that does not stop/Unable to urinate/Inability to tolerate liquids or foods

## 2023-08-18 NOTE — ASU DISCHARGE PLAN (ADULT/PEDIATRIC) - FOLLOW UP APPOINTMENTS
may also call Recovery Room (PACU) 24/7 @ [phone]/[facility]

## 2023-08-18 NOTE — ASU PREOP CHECKLIST - COMMENTS
sip of water baby asa, metopropol amldpine, ascitapram sip of water baby asa, metoprolol amlodipine, escitalopram

## 2023-08-18 NOTE — ASU DISCHARGE PLAN (ADULT/PEDIATRIC) - ASU DC SPECIAL INSTRUCTIONSFT
Take oxycodone for pain as needed every 6 hours.    Please call clinic or come to ED if worsening tongue swelling, bleeding that does not stop or issues with breathing.    Take medrol dosepak as prescribed.    Resume aspirin on Sunday and plavix on Monday.    Warning Signs:  Please call your doctor or nurse practitioner if you experience the following:  *You experience new chest pain, pressure, squeezing or tightness.  *New or worsening cough, shortness of breath, or wheeze.  *If you are vomiting and cannot keep down fluids or your medications.  *You are getting dehydrated due to continued vomiting, diarrhea, or other reasons. Signs of dehydration include dry mouth, rapid heartbeat, or feeling dizzy or faint when standing.  *You experience burning when you urinate, have blood in your urine, or experience a discharge.  *Your pain is not improving within 8-12 hours or is not gone within 24 hours.  *You have shaking chills, or fever greater than 101.5 degrees Fahrenheit or 38 degrees Celsius.  *Any change in your symptoms, or any new symptoms that concern you.

## 2023-08-22 PROBLEM — K13.21 LEUKOPLAKIA OF ORAL MUCOSA, INCLUDING TONGUE: Chronic | Status: ACTIVE | Noted: 2023-08-08

## 2023-08-22 PROBLEM — G47.33 OBSTRUCTIVE SLEEP APNEA (ADULT) (PEDIATRIC): Chronic | Status: ACTIVE | Noted: 2023-08-08

## 2023-08-22 PROBLEM — Z87.898 PERSONAL HISTORY OF OTHER SPECIFIED CONDITIONS: Chronic | Status: ACTIVE | Noted: 2023-08-08

## 2023-08-22 PROBLEM — K14.9 DISEASE OF TONGUE, UNSPECIFIED: Chronic | Status: ACTIVE | Noted: 2023-08-08

## 2023-08-22 PROBLEM — I71.9 AORTIC ANEURYSM OF UNSPECIFIED SITE, WITHOUT RUPTURE: Chronic | Status: ACTIVE | Noted: 2023-08-08

## 2023-08-22 PROBLEM — M54.50 LOW BACK PAIN, UNSPECIFIED: Chronic | Status: ACTIVE | Noted: 2023-08-08

## 2023-08-22 PROBLEM — I65.23 OCCLUSION AND STENOSIS OF BILATERAL CAROTID ARTERIES: Chronic | Status: ACTIVE | Noted: 2023-08-08

## 2023-08-22 PROBLEM — F41.9 ANXIETY DISORDER, UNSPECIFIED: Chronic | Status: ACTIVE | Noted: 2023-08-08

## 2023-08-23 LAB — SURGICAL PATHOLOGY STUDY: SIGNIFICANT CHANGE UP

## 2023-08-28 ENCOUNTER — NON-APPOINTMENT (OUTPATIENT)
Age: 78
End: 2023-08-28

## 2023-08-28 ENCOUNTER — APPOINTMENT (OUTPATIENT)
Dept: ELECTROPHYSIOLOGY | Facility: CLINIC | Age: 78
End: 2023-08-28
Payer: MEDICARE

## 2023-08-28 NOTE — ED PROVIDER NOTE - CARDIOVASCULAR [-], MLM
[FreeTextEntry1] : Patient presents today as a postoperative follow-up with a new complaint of right digit 2, 3 and 4 purple discoloration.  Patient noticed it today.  She denies any fevers or chills or drainage on the postoperative dressing.  Patient denies any trauma to the right foot.  She had a right 1st MPJ fusion by Dr. Mora on 08/17/23.  She states that her pain has been controlled; however, this morning, she felt like she was getting bee stings at the incision site.  Patient ambulates in the surgical shoe. 
no chest pain

## 2023-08-29 PROCEDURE — 93298 REM INTERROG DEV EVAL SCRMS: CPT

## 2023-08-29 PROCEDURE — G2066: CPT

## 2023-09-06 ENCOUNTER — APPOINTMENT (OUTPATIENT)
Dept: OTOLARYNGOLOGY | Facility: CLINIC | Age: 78
End: 2023-09-06
Payer: MEDICARE

## 2023-09-06 VITALS
BODY MASS INDEX: 25.61 KG/M2 | HEART RATE: 62 BPM | OXYGEN SATURATION: 97 % | WEIGHT: 169 LBS | DIASTOLIC BLOOD PRESSURE: 61 MMHG | HEIGHT: 68 IN | SYSTOLIC BLOOD PRESSURE: 105 MMHG

## 2023-09-06 PROCEDURE — 99024 POSTOP FOLLOW-UP VISIT: CPT

## 2023-09-06 NOTE — HISTORY OF PRESENT ILLNESS
[de-identified] : Mr. De León presents for post op follow up on left tongue lesion. S/p Excision of white lesion of left lateral oral tongue on  8/18/2023 for white lesion of left lateral oral tongue. Final path-Focal area of mild dysplasia with koilocytotic change hx of  left lateral tongue biopsy on 11/3/2020 showing moderate to focally severe epithelial dysplasia Reports feeling well. Some tenderness at the site, but eating ok.

## 2023-09-06 NOTE — PHYSICAL EXAM
[de-identified] : L OT healing well [Midline] : trachea located in midline position [Normal] : no rashes

## 2023-09-06 NOTE — CONSULT LETTER
[Dear  ___] : Dear  [unfilled], [Courtesy Letter:] : I had the pleasure of seeing your patient, [unfilled], in my office today. [Please see my note below.] : Please see my note below. [Sincerely,] : Sincerely, [FreeTextEntry2] : Crista Carroll MD, DDS ([place], [place]) \par   \par   [FreeTextEntry3] : Boris Brooks MD, FACS\par  \par     [facility]\par  Associate Chair\par     Department of Otolaryngology\par  \par  Professor\par  Otolaryngology & Molecular Medicine\par  [facility] [employer]\par

## 2023-10-02 ENCOUNTER — NON-APPOINTMENT (OUTPATIENT)
Age: 78
End: 2023-10-02

## 2023-10-02 ENCOUNTER — APPOINTMENT (OUTPATIENT)
Dept: ELECTROPHYSIOLOGY | Facility: CLINIC | Age: 78
End: 2023-10-02
Payer: MEDICARE

## 2023-10-03 PROCEDURE — 93298 REM INTERROG DEV EVAL SCRMS: CPT

## 2023-10-03 PROCEDURE — G2066: CPT

## 2023-11-02 ENCOUNTER — APPOINTMENT (OUTPATIENT)
Dept: ELECTROPHYSIOLOGY | Facility: CLINIC | Age: 78
End: 2023-11-02
Payer: MEDICARE

## 2023-11-02 ENCOUNTER — NON-APPOINTMENT (OUTPATIENT)
Age: 78
End: 2023-11-02

## 2023-11-02 ENCOUNTER — APPOINTMENT (OUTPATIENT)
Dept: VASCULAR SURGERY | Facility: CLINIC | Age: 78
End: 2023-11-02
Payer: MEDICARE

## 2023-11-02 VITALS — DIASTOLIC BLOOD PRESSURE: 67 MMHG | SYSTOLIC BLOOD PRESSURE: 119 MMHG | HEART RATE: 50 BPM

## 2023-11-02 VITALS
DIASTOLIC BLOOD PRESSURE: 67 MMHG | SYSTOLIC BLOOD PRESSURE: 122 MMHG | HEIGHT: 68 IN | BODY MASS INDEX: 25.76 KG/M2 | TEMPERATURE: 97.4 F | HEART RATE: 48 BPM | WEIGHT: 170 LBS

## 2023-11-02 PROCEDURE — 93978 VASCULAR STUDY: CPT

## 2023-11-02 PROCEDURE — 93880 EXTRACRANIAL BILAT STUDY: CPT

## 2023-11-02 PROCEDURE — 99212 OFFICE O/P EST SF 10 MIN: CPT

## 2023-11-03 PROCEDURE — 93298 REM INTERROG DEV EVAL SCRMS: CPT | Mod: NC

## 2023-11-03 PROCEDURE — G2066: CPT | Mod: NC

## 2023-12-06 ENCOUNTER — NON-APPOINTMENT (OUTPATIENT)
Age: 78
End: 2023-12-06

## 2023-12-06 ENCOUNTER — APPOINTMENT (OUTPATIENT)
Dept: ELECTROPHYSIOLOGY | Facility: CLINIC | Age: 78
End: 2023-12-06
Payer: MEDICARE

## 2023-12-07 PROCEDURE — G2066: CPT

## 2023-12-07 PROCEDURE — 93298 REM INTERROG DEV EVAL SCRMS: CPT

## 2024-01-01 NOTE — ASU PATIENT PROFILE, ADULT - NSICDXPASTSURGICALHX_GEN_ALL_CORE_FT
initiation of breastfeeding/breast milk feeding PAST SURGICAL HISTORY:  S/P ablation of ventricular arrhythmia     S/P left knee arthroscopy     Status post endovascular aneurysm repair (EVAR)     Status post placement of implantable loop recorder

## 2024-01-10 ENCOUNTER — APPOINTMENT (OUTPATIENT)
Dept: ELECTROPHYSIOLOGY | Facility: CLINIC | Age: 79
End: 2024-01-10
Payer: MEDICARE

## 2024-01-10 ENCOUNTER — NON-APPOINTMENT (OUTPATIENT)
Age: 79
End: 2024-01-10

## 2024-01-11 PROCEDURE — 93298 REM INTERROG DEV EVAL SCRMS: CPT

## 2024-01-17 ENCOUNTER — APPOINTMENT (OUTPATIENT)
Dept: OTOLARYNGOLOGY | Facility: CLINIC | Age: 79
End: 2024-01-17
Payer: MEDICARE

## 2024-01-17 DIAGNOSIS — K14.9 DISEASE OF TONGUE, UNSPECIFIED: ICD-10-CM

## 2024-01-17 PROCEDURE — 99214 OFFICE O/P EST MOD 30 MIN: CPT

## 2024-01-17 NOTE — CONSULT LETTER
[Dear  ___] : Dear  [unfilled], [Courtesy Letter:] : I had the pleasure of seeing your patient, [unfilled], in my office today. [Please see my note below.] : Please see my note below. [Sincerely,] : Sincerely, [FreeTextEntry2] : Crista Carroll MD, DDS ([place], [place]) \par   \par   [FreeTextEntry3] : Boris Brooks MD, FACS\par  \par     [facility] [place]\par  Associate Chair\par     Department of Otolaryngology\par  \par  Professor\par  Otolaryngology & Molecular Medicine\par  [facility] [employer]\par

## 2024-01-17 NOTE — HISTORY OF PRESENT ILLNESS
[None] : No associated symptoms are reported. [de-identified] : Mr. De León presents for a follow up on left tongue lesion. S/p Excision of white lesion of left lateral oral tongue on  8/18/2023 for white lesion of left lateral oral tongue. Final path-Focal area of mild dysplasia with koilocytotic change hx of  left lateral tongue biopsy on 11/3/2020 showing moderate to focally severe epithelial dysplasia Denies any new oral lesions.

## 2024-02-14 ENCOUNTER — APPOINTMENT (OUTPATIENT)
Dept: ELECTROPHYSIOLOGY | Facility: CLINIC | Age: 79
End: 2024-02-14
Payer: MEDICARE

## 2024-02-14 ENCOUNTER — NON-APPOINTMENT (OUTPATIENT)
Age: 79
End: 2024-02-14

## 2024-02-15 PROCEDURE — 93298 REM INTERROG DEV EVAL SCRMS: CPT

## 2024-02-21 NOTE — ED ADULT NURSE NOTE - NSSISCREENINGQ5_ED_A_ED
Asim/'s office called to find out diagnosis codes in order to schedule pt.  Please call.    
Rn called  back and provided the information needed.  
No

## 2024-03-20 ENCOUNTER — APPOINTMENT (OUTPATIENT)
Dept: ELECTROPHYSIOLOGY | Facility: CLINIC | Age: 79
End: 2024-03-20
Payer: MEDICARE

## 2024-03-20 ENCOUNTER — NON-APPOINTMENT (OUTPATIENT)
Age: 79
End: 2024-03-20

## 2024-03-20 PROCEDURE — 93298 REM INTERROG DEV EVAL SCRMS: CPT

## 2024-04-24 ENCOUNTER — NON-APPOINTMENT (OUTPATIENT)
Age: 79
End: 2024-04-24

## 2024-04-24 ENCOUNTER — APPOINTMENT (OUTPATIENT)
Dept: ELECTROPHYSIOLOGY | Facility: CLINIC | Age: 79
End: 2024-04-24
Payer: MEDICARE

## 2024-04-24 PROCEDURE — 93298 REM INTERROG DEV EVAL SCRMS: CPT

## 2024-05-03 ENCOUNTER — RX RENEWAL (OUTPATIENT)
Age: 79
End: 2024-05-03

## 2024-05-03 RX ORDER — METOPROLOL SUCCINATE 25 MG/1
25 TABLET, EXTENDED RELEASE ORAL
Qty: 90 | Refills: 3 | Status: ACTIVE | COMMUNITY
Start: 2021-03-01 | End: 1900-01-01

## 2024-05-09 ENCOUNTER — APPOINTMENT (OUTPATIENT)
Dept: VASCULAR SURGERY | Facility: CLINIC | Age: 79
End: 2024-05-09
Payer: MEDICARE

## 2024-05-09 VITALS
HEIGHT: 68 IN | WEIGHT: 175 LBS | HEART RATE: 51 BPM | SYSTOLIC BLOOD PRESSURE: 112 MMHG | DIASTOLIC BLOOD PRESSURE: 69 MMHG | BODY MASS INDEX: 26.52 KG/M2

## 2024-05-09 VITALS — TEMPERATURE: 97.5 F | SYSTOLIC BLOOD PRESSURE: 121 MMHG | DIASTOLIC BLOOD PRESSURE: 74 MMHG | HEART RATE: 54 BPM

## 2024-05-09 DIAGNOSIS — I71.9 AORTIC ANEURYSM OF UNSPECIFIED SITE, W/OUT RUPTURE: ICD-10-CM

## 2024-05-09 DIAGNOSIS — I65.23 OCCLUSION AND STENOSIS OF BILATERAL CAROTID ARTERIES: ICD-10-CM

## 2024-05-09 PROCEDURE — 99213 OFFICE O/P EST LOW 20 MIN: CPT

## 2024-05-09 PROCEDURE — 93978 VASCULAR STUDY: CPT

## 2024-05-09 PROCEDURE — 93880 EXTRACRANIAL BILAT STUDY: CPT

## 2024-05-13 PROBLEM — I65.23 ASYMPTOMATIC BILATERAL CAROTID ARTERY STENOSIS: Status: ACTIVE | Noted: 2022-11-10

## 2024-05-13 PROBLEM — I71.9 PENETRATING ULCER OF AORTA: Status: ACTIVE | Noted: 2023-04-27
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
## 2024-05-13 NOTE — PHYSICAL EXAM
[2+] : left 2+ [Alert] : alert [Oriented to Person] : oriented to person [Oriented to Place] : oriented to place [Oriented to Time] : oriented to time [Calm] : calm [de-identified] : NAD, well appearing  [de-identified] : unlabored [de-identified] : soft, NT  [de-identified] : grossly normal

## 2024-05-13 NOTE — ASSESSMENT
[FreeTextEntry1] : A/P  MARIO s/p EVAR in 2021. Stable duplex findings. Follow up in 6 months. Consider annual surveillance if no changes at next visit.   Carotid artery stenosis - currently without new deficits.  H/O stroke in 2018 with aphasia and right sided weakness.  Continue with medical management and routine surveillance studies. Continue with antiplatelet and statin. Follow up in 6 months for repeat carotid duplex.

## 2024-05-17 ENCOUNTER — APPOINTMENT (OUTPATIENT)
Dept: CARDIOLOGY | Facility: CLINIC | Age: 79
End: 2024-05-17
Payer: MEDICARE

## 2024-05-17 VITALS
HEART RATE: 65 BPM | HEIGHT: 68 IN | DIASTOLIC BLOOD PRESSURE: 63 MMHG | OXYGEN SATURATION: 99 % | WEIGHT: 175 LBS | BODY MASS INDEX: 26.52 KG/M2 | SYSTOLIC BLOOD PRESSURE: 118 MMHG

## 2024-05-17 DIAGNOSIS — I10 ESSENTIAL (PRIMARY) HYPERTENSION: ICD-10-CM

## 2024-05-17 DIAGNOSIS — E55.9 VITAMIN D DEFICIENCY, UNSPECIFIED: ICD-10-CM

## 2024-05-17 DIAGNOSIS — I71.40 ABDOMINAL AORTIC ANEURYSM, WITHOUT RUPTURE, UNSPECIFIED: ICD-10-CM

## 2024-05-17 LAB
25(OH)D3 SERPL-MCNC: 17.8 NG/ML
ALBUMIN SERPL ELPH-MCNC: 4.2 G/DL
ALP BLD-CCNC: 104 U/L
ALT SERPL-CCNC: 20 U/L
ANION GAP SERPL CALC-SCNC: 10 MMOL/L
AST SERPL-CCNC: 17 U/L
BILIRUB SERPL-MCNC: 0.7 MG/DL
BUN SERPL-MCNC: 23 MG/DL
CALCIUM SERPL-MCNC: 10.3 MG/DL
CHLORIDE SERPL-SCNC: 105 MMOL/L
CHOLEST SERPL-MCNC: 120 MG/DL
CO2 SERPL-SCNC: 23 MMOL/L
CREAT SERPL-MCNC: 0.77 MG/DL
EGFR: 91 ML/MIN/1.73M2
ESTIMATED AVERAGE GLUCOSE: 97 MG/DL
GLUCOSE SERPL-MCNC: 94 MG/DL
HBA1C MFR BLD HPLC: 5 %
HCT VFR BLD CALC: 40.3 %
HDLC SERPL-MCNC: 55 MG/DL
HGB BLD-MCNC: 14.2 G/DL
LDLC SERPL CALC-MCNC: 51 MG/DL
MCHC RBC-ENTMCNC: 30.3 PG
MCHC RBC-ENTMCNC: 35.2 GM/DL
MCV RBC AUTO: 85.9 FL
NONHDLC SERPL-MCNC: 64 MG/DL
PLATELET # BLD AUTO: 326 K/UL
POTASSIUM SERPL-SCNC: 3.6 MMOL/L
PROT SERPL-MCNC: 6.5 G/DL
PSA SERPL-MCNC: 0.44 NG/ML
RBC # BLD: 4.69 M/UL
RBC # FLD: 13.9 %
SODIUM SERPL-SCNC: 139 MMOL/L
TRIGL SERPL-MCNC: 64 MG/DL
TSH SERPL-ACNC: 1.07 UIU/ML
WBC # FLD AUTO: 7.1 K/UL

## 2024-05-17 PROCEDURE — G2211 COMPLEX E/M VISIT ADD ON: CPT

## 2024-05-17 PROCEDURE — 99214 OFFICE O/P EST MOD 30 MIN: CPT

## 2024-05-17 RX ORDER — ERGOCALCIFEROL 1.25 MG/1
1.25 MG CAPSULE, LIQUID FILLED ORAL
Qty: 12 | Refills: 0 | Status: ACTIVE | COMMUNITY
Start: 2024-05-17 | End: 1900-01-01

## 2024-05-17 NOTE — REASON FOR VISIT
[Follow-Up - Clinic] : a clinic follow-up of [FreeTextEntry2] : AAA [FreeTextEntry1] : 5/17 Since last visit patient reports doing well BP controlled no cp or sob Had visit with Dr. Paulino.  EVAR is good Left ICA moderate stenosis 50-69% stenosis.   Medications: Norvasc 10 mg daily Lipitor 80 mg daily Plavix HCTZ 25 mg daily Famotidine Escitalopram Toprol XL 25mg daily   8/2023 78 y/o M w/ Ex-Smoker, PMHX HTN, HLD, CVA in 2018 with residual deficit, PSVT s/p ablation, EVAR for a 3.2 cm penetrating aortic ulcer on 9/20/2021 with Dr. Paulino, Dysplasia of tongue pre-op tongue surgery on 8/18.  ILR interrogation in 7/2023 showed no events.  Aortoiliac US April demonstrates a patent EVAR without evidence of endoleak. Residual sac measures 2.6 cm.  Carotid artery duplex 4/2023 demonstrates mild disease of the R ICA and moderate disease of the L ICA (187/41). Vertebral arteries are antegrade bilaterally.  No C/P or SOB. No LE pain or edema. No abdominal pain.  Medications: Norvasc 10 mg daily Lipitor 80 mg daily Plavix HCTZ 25 mg daily Famotidine Escitalopram Toprol XL 25mg daily   7/29/2022 Doing well No chest pain  no sob. feels well no changes to his meds BP is well controlled He walks a bit, lifts light weights at time   Medications: Amlodipine 10 HCTZ 25 Lexapro Lipitor 80 Metoprolol XL 25 daily   Plavix 75 Famotidine    1/28/2022  S/p EVAR in 9/2021 with Dr. Paulino.  S/p loop recorder placement approximately 1.5 months ago.  Denies C/P or SOB. Denies abdominal pain. History of chronic back pain, unchanged. No groin pain. BP is a bit elevated.  In good spirits.    Medications: Amlodipine 10 HCTZ 25 Lexapro Lipitor 80 Metoprolol XL 25 daily   Plavix 75 Famotidine   9/15/2021 Plans for endovascular intervention of the aortic aneurysm with Dr. Paulino next week Echo was normal ECG was non-ischemic He can climb flights of stairs without any chest pain no coronary stents.  not smoking no palpitations. Seen by Dr. Day a few days ago, he has a loop recorder.  Might need it replaced.  No changes to his medications  His PCP is Dr. Carmona.      Followup visit US aorta shows stable saccular aneurysm  US arterial duplex without pop aneurysm no sig stenosis no claudication no cp or sob LDL was 83 on recent labs He is on lipitor 40  No pulsatile abdominal pains or masses  Meds: Amlodipine 10 HCTZ 25 Lexapro Lipitor 40 Metoprolol 25 daily   Plavix 75

## 2024-05-17 NOTE — ASSESSMENT
[FreeTextEntry1] : Assessment 1. Small, saccular Abdominal aortic aneurysm - s/p EVAR 2. HTN - controlled  3. PSVT s/p ablation 4. Mild to Moderate Carotid Artery Plaque  Plan: 1.  Continue excellent BP control.  Goal BP < 120 2.  Lipitor to 80mg daily for goal LDL < 70 3.   Cont Plavix 4.  Last Echo with normal LV function.  5.  Continue current BP regimen.  6. Loop recorder monitoring with Dr. Day. Last interrogation normal.  7. Continue surveillance of AAA s/p EVAR and carotid plaque with Dr. Paulino. 7. Follow-up in 6 months. 8.  Labs today

## 2024-05-17 NOTE — PHYSICAL EXAM
[General Appearance - Well Developed] : well developed [Normal Appearance] : normal appearance [Well Groomed] : well groomed [General Appearance - Well Nourished] : well nourished [No Deformities] : no deformities [General Appearance - In No Acute Distress] : no acute distress [Normal Conjunctiva] : the conjunctiva exhibited no abnormalities [Eyelids - No Xanthelasma] : the eyelids demonstrated no xanthelasmas [Normal Oral Mucosa] : normal oral mucosa [No Oral Pallor] : no oral pallor [No Oral Cyanosis] : no oral cyanosis [Heart Rate And Rhythm] : heart rate and rhythm were normal [Heart Sounds] : normal S1 and S2 [Murmurs] : no murmurs present [Respiration, Rhythm And Depth] : normal respiratory rhythm and effort [Exaggerated Use Of Accessory Muscles For Inspiration] : no accessory muscle use [Auscultation Breath Sounds / Voice Sounds] : lungs were clear to auscultation bilaterally [Abdomen Soft] : soft [Abdomen Tenderness] : non-tender [Abdomen Mass (___ Cm)] : no abdominal mass palpated [Abnormal Walk] : normal gait [Gait - Sufficient For Exercise Testing] : the gait was sufficient for exercise testing [Nail Clubbing] : no clubbing of the fingernails [Cyanosis, Localized] : no localized cyanosis [Petechial Hemorrhages (___cm)] : no petechial hemorrhages [] : no ischemic changes [No Skin Ulcers] : no skin ulcer [Oriented To Time, Place, And Person] : oriented to person, place, and time [Affect] : the affect was normal [Mood] : the mood was normal [No Anxiety] : not feeling anxious [FreeTextEntry1] : No LE edema

## 2024-05-28 ENCOUNTER — NON-APPOINTMENT (OUTPATIENT)
Age: 79
End: 2024-05-28

## 2024-05-28 ENCOUNTER — APPOINTMENT (OUTPATIENT)
Dept: ELECTROPHYSIOLOGY | Facility: CLINIC | Age: 79
End: 2024-05-28
Payer: MEDICARE

## 2024-05-28 PROCEDURE — 93298 REM INTERROG DEV EVAL SCRMS: CPT

## 2024-07-01 ENCOUNTER — APPOINTMENT (OUTPATIENT)
Dept: ELECTROPHYSIOLOGY | Facility: CLINIC | Age: 79
End: 2024-07-01
Payer: MEDICARE

## 2024-07-01 ENCOUNTER — NON-APPOINTMENT (OUTPATIENT)
Age: 79
End: 2024-07-01

## 2024-07-01 VITALS — DIASTOLIC BLOOD PRESSURE: 69 MMHG | SYSTOLIC BLOOD PRESSURE: 118 MMHG | HEART RATE: 56 BPM

## 2024-07-01 DIAGNOSIS — I47.10 SUPRAVENTRICULAR TACHYCARDIA, UNSPECIFIED: ICD-10-CM

## 2024-07-01 DIAGNOSIS — Z95.818 PRESENCE OF OTHER CARDIAC IMPLANTS AND GRAFTS: ICD-10-CM

## 2024-07-01 PROCEDURE — 93285 PRGRMG DEV EVAL SCRMS IP: CPT

## 2024-07-01 PROCEDURE — 93000 ELECTROCARDIOGRAM COMPLETE: CPT | Mod: 59

## 2024-07-01 PROCEDURE — 99213 OFFICE O/P EST LOW 20 MIN: CPT | Mod: 25

## 2024-07-10 ENCOUNTER — APPOINTMENT (OUTPATIENT)
Dept: OTOLARYNGOLOGY | Facility: CLINIC | Age: 79
End: 2024-07-10
Payer: MEDICARE

## 2024-07-10 VITALS
BODY MASS INDEX: 26.52 KG/M2 | WEIGHT: 175 LBS | HEIGHT: 68 IN | SYSTOLIC BLOOD PRESSURE: 117 MMHG | HEART RATE: 60 BPM | DIASTOLIC BLOOD PRESSURE: 70 MMHG

## 2024-07-10 DIAGNOSIS — K14.9 DISEASE OF TONGUE, UNSPECIFIED: ICD-10-CM

## 2024-07-10 PROCEDURE — 99214 OFFICE O/P EST MOD 30 MIN: CPT

## 2024-08-05 ENCOUNTER — NON-APPOINTMENT (OUTPATIENT)
Age: 79
End: 2024-08-05

## 2024-08-05 ENCOUNTER — RX RENEWAL (OUTPATIENT)
Age: 79
End: 2024-08-05

## 2024-08-05 ENCOUNTER — APPOINTMENT (OUTPATIENT)
Dept: ELECTROPHYSIOLOGY | Facility: CLINIC | Age: 79
End: 2024-08-05

## 2024-08-05 PROCEDURE — 93298 REM INTERROG DEV EVAL SCRMS: CPT

## 2024-09-09 ENCOUNTER — NON-APPOINTMENT (OUTPATIENT)
Age: 79
End: 2024-09-09

## 2024-09-09 ENCOUNTER — APPOINTMENT (OUTPATIENT)
Dept: ELECTROPHYSIOLOGY | Facility: CLINIC | Age: 79
End: 2024-09-09
Payer: MEDICARE

## 2024-09-09 PROCEDURE — 93298 REM INTERROG DEV EVAL SCRMS: CPT

## 2024-09-11 ENCOUNTER — APPOINTMENT (OUTPATIENT)
Dept: INTERNAL MEDICINE | Facility: CLINIC | Age: 79
End: 2024-09-11

## 2024-09-11 ENCOUNTER — OUTPATIENT (OUTPATIENT)
Dept: OUTPATIENT SERVICES | Facility: HOSPITAL | Age: 79
LOS: 1 days | End: 2024-09-11
Payer: MEDICARE

## 2024-09-11 VITALS
SYSTOLIC BLOOD PRESSURE: 110 MMHG | WEIGHT: 180 LBS | HEART RATE: 56 BPM | DIASTOLIC BLOOD PRESSURE: 70 MMHG | BODY MASS INDEX: 29.63 KG/M2 | HEIGHT: 65.5 IN | OXYGEN SATURATION: 98 %

## 2024-09-11 DIAGNOSIS — I71.40 ABDOMINAL AORTIC ANEURYSM, WITHOUT RUPTURE, UNSPECIFIED: ICD-10-CM

## 2024-09-11 DIAGNOSIS — E78.5 HYPERLIPIDEMIA, UNSPECIFIED: ICD-10-CM

## 2024-09-11 DIAGNOSIS — I71.9 AORTIC ANEURYSM OF UNSPECIFIED SITE, W/OUT RUPTURE: ICD-10-CM

## 2024-09-11 DIAGNOSIS — K14.9 DISEASE OF TONGUE, UNSPECIFIED: ICD-10-CM

## 2024-09-11 DIAGNOSIS — I71.9 AORTIC ANEURYSM OF UNSPECIFIED SITE, WITHOUT RUPTURE: ICD-10-CM

## 2024-09-11 DIAGNOSIS — Z87.891 PERSONAL HISTORY OF NICOTINE DEPENDENCE: ICD-10-CM

## 2024-09-11 DIAGNOSIS — Z98.890 OTHER SPECIFIED POSTPROCEDURAL STATES: Chronic | ICD-10-CM

## 2024-09-11 DIAGNOSIS — Z00.00 ENCOUNTER FOR GENERAL ADULT MEDICAL EXAMINATION W/OUT ABNORMAL FINDINGS: ICD-10-CM

## 2024-09-11 DIAGNOSIS — E55.9 VITAMIN D DEFICIENCY, UNSPECIFIED: ICD-10-CM

## 2024-09-11 DIAGNOSIS — I10 ESSENTIAL (PRIMARY) HYPERTENSION: ICD-10-CM

## 2024-09-11 DIAGNOSIS — Z23 ENCOUNTER FOR IMMUNIZATION: ICD-10-CM

## 2024-09-11 PROCEDURE — G0463: CPT

## 2024-09-11 PROCEDURE — 99204 OFFICE O/P NEW MOD 45 MIN: CPT

## 2024-09-11 RX ORDER — ZOSTER VACCINE RECOMBINANT, ADJUVANTED 50 MCG/0.5
50 KIT INTRAMUSCULAR
Qty: 1 | Refills: 1 | Status: ACTIVE | COMMUNITY
Start: 2024-09-11 | End: 1900-01-01

## 2024-09-12 DIAGNOSIS — I10 ESSENTIAL (PRIMARY) HYPERTENSION: ICD-10-CM

## 2024-09-12 PROBLEM — Z23 ENCOUNTER FOR IMMUNIZATION: Status: ACTIVE | Noted: 2024-09-12 | Resolved: 2024-09-26

## 2024-09-13 LAB
25(OH)D3 SERPL-MCNC: 40.4 NG/ML
ANION GAP SERPL CALC-SCNC: 14 MMOL/L
BUN SERPL-MCNC: 20 MG/DL
CALCIUM SERPL-MCNC: 10.8 MG/DL
CHLORIDE SERPL-SCNC: 104 MMOL/L
CO2 SERPL-SCNC: 23 MMOL/L
CREAT SERPL-MCNC: 0.68 MG/DL
EGFR: 94 ML/MIN/1.73M2
ESTIMATED AVERAGE GLUCOSE: 100 MG/DL
GLUCOSE SERPL-MCNC: 98 MG/DL
HBA1C MFR BLD HPLC: 5.1 %
POTASSIUM SERPL-SCNC: 3.6 MMOL/L
SODIUM SERPL-SCNC: 141 MMOL/L

## 2024-09-17 LAB — ALBUMIN SERPL ELPH-MCNC: 4 G/DL

## 2024-09-17 RX ORDER — ZOSTER VACCINE RECOMBINANT, ADJUVANTED 50 MCG/0.5
50 KIT INTRAMUSCULAR
Qty: 1 | Refills: 1 | Status: ACTIVE | COMMUNITY
Start: 2024-09-17 | End: 1900-01-01

## 2024-09-19 NOTE — DISCHARGE NOTE NURSING/CASE MANAGEMENT/SOCIAL WORK - WILL THE PATIENT ACCEPT THE PFIZER COVID-19 VACCINE IF ELIGIBLE AND IT IS AVAILABLE?
No
[FreeTextEntry1] : Reviewed recent notes, labs and imaging today. And updated patient's EMR. Discussed plan as below with patient. 
[FreeTextEntry1] : Reviewed recent notes, labs and imaging today. And updated patient's EMR. Discussed plan as below with patient.

## 2024-10-14 ENCOUNTER — NON-APPOINTMENT (OUTPATIENT)
Age: 79
End: 2024-10-14

## 2024-10-14 ENCOUNTER — APPOINTMENT (OUTPATIENT)
Dept: ELECTROPHYSIOLOGY | Facility: CLINIC | Age: 79
End: 2024-10-14
Payer: MEDICARE

## 2024-10-14 PROCEDURE — 93298 REM INTERROG DEV EVAL SCRMS: CPT

## 2024-11-18 ENCOUNTER — NON-APPOINTMENT (OUTPATIENT)
Age: 79
End: 2024-11-18

## 2024-11-18 ENCOUNTER — APPOINTMENT (OUTPATIENT)
Dept: ELECTROPHYSIOLOGY | Facility: CLINIC | Age: 79
End: 2024-11-18
Payer: MEDICARE

## 2024-11-18 PROCEDURE — 93298 REM INTERROG DEV EVAL SCRMS: CPT

## 2024-11-21 ENCOUNTER — APPOINTMENT (OUTPATIENT)
Dept: VASCULAR SURGERY | Facility: CLINIC | Age: 79
End: 2024-11-21
Payer: MEDICARE

## 2024-11-21 VITALS
WEIGHT: 175 LBS | HEART RATE: 49 BPM | BODY MASS INDEX: 28.81 KG/M2 | DIASTOLIC BLOOD PRESSURE: 75 MMHG | TEMPERATURE: 97.5 F | SYSTOLIC BLOOD PRESSURE: 143 MMHG | HEIGHT: 65.5 IN

## 2024-11-21 VITALS — SYSTOLIC BLOOD PRESSURE: 121 MMHG | DIASTOLIC BLOOD PRESSURE: 68 MMHG | HEART RATE: 48 BPM

## 2024-11-21 DIAGNOSIS — I71.9 AORTIC ANEURYSM OF UNSPECIFIED SITE, W/OUT RUPTURE: ICD-10-CM

## 2024-11-21 DIAGNOSIS — I65.23 OCCLUSION AND STENOSIS OF BILATERAL CAROTID ARTERIES: ICD-10-CM

## 2024-11-21 PROCEDURE — 99213 OFFICE O/P EST LOW 20 MIN: CPT

## 2024-11-21 PROCEDURE — 93978 VASCULAR STUDY: CPT

## 2024-11-21 PROCEDURE — 93880 EXTRACRANIAL BILAT STUDY: CPT

## 2024-11-22 ENCOUNTER — NON-APPOINTMENT (OUTPATIENT)
Age: 79
End: 2024-11-22

## 2024-11-22 ENCOUNTER — APPOINTMENT (OUTPATIENT)
Dept: CARDIOLOGY | Facility: CLINIC | Age: 79
End: 2024-11-22
Payer: MEDICARE

## 2024-11-22 VITALS — SYSTOLIC BLOOD PRESSURE: 119 MMHG | HEART RATE: 64 BPM | OXYGEN SATURATION: 98 % | DIASTOLIC BLOOD PRESSURE: 68 MMHG

## 2024-11-22 DIAGNOSIS — R06.09 OTHER FORMS OF DYSPNEA: ICD-10-CM

## 2024-11-22 PROCEDURE — G2211 COMPLEX E/M VISIT ADD ON: CPT

## 2024-11-22 PROCEDURE — 99214 OFFICE O/P EST MOD 30 MIN: CPT

## 2025-01-06 ENCOUNTER — NON-APPOINTMENT (OUTPATIENT)
Age: 80
End: 2025-01-06

## 2025-01-06 ENCOUNTER — APPOINTMENT (OUTPATIENT)
Dept: ELECTROPHYSIOLOGY | Facility: CLINIC | Age: 80
End: 2025-01-06
Payer: MEDICARE

## 2025-01-06 VITALS
SYSTOLIC BLOOD PRESSURE: 119 MMHG | BODY MASS INDEX: 28.68 KG/M2 | OXYGEN SATURATION: 98 % | HEART RATE: 61 BPM | WEIGHT: 175 LBS | DIASTOLIC BLOOD PRESSURE: 69 MMHG

## 2025-01-06 DIAGNOSIS — I10 ESSENTIAL (PRIMARY) HYPERTENSION: ICD-10-CM

## 2025-01-06 DIAGNOSIS — Z95.818 PRESENCE OF OTHER CARDIAC IMPLANTS AND GRAFTS: ICD-10-CM

## 2025-01-06 DIAGNOSIS — I47.10 SUPRAVENTRICULAR TACHYCARDIA, UNSPECIFIED: ICD-10-CM

## 2025-01-06 PROCEDURE — 93000 ELECTROCARDIOGRAM COMPLETE: CPT | Mod: 59

## 2025-01-06 PROCEDURE — 99214 OFFICE O/P EST MOD 30 MIN: CPT

## 2025-01-06 PROCEDURE — 93285 PRGRMG DEV EVAL SCRMS IP: CPT

## 2025-01-07 ENCOUNTER — APPOINTMENT (OUTPATIENT)
Dept: CT IMAGING | Facility: CLINIC | Age: 80
End: 2025-01-07
Payer: MEDICARE

## 2025-01-07 ENCOUNTER — OUTPATIENT (OUTPATIENT)
Dept: OUTPATIENT SERVICES | Facility: HOSPITAL | Age: 80
LOS: 1 days | End: 2025-01-07
Payer: MEDICARE

## 2025-01-07 DIAGNOSIS — Z98.890 OTHER SPECIFIED POSTPROCEDURAL STATES: Chronic | ICD-10-CM

## 2025-01-07 DIAGNOSIS — Z95.818 PRESENCE OF OTHER CARDIAC IMPLANTS AND GRAFTS: Chronic | ICD-10-CM

## 2025-01-07 DIAGNOSIS — R06.09 OTHER FORMS OF DYSPNEA: ICD-10-CM

## 2025-01-07 PROCEDURE — 75574 CT ANGIO HRT W/3D IMAGE: CPT | Mod: 26

## 2025-01-07 PROCEDURE — 75574 CT ANGIO HRT W/3D IMAGE: CPT

## 2025-01-10 ENCOUNTER — OUTPATIENT (OUTPATIENT)
Dept: OUTPATIENT SERVICES | Facility: HOSPITAL | Age: 80
LOS: 1 days | End: 2025-01-10
Payer: MEDICARE

## 2025-01-10 ENCOUNTER — APPOINTMENT (OUTPATIENT)
Dept: CV DIAGNOSITCS | Facility: HOSPITAL | Age: 80
End: 2025-01-10

## 2025-01-10 ENCOUNTER — RESULT REVIEW (OUTPATIENT)
Age: 80
End: 2025-01-10

## 2025-01-10 DIAGNOSIS — Z98.890 OTHER SPECIFIED POSTPROCEDURAL STATES: Chronic | ICD-10-CM

## 2025-01-10 DIAGNOSIS — Z95.818 PRESENCE OF OTHER CARDIAC IMPLANTS AND GRAFTS: Chronic | ICD-10-CM

## 2025-01-10 DIAGNOSIS — R06.09 OTHER FORMS OF DYSPNEA: ICD-10-CM

## 2025-01-10 PROCEDURE — 76376 3D RENDER W/INTRP POSTPROCES: CPT

## 2025-01-10 PROCEDURE — 93356 MYOCRD STRAIN IMG SPCKL TRCK: CPT

## 2025-01-10 PROCEDURE — 93306 TTE W/DOPPLER COMPLETE: CPT | Mod: 26

## 2025-01-10 PROCEDURE — 76376 3D RENDER W/INTRP POSTPROCES: CPT | Mod: 26

## 2025-01-10 PROCEDURE — 93306 TTE W/DOPPLER COMPLETE: CPT

## 2025-01-13 ENCOUNTER — NON-APPOINTMENT (OUTPATIENT)
Age: 80
End: 2025-01-13

## 2025-01-15 ENCOUNTER — APPOINTMENT (OUTPATIENT)
Dept: OTOLARYNGOLOGY | Facility: CLINIC | Age: 80
End: 2025-01-15
Payer: MEDICARE

## 2025-01-15 VITALS
OXYGEN SATURATION: 99 % | HEIGHT: 65.5 IN | SYSTOLIC BLOOD PRESSURE: 128 MMHG | DIASTOLIC BLOOD PRESSURE: 70 MMHG | BODY MASS INDEX: 28.81 KG/M2 | HEART RATE: 64 BPM | WEIGHT: 175 LBS

## 2025-01-15 PROCEDURE — 99214 OFFICE O/P EST MOD 30 MIN: CPT

## 2025-01-21 ENCOUNTER — TRANSCRIPTION ENCOUNTER (OUTPATIENT)
Age: 80
End: 2025-01-21

## 2025-01-21 ENCOUNTER — INPATIENT (INPATIENT)
Facility: HOSPITAL | Age: 80
LOS: 0 days | Discharge: HOME | DRG: 303 | End: 2025-01-22
Attending: INTERNAL MEDICINE | Admitting: INTERNAL MEDICINE
Payer: MEDICARE

## 2025-01-21 VITALS
HEIGHT: 67 IN | TEMPERATURE: 98 F | SYSTOLIC BLOOD PRESSURE: 150 MMHG | DIASTOLIC BLOOD PRESSURE: 69 MMHG | RESPIRATION RATE: 16 BRPM | WEIGHT: 175.05 LBS | OXYGEN SATURATION: 97 % | HEART RATE: 60 BPM

## 2025-01-21 DIAGNOSIS — R93.1 ABNORMAL FINDINGS ON DIAGNOSTIC IMAGING OF HEART AND CORONARY CIRCULATION: ICD-10-CM

## 2025-01-21 DIAGNOSIS — Z98.890 OTHER SPECIFIED POSTPROCEDURAL STATES: Chronic | ICD-10-CM

## 2025-01-21 DIAGNOSIS — Z95.818 PRESENCE OF OTHER CARDIAC IMPLANTS AND GRAFTS: Chronic | ICD-10-CM

## 2025-01-21 LAB
ANION GAP SERPL CALC-SCNC: 11 MMOL/L — SIGNIFICANT CHANGE UP (ref 5–17)
BUN SERPL-MCNC: 18 MG/DL — SIGNIFICANT CHANGE UP (ref 7–23)
CALCIUM SERPL-MCNC: 10.5 MG/DL — SIGNIFICANT CHANGE UP (ref 8.4–10.5)
CHLORIDE SERPL-SCNC: 106 MMOL/L — SIGNIFICANT CHANGE UP (ref 96–108)
CO2 SERPL-SCNC: 24 MMOL/L — SIGNIFICANT CHANGE UP (ref 22–31)
CREAT SERPL-MCNC: 0.74 MG/DL — SIGNIFICANT CHANGE UP (ref 0.5–1.3)
EGFR: 92 ML/MIN/1.73M2 — SIGNIFICANT CHANGE UP
GLUCOSE SERPL-MCNC: 97 MG/DL — SIGNIFICANT CHANGE UP (ref 70–99)
HCT VFR BLD CALC: 45 % — SIGNIFICANT CHANGE UP (ref 39–50)
HGB BLD-MCNC: 15 G/DL — SIGNIFICANT CHANGE UP (ref 13–17)
MCHC RBC-ENTMCNC: 29.9 PG — SIGNIFICANT CHANGE UP (ref 27–34)
MCHC RBC-ENTMCNC: 33.3 G/DL — SIGNIFICANT CHANGE UP (ref 32–36)
MCV RBC AUTO: 89.6 FL — SIGNIFICANT CHANGE UP (ref 80–100)
NRBC # BLD: 0 /100 WBCS — SIGNIFICANT CHANGE UP (ref 0–0)
NRBC BLD-RTO: 0 /100 WBCS — SIGNIFICANT CHANGE UP (ref 0–0)
PLATELET # BLD AUTO: 328 K/UL — SIGNIFICANT CHANGE UP (ref 150–400)
POTASSIUM SERPL-MCNC: 4.3 MMOL/L — SIGNIFICANT CHANGE UP (ref 3.5–5.3)
POTASSIUM SERPL-SCNC: 4.3 MMOL/L — SIGNIFICANT CHANGE UP (ref 3.5–5.3)
RBC # BLD: 5.02 M/UL — SIGNIFICANT CHANGE UP (ref 4.2–5.8)
RBC # FLD: 13.7 % — SIGNIFICANT CHANGE UP (ref 10.3–14.5)
SODIUM SERPL-SCNC: 141 MMOL/L — SIGNIFICANT CHANGE UP (ref 135–145)
WBC # BLD: 10.78 K/UL — HIGH (ref 3.8–10.5)
WBC # FLD AUTO: 10.78 K/UL — HIGH (ref 3.8–10.5)

## 2025-01-21 PROCEDURE — 93010 ELECTROCARDIOGRAM REPORT: CPT

## 2025-01-21 RX ORDER — FAMOTIDINE 10 MG/ML
1 INJECTION INTRAVENOUS
Refills: 0 | DISCHARGE

## 2025-01-21 RX ORDER — BACTERIOSTATIC SODIUM CHLORIDE 0.9 %
1000 VIAL (ML) INJECTION
Refills: 0 | Status: DISCONTINUED | OUTPATIENT
Start: 2025-01-21 | End: 2025-01-22

## 2025-01-21 RX ORDER — METOPROLOL SUCCINATE 25 MG
25 TABLET, EXTENDED RELEASE 24 HR ORAL DAILY
Refills: 0 | Status: DISCONTINUED | OUTPATIENT
Start: 2025-01-21 | End: 2025-01-22

## 2025-01-21 RX ORDER — ASPIRIN 81 MG/1
1 TABLET, COATED ORAL
Qty: 0 | Refills: 0 | DISCHARGE
Start: 2025-01-21

## 2025-01-21 RX ORDER — ASPIRIN 81 MG/1
81 TABLET, COATED ORAL DAILY
Refills: 0 | Status: DISCONTINUED | OUTPATIENT
Start: 2025-01-21 | End: 2025-01-22

## 2025-01-21 RX ORDER — BACTERIOSTATIC SODIUM CHLORIDE 0.9 %
250 VIAL (ML) INJECTION ONCE
Refills: 0 | Status: COMPLETED | OUTPATIENT
Start: 2025-01-21 | End: 2025-01-21

## 2025-01-21 RX ORDER — ESCITALOPRAM 10 MG/1
10 TABLET, FILM COATED ORAL DAILY
Refills: 0 | Status: DISCONTINUED | OUTPATIENT
Start: 2025-01-21 | End: 2025-01-22

## 2025-01-21 RX ORDER — ATORVASTATIN CALCIUM 80 MG/1
80 TABLET, FILM COATED ORAL AT BEDTIME
Refills: 0 | Status: DISCONTINUED | OUTPATIENT
Start: 2025-01-21 | End: 2025-01-22

## 2025-01-21 RX ORDER — AMLODIPINE BESYLATE 5 MG
10 TABLET ORAL DAILY
Refills: 0 | Status: DISCONTINUED | OUTPATIENT
Start: 2025-01-21 | End: 2025-01-22

## 2025-01-21 RX ADMIN — Medication 750 MILLILITER(S): at 12:04

## 2025-01-21 RX ADMIN — Medication 75 MILLILITER(S): at 12:04

## 2025-01-21 RX ADMIN — ATORVASTATIN CALCIUM 80 MILLIGRAM(S): 80 TABLET, FILM COATED ORAL at 21:35
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
## 2025-01-21 NOTE — DISCHARGE NOTE PROVIDER - CARE PROVIDER_API CALL
Moris Ca  Vascular Medicine  [address] [place] [address], [address] [place] [id number]  Phone: [phone]  Fax: [phone]  Follow Up Time: 2 weeks

## 2025-01-21 NOTE — ASU PATIENT PROFILE, ADULT - FALL HARM RISK - UNIVERSAL INTERVENTIONS
Bed in lowest position, wheels locked, appropriate side rails in place/Call bell, personal items and telephone in reach/Instruct patient to call for assistance before getting out of bed or chair/Non-slip footwear when patient is out of bed/[place] to call system/Physically safe environment - no spills, clutter or unnecessary equipment/Purposeful Proactive Rounding/Room/bathroom lighting operational, light cord in reach

## 2025-01-21 NOTE — DISCHARGE NOTE PROVIDER - NSDCCPCAREPLAN_GEN_ALL_CORE_FT
PRINCIPAL DISCHARGE DIAGNOSIS  Diagnosis: CAD (coronary artery disease)  Assessment and Plan of Treatment: Do not stop your aspirin or Plavix unless instructed to do so by your cardiologist, they help keep your stented arteries open. No heavy lifting or pushing/pulling with procedure arm for 2 weeks. No driving for 2 days. You may shower 24 hours following the procedure but avoid baths/swimming for 1 week. Check your wrist site for bleeding and/or swelling daily following procedure and call your doctor immediately if it occurs or if you experience increased pain at the site. Follow up with your cardiologist in 1-2 weeks. You may call [facility] if you have any questions/concerns regarding your procedure [phone].      SECONDARY DISCHARGE DIAGNOSES  Diagnosis: Encounter for cardiac rehabilitation  Assessment and Plan of Treatment: We have provided you with a prescription for cardiac rehab which is medically supervised exercise program for your heart and has been shown to improve the quantity and quality of life of people with heart disease like yours. You should attend cardiac rehab 3 times per week for 12 weeks. We have provided you with a list of nearby facilities. Please call your insurance carrier to determine which of these facilities are covered under your plan. Please bring this prescription with you to your follow up appointment with your cardiologist who can then further assist you to enroll into a cardiac rehab program.    Diagnosis: HTN (hypertension)  Assessment and Plan of Treatment: Continue with your blood pressure medications; eat a heart healthy diet with low salt diet; exercise regularly (consult with your physician or cardiologist first); maintain a heart healthy weight; if you smoke - quit (A resource to help you stop smoking is the [employer] for Tobacco Control – phone number 056-905-1218.); include healthy ways to manage stress. Continue to follow with your primary care physician or cardiologist.    Diagnosis: HLD (hyperlipidemia)  Assessment and Plan of Treatment: Continue with your cholesterol medications. Eat a heart healthy diet that is low in saturated fats and salt, and includes whole grains, fruits, vegetables and lean protein; exercise regularly (consult with your physician or cardiologist first); maintain a heart healthy weight; if you smoke - quit (A resource to help you stop smoking is the [facility] [employer] for Tobacco Control – phone number [phone].). Continue to follow with your primary physician or cardiologist.

## 2025-01-21 NOTE — DISCHARGE NOTE PROVIDER - NSDCCPTREATMENT_GEN_ALL_CORE_FT
PRINCIPAL PROCEDURE  Procedure: Placement of coronary artery stent  Findings and Treatment: One SANDRA to the prox RCA via RRA access.  DAPT with ASA, Plavix

## 2025-01-21 NOTE — H&P CARDIOLOGY - HISTORY OF PRESENT ILLNESS
79 yr old  M with PMHx of ILR, HTN, HLD, and acute ischemic L MCA CVA in 2018 (no residual deficits), Paroxysmal SVT, Small, saccular Abdominal aortic aneurysm - EVAR presents with JANE. Pt underwent CT heart 1/7/25 which was Abnormal. Pt was referred for Cardiac cath by Dr Moris Ca.   Coronary CTA showed: Co-coronary artery dominance. Multifocal plaque. Significant stenoses of the mid LAD (borderline mild to moderate), diagonal artery (moderate to severe), small to medium caliber ramus intermedius (moderate to severe), distal LCx (borderline mild to moderate), and proximal RCA (severe). Distal LAD plaque and additional branch vessel plaque, difficult to quantify due to small vessel caliber. 79 yr old  M with PMHx of ILR, HTN, HLD, and acute ischemic L MCA CVA in 2018 (no residual deficits), Paroxysmal SVT, Small, saccular Abdominal aortic aneurysm - EVAR presents with JANE. Pt underwent CT heart 1/7/25 which was Abnormal. Pt was referred for Cardiac cath by Dr Moris Ca.   Coronary CTA showed: Co-coronary artery dominance. Multifocal plaque. Significant stenoses of the mid LAD (borderline mild to moderate), diagonal artery (moderate to severe), small to medium caliber ramus intermedius (moderate to severe), distal LCx (borderline mild to moderate), and proximal RCA (severe). Distal LAD plaque and additional branch vessel plaque, difficult to quantify due to small vessel caliber.    TTE 1/10/25   1. Left ventricular cavity is normal in size. Left ventricular systolic function is normal with an ejection fraction of 64 % by 3D. There are no regional wall motion abnormalities seen.   2. Mildly enlarged right ventricular cavity size and normal right ventricular systolic function.   3. Estimated pulmonary artery systolic pressure is 32 mmHg.   4. No significant valvular disease.   5. Aortic root at the sinuses of Valsalva is dilated, measuring 4.10 cm (indexed 2.13 cm/m²).   6. No pericardial effusion seen.   7. Compared to the transthoracic echocardiogram performed on 9/9/2021, the aortic root has increased in size (3.97 cm on the prior study on my personal measurement, to 4.1 cm today)

## 2025-01-21 NOTE — DISCHARGE NOTE PROVIDER - NSDCMRMEDTOKEN_GEN_ALL_CORE_FT
amLODIPine 10 mg oral tablet: 1 tab(s) orally once a day  atorvastatin 80 mg oral tablet: 1 tab(s) orally once a day  clopidogrel 75 mg oral tablet: 1 tab(s) orally once a day LD 8/12/2023  escitalopram 10 mg oral tablet: 1 tab(s) orally once a day  hydroCHLOROthiazide 25 mg oral tablet: 1 tab(s) orally once a day  metoprolol succinate 25 mg oral tablet, extended release: 1 tab(s) orally once a day   amLODIPine 10 mg oral tablet: 1 tab(s) orally once a day  aspirin 81 mg oral delayed release tablet: 1 tab(s) orally once a day  atorvastatin 80 mg oral tablet: 1 tab(s) orally once a day  Cardiac Rehab: 3 times a week for 12 weeks. This is a referral, please follow with your outpatient Cardiologist for Script. MDD: 1 session  clopidogrel 75 mg oral tablet: 1 tab(s) orally once a day LD 8/12/2023  escitalopram 10 mg oral tablet: 1 tab(s) orally once a day  hydroCHLOROthiazide 25 mg oral tablet: 1 tab(s) orally once a day  metoprolol succinate 25 mg oral tablet, extended release: 1 tab(s) orally once a day

## 2025-01-21 NOTE — PATIENT PROFILE ADULT - FALL HARM RISK - UNIVERSAL INTERVENTIONS
Billing Type: Third-Party Bill Bed in lowest position, wheels locked, appropriate side rails in place/Call bell, personal items and telephone in reach/Instruct patient to call for assistance before getting out of bed or chair/Non-slip footwear when patient is out of bed/[place] to call system/Physically safe environment - no spills, clutter or unnecessary equipment/Purposeful Proactive Rounding/Room/bathroom lighting operational, light cord in reach
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
## 2025-01-21 NOTE — PATIENT PROFILE ADULT - DOES PATIENT HAVE ADVANCE DIRECTIVE
Patient: Montana Jo    Procedure(s):  Posterior Urethroplasty and cystoscopy - Wound Class: I-Clean    Diagnosis: Urethral Stricture  Diagnosis Additional Information: No value filed.    Anesthesia Type:   General     Note:  Airway :Nasal Cannula  Patient transferred to:PACU  Comments: Patient awake and breathing spont. VSS. Report to RN   97.5  85  128/95  16  97%      Vitals: (Last set prior to Anesthesia Care Transfer)    CRNA VITALS  4/21/2017 0957 - 4/21/2017 1034      4/21/2017             Pulse: 78    SpO2: 99 %    Resp Rate (observed): 18                Electronically Signed By: DAMARIS Correa CRNA  April 21, 2017  10:34 AM   No

## 2025-01-21 NOTE — DISCHARGE NOTE PROVIDER - NSDCFUSCHEDAPPT_GEN_ALL_CORE_FT
Brendan Gimenez  [facility]  INTMED  [address]  Scheduled Appointment: 01/22/2025    [facility]  ELECTROPH [address]  Scheduled Appointment: 02/10/2025    Moris Ca  [facility]  CARDIOLOGY [address]  Scheduled Appointment: 02/26/2025

## 2025-01-22 ENCOUNTER — TRANSCRIPTION ENCOUNTER (OUTPATIENT)
Age: 80
End: 2025-01-22

## 2025-01-22 ENCOUNTER — APPOINTMENT (OUTPATIENT)
Dept: INTERNAL MEDICINE | Facility: CLINIC | Age: 80
End: 2025-01-22
Payer: MEDICARE

## 2025-01-22 ENCOUNTER — OUTPATIENT (OUTPATIENT)
Dept: OUTPATIENT SERVICES | Facility: HOSPITAL | Age: 80
LOS: 1 days | End: 2025-01-22
Payer: MEDICARE

## 2025-01-22 VITALS
DIASTOLIC BLOOD PRESSURE: 63 MMHG | SYSTOLIC BLOOD PRESSURE: 131 MMHG | RESPIRATION RATE: 17 BRPM | HEART RATE: 52 BPM | OXYGEN SATURATION: 97 % | TEMPERATURE: 98 F

## 2025-01-22 VITALS
BODY MASS INDEX: 28.9 KG/M2 | HEIGHT: 66.5 IN | SYSTOLIC BLOOD PRESSURE: 136 MMHG | OXYGEN SATURATION: 97 % | DIASTOLIC BLOOD PRESSURE: 60 MMHG | WEIGHT: 182 LBS | HEART RATE: 75 BPM

## 2025-01-22 DIAGNOSIS — Z95.818 PRESENCE OF OTHER CARDIAC IMPLANTS AND GRAFTS: Chronic | ICD-10-CM

## 2025-01-22 DIAGNOSIS — Z98.890 OTHER SPECIFIED POSTPROCEDURAL STATES: Chronic | ICD-10-CM

## 2025-01-22 DIAGNOSIS — E78.5 HYPERLIPIDEMIA, UNSPECIFIED: ICD-10-CM

## 2025-01-22 DIAGNOSIS — I10 ESSENTIAL (PRIMARY) HYPERTENSION: ICD-10-CM

## 2025-01-22 DIAGNOSIS — M79.641 PAIN IN RIGHT HAND: ICD-10-CM

## 2025-01-22 DIAGNOSIS — I25.10 ATHEROSCLEROTIC HEART DISEASE OF NATIVE CORONARY ARTERY WITHOUT ANGINA PECTORIS: ICD-10-CM

## 2025-01-22 DIAGNOSIS — M79.642 PAIN IN RIGHT HAND: ICD-10-CM

## 2025-01-22 PROCEDURE — 99238 HOSP IP/OBS DSCHRG MGMT 30/<: CPT

## 2025-01-22 PROCEDURE — C1874: CPT

## 2025-01-22 PROCEDURE — G0463: CPT

## 2025-01-22 PROCEDURE — C1894: CPT

## 2025-01-22 PROCEDURE — C1769: CPT

## 2025-01-22 PROCEDURE — 80048 BASIC METABOLIC PNL TOTAL CA: CPT

## 2025-01-22 PROCEDURE — 99214 OFFICE O/P EST MOD 30 MIN: CPT

## 2025-01-22 PROCEDURE — C1887: CPT

## 2025-01-22 PROCEDURE — C1725: CPT

## 2025-01-22 PROCEDURE — 93005 ELECTROCARDIOGRAM TRACING: CPT

## 2025-01-22 PROCEDURE — 36415 COLL VENOUS BLD VENIPUNCTURE: CPT

## 2025-01-22 PROCEDURE — 85027 COMPLETE CBC AUTOMATED: CPT

## 2025-01-22 RX ORDER — ACETAMINOPHEN ER 650 MG TABLET,EXTENDED RELEASE 650 MG
650 TABLET, EXTENDED RELEASE ORAL
Qty: 20 | Refills: 0 | Status: ACTIVE | COMMUNITY
Start: 2025-01-22 | End: 2025-02-11

## 2025-01-22 RX ORDER — DICLOFENAC SODIUM 10 MG/G
1 GEL TOPICAL
Qty: 1 | Refills: 2 | Status: ACTIVE | COMMUNITY
Start: 2025-01-22 | End: 1900-01-01

## 2025-01-22 RX ADMIN — ASPIRIN 81 MILLIGRAM(S): 81 TABLET, COATED ORAL at 05:32

## 2025-01-22 RX ADMIN — Medication 75 MILLIGRAM(S): at 05:33

## 2025-01-22 RX ADMIN — Medication 10 MILLIGRAM(S): at 05:33

## 2025-01-22 RX ADMIN — Medication 25 MILLIGRAM(S): at 05:33

## 2025-01-22 NOTE — PROGRESS NOTE ADULT - PROBLEM SELECTOR PLAN 2
Continue taking your blood pressure medications as prescribed. Eat a heart healthy diet with low salt; exercise regularly (if cleared by your primary care doctor or cardiologist). Maintain a heart healthy weight and include healthy ways to manage stress. If you smoke, quit. If you need assistance to help you stop smoking, please use the following resource: [facility] for Tobacco Control – ([phone]). Follow up with your primary care doctor to continue having your blood pressure checked on a continual basis.

## 2025-01-22 NOTE — PROGRESS NOTE ADULT - PROBLEM SELECTOR PLAN 3
Goal is to keep LDL<70. Continue with your cholesterol medications as prescribed. Eat a heart healthy diet that is low in saturated fats and salt, and includes whole grains, fruits, vegetables and lean protein; exercise regularly (consult with your physician or cardiologist first); maintain a heart healthy weight; if you smoke - quit (A resource to help you stop smoking is the [facility] for Tobacco Control – phone number [phone].). Continue to follow with your primary physician or cardiologist.

## 2025-01-22 NOTE — PROGRESS NOTE ADULT - SUBJECTIVE AND OBJECTIVE BOX
Patient is a 79y old  Male who presents with a chief complaint of Abnormal cardiac CT (2025 15:30)          Allergies    shellfish (Hives (Mild))  acetaminophen containing compounds (Hives)    Intolerances        Medications:  amLODIPine   Tablet 10 milliGRAM(s) Oral daily  aspirin enteric coated 81 milliGRAM(s) Oral daily  atorvastatin 80 milliGRAM(s) Oral at bedtime  clopidogrel Tablet 75 milliGRAM(s) Oral daily  escitalopram 10 milliGRAM(s) Oral daily  hydrochlorothiazide 25 milliGRAM(s) Oral daily  metoprolol succinate ER 25 milliGRAM(s) Oral daily  sodium chloride 0.9%. 1000 milliLiter(s) IV Continuous <Continuous>  sodium chloride 0.9%. 1000 milliLiter(s) IV Continuous <Continuous>      Vitals:  T(C): 36.7 (25 @ 19:35), Max: 36.7 (25 @ 11:18)  HR: 57 (25 @ 20:30) (57 - 75)  BP: 146/66 (25 @ 20:30) (111/58 - 156/70)  BP(mean): 95 (25 @ 20:30) (95 - 105)  RR: 17 (25 @ 20:30) (15 - 18)  SpO2: 97% (25 @ 20:30) (94% - 100%)  Wt(kg): --  Daily Height in cm: 170.18 (2025 11:25)    Daily Weight in k.4 (2025 11:25)  I&O's Summary        Physical Exam:  Appearance: Normal  Eyes: PERRL, EOMI  HENT: Normal oral mus[place], NC/AT  Cardiovascular: S1S2, RRR, No M/R/G, no JVD, No Lower extremity edema  Procedural Access Site: No hematoma, Non-tender to palpation, 2+ pulse, No bruit, No Ecchymosis  Respiratory: Clear to auscultation bilaterally  Gastrointestinal: Soft, Non tender, Normal Bowel Sounds  Musculoskeletal: No clubbing, No joint deformity   Neurologic: Non-focal  Lymphatic: No lymphadenopathy  Psychiatry: AAOx3, Mood & affect appropriate  Skin: No rashes, No ecchymoses, No cyanosis        141  |  106  |  18  ----------------------------<  97  4.3   |  24  |  0.74    Ca    10.5      2025 12:17              Lipid panel   Hgb A1c                         15.0   10.78 )-----------( 328      ( 2025 12:17 )             45.0         ECG: SB 57 bpm

## 2025-01-22 NOTE — PROGRESS NOTE ADULT - PROBLEM SELECTOR PLAN 1
Monitor radial site for swelling, bleeding  Continue ASA, Plavix  Serial EKG  Telemetry monitoring  Outpatient cardiology follow up in 1 - 2 weeks

## 2025-01-22 NOTE — DISCHARGE NOTE NURSING/CASE MANAGEMENT/SOCIAL WORK - PATIENT PORTAL LINK FT
You can access the FollowMyHealth Patient Portal offered by [facility] by registering at the following website: [email or web address]. By joining [employer]’s FollowMyHealth portal, you will also be able to view your health information using other applications (apps) compatible with our system.

## 2025-01-22 NOTE — DISCHARGE NOTE NURSING/CASE MANAGEMENT/SOCIAL WORK - FINANCIAL ASSISTANCE
[facility] provides services at a reduced cost to those who are determined to be eligible through [facility]’s financial assistance program. Information regarding [facility]’s financial assistance program can be found by going to [email or web address] or by calling [phone].

## 2025-01-22 NOTE — SBIRT NOTE ADULT - NSSBIRTUNABLESCROTHER_GEN_A_CORE
ETOH consumption reported is within normal limits.  Consumes 2-3 x a week; 1-2 drinks, never 6 and no illicit substance use.

## 2025-01-23 ENCOUNTER — NON-APPOINTMENT (OUTPATIENT)
Age: 80
End: 2025-01-23

## 2025-01-27 DIAGNOSIS — M79.642 PAIN IN LEFT HAND: ICD-10-CM

## 2025-01-27 DIAGNOSIS — M79.641 PAIN IN RIGHT HAND: ICD-10-CM

## 2025-01-29 ENCOUNTER — NON-APPOINTMENT (OUTPATIENT)
Age: 80
End: 2025-01-29

## 2025-01-31 ENCOUNTER — NON-APPOINTMENT (OUTPATIENT)
Age: 80
End: 2025-01-31

## 2025-01-31 DIAGNOSIS — I48.0 PAROXYSMAL ATRIAL FIBRILLATION: ICD-10-CM

## 2025-01-31 RX ORDER — APIXABAN 5 MG/1
5 TABLET, FILM COATED ORAL
Qty: 180 | Refills: 1 | Status: ACTIVE | COMMUNITY
Start: 2025-01-31 | End: 1900-01-01

## 2025-02-10 ENCOUNTER — NON-APPOINTMENT (OUTPATIENT)
Age: 80
End: 2025-02-10

## 2025-02-10 ENCOUNTER — APPOINTMENT (OUTPATIENT)
Dept: ELECTROPHYSIOLOGY | Facility: CLINIC | Age: 80
End: 2025-02-10
Payer: MEDICARE

## 2025-02-10 PROCEDURE — 93298 REM INTERROG DEV EVAL SCRMS: CPT

## 2025-02-10 RX ORDER — RIVAROXABAN 20 MG/1
20 TABLET, FILM COATED ORAL
Qty: 90 | Refills: 3 | Status: ACTIVE | COMMUNITY
Start: 1900-01-01 | End: 1900-01-01

## 2025-02-26 ENCOUNTER — APPOINTMENT (OUTPATIENT)
Dept: CARDIOLOGY | Facility: CLINIC | Age: 80
End: 2025-02-26
Payer: MEDICARE

## 2025-02-26 ENCOUNTER — NON-APPOINTMENT (OUTPATIENT)
Age: 80
End: 2025-02-26

## 2025-02-26 VITALS
DIASTOLIC BLOOD PRESSURE: 73 MMHG | BODY MASS INDEX: 28.94 KG/M2 | HEART RATE: 59 BPM | SYSTOLIC BLOOD PRESSURE: 128 MMHG | OXYGEN SATURATION: 98 % | WEIGHT: 182 LBS

## 2025-02-26 DIAGNOSIS — I10 ESSENTIAL (PRIMARY) HYPERTENSION: ICD-10-CM

## 2025-02-26 DIAGNOSIS — I48.0 PAROXYSMAL ATRIAL FIBRILLATION: ICD-10-CM

## 2025-02-26 PROCEDURE — 99214 OFFICE O/P EST MOD 30 MIN: CPT

## 2025-02-26 PROCEDURE — G2211 COMPLEX E/M VISIT ADD ON: CPT

## 2025-03-10 ENCOUNTER — APPOINTMENT (OUTPATIENT)
Dept: ELECTROPHYSIOLOGY | Facility: CLINIC | Age: 80
End: 2025-03-10
Payer: MEDICARE

## 2025-03-10 ENCOUNTER — NON-APPOINTMENT (OUTPATIENT)
Age: 80
End: 2025-03-10

## 2025-03-10 VITALS
SYSTOLIC BLOOD PRESSURE: 127 MMHG | HEART RATE: 56 BPM | DIASTOLIC BLOOD PRESSURE: 71 MMHG | OXYGEN SATURATION: 99 % | WEIGHT: 171 LBS | BODY MASS INDEX: 27.19 KG/M2

## 2025-03-10 DIAGNOSIS — I47.10 SUPRAVENTRICULAR TACHYCARDIA, UNSPECIFIED: ICD-10-CM

## 2025-03-10 DIAGNOSIS — I48.0 PAROXYSMAL ATRIAL FIBRILLATION: ICD-10-CM

## 2025-03-10 PROCEDURE — 93280 PM DEVICE PROGR EVAL DUAL: CPT

## 2025-03-10 PROCEDURE — 93000 ELECTROCARDIOGRAM COMPLETE: CPT | Mod: 59

## 2025-03-10 PROCEDURE — 99214 OFFICE O/P EST MOD 30 MIN: CPT

## 2025-04-03 NOTE — H&P PST ADULT - ATTENDING PHYSICIAN (PRINT):______________________________ DATE:_______ TIME:_______
Please write the following letter on the letterhead:  To whom it may concern,  Mr. Schmitz has been my patient since October 2019.  Mr. Hand has the following medical history:  - History of cardial infarction/heart attack.  - Coronary artery disease.  - Bilateral sensorineural hearing loss.  - Prediabetes.  - Nonalcoholic steatohepatitis.  -Gastroesophageal reflux disease  - Calculus of the gallbladder without cholecystitis without obstructions.  - Retinal migraine.  - History of ischemic stroke.  - Obstructive sleep apnea.  - Bilateral elbow epicondylitis.  - Chronic right shoulder pain.  Please help him with his request.  Do not hesitate to call question.  Sincerely,  Dr. Cinthya Wilkins   Statement Selected

## 2025-04-14 ENCOUNTER — APPOINTMENT (OUTPATIENT)
Dept: ELECTROPHYSIOLOGY | Facility: CLINIC | Age: 80
End: 2025-04-14
Payer: MEDICARE

## 2025-04-14 ENCOUNTER — NON-APPOINTMENT (OUTPATIENT)
Age: 80
End: 2025-04-14

## 2025-04-14 PROCEDURE — 93298 REM INTERROG DEV EVAL SCRMS: CPT

## 2025-05-12 ENCOUNTER — NON-APPOINTMENT (OUTPATIENT)
Age: 80
End: 2025-05-12

## 2025-05-12 DIAGNOSIS — Z79.02 LONG TERM (CURRENT) USE OF ANTITHROMBOTICS/ANTIPLATELETS: ICD-10-CM

## 2025-05-12 RX ORDER — FAMOTIDINE 20 MG/1
20 TABLET, FILM COATED ORAL DAILY
Qty: 60 | Refills: 0 | Status: ACTIVE | COMMUNITY
Start: 2025-05-12 | End: 2025-07-11

## 2025-05-19 ENCOUNTER — APPOINTMENT (OUTPATIENT)
Dept: ELECTROPHYSIOLOGY | Facility: CLINIC | Age: 80
End: 2025-05-19
Payer: MEDICARE

## 2025-05-19 ENCOUNTER — NON-APPOINTMENT (OUTPATIENT)
Age: 80
End: 2025-05-19

## 2025-05-19 PROCEDURE — 93298 REM INTERROG DEV EVAL SCRMS: CPT

## 2025-05-21 ENCOUNTER — APPOINTMENT (OUTPATIENT)
Dept: DERMATOLOGY | Facility: CLINIC | Age: 80
End: 2025-05-21
Payer: MEDICARE

## 2025-05-21 DIAGNOSIS — L02.212 CUTANEOUS ABSCESS OF BACK [ANY PART, EXCEPT BUTTOCK]: ICD-10-CM

## 2025-05-21 DIAGNOSIS — D48.9 NEOPLASM OF UNCERTAIN BEHAVIOR, UNSPECIFIED: ICD-10-CM

## 2025-05-21 PROCEDURE — 99204 OFFICE O/P NEW MOD 45 MIN: CPT | Mod: 25

## 2025-05-21 PROCEDURE — 11102 TANGNTL BX SKIN SINGLE LES: CPT | Mod: 59

## 2025-05-21 PROCEDURE — 10060 I&D ABSCESS SIMPLE/SINGLE: CPT | Mod: 59

## 2025-05-21 RX ORDER — DOXYCYCLINE HYCLATE 100 MG/1
100 CAPSULE ORAL
Qty: 28 | Refills: 0 | Status: ACTIVE | COMMUNITY
Start: 2025-05-21 | End: 1900-01-01

## 2025-05-27 LAB — DERMATOLOGY BIOPSY: NORMAL

## 2025-05-29 ENCOUNTER — APPOINTMENT (OUTPATIENT)
Dept: VASCULAR SURGERY | Facility: CLINIC | Age: 80
End: 2025-05-29
Payer: MEDICARE

## 2025-05-29 ENCOUNTER — APPOINTMENT (OUTPATIENT)
Dept: CARDIOLOGY | Facility: CLINIC | Age: 80
End: 2025-05-29
Payer: MEDICARE

## 2025-05-29 VITALS
DIASTOLIC BLOOD PRESSURE: 68 MMHG | WEIGHT: 172 LBS | SYSTOLIC BLOOD PRESSURE: 121 MMHG | BODY MASS INDEX: 26.94 KG/M2 | HEART RATE: 58 BPM | OXYGEN SATURATION: 99 %

## 2025-05-29 VITALS
SYSTOLIC BLOOD PRESSURE: 104 MMHG | WEIGHT: 172 LBS | HEIGHT: 67 IN | DIASTOLIC BLOOD PRESSURE: 64 MMHG | HEART RATE: 48 BPM | BODY MASS INDEX: 27 KG/M2

## 2025-05-29 DIAGNOSIS — I65.23 OCCLUSION AND STENOSIS OF BILATERAL CAROTID ARTERIES: ICD-10-CM

## 2025-05-29 DIAGNOSIS — I71.40 ABDOMINAL AORTIC ANEURYSM, WITHOUT RUPTURE, UNSPECIFIED: ICD-10-CM

## 2025-05-29 DIAGNOSIS — I48.0 PAROXYSMAL ATRIAL FIBRILLATION: ICD-10-CM

## 2025-05-29 PROCEDURE — 99212 OFFICE O/P EST SF 10 MIN: CPT

## 2025-05-29 PROCEDURE — 93880 EXTRACRANIAL BILAT STUDY: CPT

## 2025-05-29 PROCEDURE — 99214 OFFICE O/P EST MOD 30 MIN: CPT

## 2025-05-29 PROCEDURE — G2211 COMPLEX E/M VISIT ADD ON: CPT

## 2025-06-13 ENCOUNTER — NON-APPOINTMENT (OUTPATIENT)
Age: 80
End: 2025-06-13

## 2025-06-19 ENCOUNTER — NON-APPOINTMENT (OUTPATIENT)
Age: 80
End: 2025-06-19

## 2025-06-19 ENCOUNTER — APPOINTMENT (OUTPATIENT)
Dept: ELECTROPHYSIOLOGY | Facility: CLINIC | Age: 80
End: 2025-06-19

## 2025-06-19 PROCEDURE — 93298 REM INTERROG DEV EVAL SCRMS: CPT

## 2025-06-20 ENCOUNTER — APPOINTMENT (OUTPATIENT)
Dept: DERMATOLOGY | Facility: CLINIC | Age: 80
End: 2025-06-20

## 2025-06-24 ENCOUNTER — NON-APPOINTMENT (OUTPATIENT)
Age: 80
End: 2025-06-24

## 2025-06-25 ENCOUNTER — NON-APPOINTMENT (OUTPATIENT)
Age: 80
End: 2025-06-25

## 2025-06-27 ENCOUNTER — APPOINTMENT (OUTPATIENT)
Dept: DERMATOLOGY | Facility: CLINIC | Age: 80
End: 2025-06-27
Payer: MEDICARE

## 2025-06-27 PROCEDURE — 99213 OFFICE O/P EST LOW 20 MIN: CPT

## 2025-06-30 ENCOUNTER — NON-APPOINTMENT (OUTPATIENT)
Age: 80
End: 2025-06-30

## 2025-06-30 ENCOUNTER — APPOINTMENT (OUTPATIENT)
Dept: DERMATOLOGY | Facility: CLINIC | Age: 80
End: 2025-06-30
Payer: MEDICARE

## 2025-06-30 PROCEDURE — 13121 CMPLX RPR S/A/L 2.6-7.5 CM: CPT

## 2025-06-30 PROCEDURE — 17311 MOHS 1 STAGE H/N/HF/G: CPT

## 2025-07-01 ENCOUNTER — NON-APPOINTMENT (OUTPATIENT)
Age: 80
End: 2025-07-01

## 2025-07-01 ENCOUNTER — APPOINTMENT (OUTPATIENT)
Dept: DERMATOLOGY | Facility: CLINIC | Age: 80
End: 2025-07-01
Payer: MEDICARE

## 2025-07-01 PROCEDURE — 99024 POSTOP FOLLOW-UP VISIT: CPT

## 2025-07-03 RX ORDER — MUPIROCIN 20 MG/G
2 OINTMENT TOPICAL
Qty: 2 | Refills: 2 | Status: ACTIVE | COMMUNITY
Start: 2025-07-03 | End: 1900-01-01

## 2025-07-07 ENCOUNTER — APPOINTMENT (OUTPATIENT)
Dept: DERMATOLOGY | Facility: CLINIC | Age: 80
End: 2025-07-07
Payer: MEDICARE

## 2025-07-07 PROBLEM — D04.4 SQUAMOUS CELL CARCINOMA IN SITU (SCCIS) OF SCALP: Status: ACTIVE | Noted: 2025-06-29

## 2025-07-07 PROCEDURE — 99024 POSTOP FOLLOW-UP VISIT: CPT

## 2025-07-14 ENCOUNTER — APPOINTMENT (OUTPATIENT)
Dept: DERMATOLOGY | Facility: CLINIC | Age: 80
End: 2025-07-14

## 2025-07-16 ENCOUNTER — APPOINTMENT (OUTPATIENT)
Dept: OTOLARYNGOLOGY | Facility: CLINIC | Age: 80
End: 2025-07-16
Payer: MEDICARE

## 2025-07-16 VITALS
SYSTOLIC BLOOD PRESSURE: 107 MMHG | OXYGEN SATURATION: 98 % | WEIGHT: 170 LBS | BODY MASS INDEX: 26.68 KG/M2 | HEART RATE: 56 BPM | DIASTOLIC BLOOD PRESSURE: 63 MMHG | HEIGHT: 67 IN

## 2025-07-16 PROCEDURE — 99214 OFFICE O/P EST MOD 30 MIN: CPT

## 2025-07-17 RX ORDER — OMEPRAZOLE 20 MG/1
20 CAPSULE, DELAYED RELEASE ORAL
Qty: 1 | Refills: 3 | Status: COMPLETED | COMMUNITY
Start: 2025-07-16 | End: 2025-07-16

## 2025-07-22 ENCOUNTER — APPOINTMENT (OUTPATIENT)
Dept: ELECTROPHYSIOLOGY | Facility: CLINIC | Age: 80
End: 2025-07-22
Payer: MEDICARE

## 2025-07-22 ENCOUNTER — NON-APPOINTMENT (OUTPATIENT)
Age: 80
End: 2025-07-22

## 2025-07-22 PROCEDURE — 93298 REM INTERROG DEV EVAL SCRMS: CPT

## 2025-08-26 ENCOUNTER — NON-APPOINTMENT (OUTPATIENT)
Age: 80
End: 2025-08-26

## 2025-08-26 ENCOUNTER — APPOINTMENT (OUTPATIENT)
Dept: ELECTROPHYSIOLOGY | Facility: CLINIC | Age: 80
End: 2025-08-26
Payer: MEDICARE

## 2025-08-26 PROCEDURE — 93298 REM INTERROG DEV EVAL SCRMS: CPT

## (undated) DEVICE — SOL IRR POUR H2O 500ML

## (undated) DEVICE — VAGINAL PACKING 2"

## (undated) DEVICE — SYR LUER LOK 5CC

## (undated) DEVICE — DRAPE MAGNETIC INSTRUMENT MEDIUM

## (undated) DEVICE — DRAPE 3/4 SHEET 52X76"

## (undated) DEVICE — SUT SILK 3-0 18" TIES

## (undated) DEVICE — SUT SILK 2-0 18" TIES

## (undated) DEVICE — DRSG TELFA 3 X 8

## (undated) DEVICE — SUT CHROMIC 3-0 27" RB-1

## (undated) DEVICE — SUT VICRYL 3-0 27" SH UNDYED

## (undated) DEVICE — DRAPE INSTRUMENT POUCH 6.75" X 11"

## (undated) DEVICE — WARMING BLANKET LOWER ADULT

## (undated) DEVICE — PROTECTOR HEEL / ELBOW FLUFFY

## (undated) DEVICE — PACK HEAD & NECK

## (undated) DEVICE — DRAPE SPLIT SHEET 77" X 120"

## (undated) DEVICE — NDL HYPO SAFE 22G X 1" (BLACK)

## (undated) DEVICE — DRAPE TOWEL BLUE 17" X 24"

## (undated) DEVICE — GLV 7 PROTEXIS (WHITE)

## (undated) DEVICE — ELCTR BOVIE PENCIL SMOKE EVACUATION

## (undated) DEVICE — ELCTR BOVIE TIP BLADE INSULATED 2.8" EDGE WITH SAFETY

## (undated) DEVICE — VENODYNE/SCD SLEEVE CALF MEDIUM

## (undated) DEVICE — STAPLER SKIN VISI-STAT 35 WIDE

## (undated) DEVICE — CANISTER DISPOSABLE THIN WALL 3000CC

## (undated) DEVICE — LABELS BLANK W PEN

## (undated) DEVICE — BIPOLAR FORCEP KIRWAN JEWELERS STR 4" X 0.4MM W 12FT CORD (GREEN)